# Patient Record
Sex: FEMALE | Race: BLACK OR AFRICAN AMERICAN | Employment: OTHER | URBAN - METROPOLITAN AREA
[De-identification: names, ages, dates, MRNs, and addresses within clinical notes are randomized per-mention and may not be internally consistent; named-entity substitution may affect disease eponyms.]

---

## 2017-06-20 ENCOUNTER — OFFICE VISIT (OUTPATIENT)
Dept: INTERNAL MEDICINE CLINIC | Age: 72
End: 2017-06-20

## 2017-06-20 VITALS
TEMPERATURE: 97.7 F | DIASTOLIC BLOOD PRESSURE: 69 MMHG | BODY MASS INDEX: 26.68 KG/M2 | HEIGHT: 66 IN | WEIGHT: 166 LBS | RESPIRATION RATE: 15 BRPM | SYSTOLIC BLOOD PRESSURE: 157 MMHG | HEART RATE: 64 BPM | OXYGEN SATURATION: 98 %

## 2017-06-20 DIAGNOSIS — E11.42 DIABETIC POLYNEUROPATHY ASSOCIATED WITH TYPE 2 DIABETES MELLITUS (HCC): ICD-10-CM

## 2017-06-20 DIAGNOSIS — E11.59 CONTROLLED TYPE 2 DIABETES MELLITUS WITH OTHER CIRCULATORY COMPLICATION: ICD-10-CM

## 2017-06-20 DIAGNOSIS — S78.112A UNILATERAL AKA, LEFT (HCC): ICD-10-CM

## 2017-06-20 DIAGNOSIS — I10 ESSENTIAL HYPERTENSION: Primary | ICD-10-CM

## 2017-06-20 DIAGNOSIS — G62.9 NEUROPATHY: ICD-10-CM

## 2017-06-20 PROBLEM — S78.119A UNILATERAL AKA (HCC): Status: ACTIVE | Noted: 2017-06-20

## 2017-06-20 RX ORDER — INSULIN GLARGINE 100 [IU]/ML
26 INJECTION, SOLUTION SUBCUTANEOUS DAILY
Qty: 5 PEN | Refills: 0 | Status: SHIPPED | OUTPATIENT
Start: 2017-06-20 | End: 2017-10-25 | Stop reason: SDUPTHER

## 2017-06-20 RX ORDER — HYDRALAZINE HYDROCHLORIDE 10 MG/1
10 TABLET, FILM COATED ORAL 3 TIMES DAILY
COMMUNITY
End: 2017-06-20 | Stop reason: SDUPTHER

## 2017-06-20 RX ORDER — INSULIN GLARGINE 100 [IU]/ML
26 INJECTION, SOLUTION SUBCUTANEOUS DAILY
Qty: 9 PEN | Refills: 3 | Status: SHIPPED | OUTPATIENT
Start: 2017-06-20 | End: 2017-06-20 | Stop reason: SDUPTHER

## 2017-06-20 RX ORDER — AMLODIPINE BESYLATE 5 MG/1
5 TABLET ORAL DAILY
COMMUNITY
End: 2017-06-20 | Stop reason: SDUPTHER

## 2017-06-20 RX ORDER — FUROSEMIDE 40 MG/1
40 TABLET ORAL DAILY
Qty: 90 TAB | Refills: 3 | Status: SHIPPED | OUTPATIENT
Start: 2017-06-20 | End: 2018-05-09 | Stop reason: SDUPTHER

## 2017-06-20 RX ORDER — HYDRALAZINE HYDROCHLORIDE 10 MG/1
10 TABLET, FILM COATED ORAL 3 TIMES DAILY
Qty: 270 TAB | Refills: 3 | Status: SHIPPED | OUTPATIENT
Start: 2017-06-20 | End: 2018-05-09 | Stop reason: SDUPTHER

## 2017-06-20 RX ORDER — INSULIN GLARGINE 100 [IU]/ML
26 INJECTION, SOLUTION SUBCUTANEOUS
COMMUNITY
End: 2017-06-20 | Stop reason: SDUPTHER

## 2017-06-20 RX ORDER — GABAPENTIN 100 MG/1
200 CAPSULE ORAL 2 TIMES DAILY
Qty: 360 CAP | Refills: 3 | Status: SHIPPED | OUTPATIENT
Start: 2017-06-20 | End: 2018-05-09 | Stop reason: SDUPTHER

## 2017-06-20 RX ORDER — METOPROLOL TARTRATE 50 MG/1
50 TABLET ORAL 2 TIMES DAILY
Qty: 180 TAB | Refills: 3 | Status: SHIPPED | OUTPATIENT
Start: 2017-06-20 | End: 2017-06-26

## 2017-06-20 RX ORDER — CLONIDINE HYDROCHLORIDE 0.2 MG/1
0.2 TABLET ORAL 2 TIMES DAILY
Qty: 180 TAB | Refills: 3 | Status: SHIPPED | OUTPATIENT
Start: 2017-06-20 | End: 2017-06-21 | Stop reason: DRUGHIGH

## 2017-06-20 RX ORDER — METOPROLOL TARTRATE 50 MG/1
TABLET ORAL 2 TIMES DAILY
COMMUNITY
End: 2017-06-20 | Stop reason: SDUPTHER

## 2017-06-20 RX ORDER — GABAPENTIN 100 MG/1
200 CAPSULE ORAL 2 TIMES DAILY
COMMUNITY
End: 2017-06-20 | Stop reason: SDUPTHER

## 2017-06-20 RX ORDER — AMLODIPINE BESYLATE 5 MG/1
5 TABLET ORAL DAILY
Qty: 90 TAB | Refills: 3 | Status: SHIPPED | OUTPATIENT
Start: 2017-06-20 | End: 2018-05-09 | Stop reason: SDUPTHER

## 2017-06-20 RX ORDER — FUROSEMIDE 40 MG/1
TABLET ORAL DAILY
COMMUNITY
End: 2017-06-20 | Stop reason: SDUPTHER

## 2017-06-20 RX ORDER — CLONIDINE HYDROCHLORIDE 0.1 MG/1
TABLET ORAL 2 TIMES DAILY
COMMUNITY
End: 2017-06-20 | Stop reason: SDUPTHER

## 2017-06-20 NOTE — PATIENT INSTRUCTIONS

## 2017-06-20 NOTE — PROGRESS NOTES
FLOWER Cortés is a 66 y.o. female, she presents today for:    66year old new patient. With hypertension and diabetes. Lives alone. Supported by son Kj Romeo who accompanied her to the visit. History of daibetic ulcer on left foot, including wound care. Developed into gangrene and amputated toes, has had 3 surgeries. 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 Dr. Morteza Smith February. Currently has temporary prosthesis. Continues to be in rehab. Had some treatment on kidney (Teresia Foxworth) while in the hosptal for surgery. Seen at [facility] for rehab and medications for HTN adjusted there (clonidine and amitrypitline)    No known history of heart attack or stroke. Diabetes: taking insulin in the morning 26 units. Lowest blood sugar 64. Highest in past 2 weeks 190 units. PMH/PSH: reviewed and updated  Sochx:  reports that she has never smoked. She does not have any smokeless tobacco history on file. She reports that she does not drink alcohol. Famhx: reviewed and updated     All: No Known Allergies  Med:   Current Outpatient Prescriptions   Medication Sig    amLODIPine (NORVASC) 5 mg tablet Take 5 mg by mouth daily.  gabapentin (NEURONTIN) 100 mg capsule Take 200 mg by mouth two (2) times a day.  hydrALAZINE (APRESOLINE) 10 mg tablet Take 10 mg by mouth three (3) times daily.  metoprolol tartrate (LOPRESSOR) 50 mg tablet Take  by mouth two (2) times a day.  furosemide (LASIX) 40 mg tablet Take  by mouth daily.  cloNIDine HCl (CATAPRES) 0.1 mg tablet Take  by mouth two (2) times a day.  insulin glargine (LANTUS SOLOSTAR) 100 unit/mL (3 mL) inpn 26 Units by SubCUTAneous route. No current facility-administered medications for this visit. Review of Systems   Constitutional: Negative for chills, fever and weight loss. HENT: Negative for congestion. Respiratory: Negative for cough, shortness of breath and wheezing. Cardiovascular: Negative for chest pain and leg swelling.    Gastrointestinal: Negative for abdominal pain, diarrhea, nausea and vomiting. Genitourinary: Negative for dysuria. Skin: Negative for rash. Neurological: Negative for dizziness and headaches. PE:  Blood pressure 157/69, pulse 64, temperature 97.7 °F (36.5 °C), temperature source Oral, resp. rate 15, height 5' 5.5\" (1.664 m), weight 166 lb (75.3 kg), SpO2 98 %. Body mass index is 27.2 kg/(m^2). Physical Exam   Constitutional: She is oriented to person, place, and time. She appears well-developed and well-nourished. No distress. HENT:   Head: Normocephalic. Mouth/Throat: Oropharynx is clear and moist.   Eyes: Conjunctivae and EOM are normal.   Neck: Neck supple. Cardiovascular: Normal rate, regular rhythm and normal heart sounds. Pulmonary/Chest: Effort normal and breath sounds normal.   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 Skin: Skin is warm and dry. Nursing note and vitals reviewed. Labs:   No results found for any visits on 06/20/17. A/P:  66 y.o. female    ICD-10-CM ICD-9-CM    1. Essential hypertension I10 401.9 amLODIPine (NORVASC) 5 mg tablet      hydrALAZINE (APRESOLINE) 10 mg tablet      metoprolol tartrate (LOPRESSOR) 50 mg tablet      furosemide (LASIX) 40 mg tablet      cloNIDine HCl (CATAPRES) 0.2 mg tablet      METABOLIC PANEL, COMPREHENSIVE   2. Controlled type 2 diabetes mellitus with other circulatory complication ([facility]) D08.22 250.70 insulin glargine (LANTUS SOLOSTAR) 100 unit/mL (3 mL) inpn      MICROALBUMIN, UR, RAND W/ MICROALBUMIN/CREA RATIO      HEMOGLOBIN A1C WITH EAG      CBC W/O DIFF      DISCONTINUED: insulin glargine (LANTUS SOLOSTAR) 100 unit/mL (3 mL) inpn   3. Neuropathy G62.9 355.9 gabapentin (NEURONTIN) 100 mg capsule   4. Diabetic polyneuropathy associated with type 2 diabetes mellitus ([facility]) P05.75 575.12 METABOLIC PANEL, COMPREHENSIVE     357.2    5.  Unilateral AKA, left [id number] V49.76      Uncontrolled HTN: continue current medications, pending lab results. - increase clonidine from 0.1 to 0.2 mg    Diabetes, on insulin, with neuropathy: reported as controlled per patient. contintue at 26 units of insulin daily. Not on metformin, suspect CKD (though patient denies this). - A1C and microalbumin requested. - advised patient okay to reduce gabapentin from 200 to 100 if she would like to see if pain is controlled off this medication. Unilateral AKA: healing well per patient. - She was given AVS and expressed understanding with the diagnosis and plan as discussed. Follow-up Disposition:  Return in about 1 month (around 7/20/2017) for medicare wellness and follow-up BP. No future appointments.

## 2017-06-20 NOTE — MR AVS SNAPSHOT
Visit Information Date & Time Provider Department Dept. Phone Encounter #  
 6/20/2017 10:00 AM Gabi Ramos MD [facility] [phone] Follow-up Instructions Return in about 1 month (around 7/20/2017) for medicare wellness and follow-up BP. Upcoming Health Maintenance Date Due DTaP/Tdap/Td series (1 - Tdap) 7/18/1964 FOBT Q 1 YEAR AGE 50-75 7/18/1993 ZOSTER VACCINE AGE 60> 7/18/2003 GLAUCOMA SCREENING Q2Y 7/18/2008 OSTEOPOROSIS SCREENING (DEXA) 7/18/2008 Pneumococcal 65+ Low/Medium Risk (1 of 2 - PCV13) 7/18/2008 MEDICARE YEARLY EXAM 7/18/2008 BREAST CANCER SCRN MAMMOGRAM 2/14/2014 INFLUENZA AGE 9 TO ADULT 8/1/2017 Allergies as of 6/20/2017  Review Complete On: 6/20/2017 By: Lannette Soulier No Known Allergies Current Immunizations  Never Reviewed No immunizations on file. Not reviewed this visit You Were Diagnosed With   
  
 Codes Comments Essential hypertension    -  Primary ICD-10-CM: I10 
ICD-9-CM: 401.9 Controlled type 2 diabetes mellitus with other circulatory complication (HCC)     CCB-96-AR: E11.59 
ICD-9-CM: 250.70 Neuropathy     ICD-10-CM: G62.9 ICD-9-CM: 355.9 Diabetic polyneuropathy associated with type 2 diabetes mellitus ([facility]     ICD-10-CM: E11.42 
ICD-9-CM: 250.60, 357.2 Unilateral AKA, left     ICD-10-CM: I72.887 ICD-9-CM: V49.76 Vitals BP Pulse Temp Resp Height(growth percentile) Weight(growth percentile) 157/69 64 97.7 °F (36.5 °C) (Oral) 15 5' 5.5\" (1.664 m) 166 lb (75.3 kg) LMP SpO2 BMI Smoking Status (LMP Unknown) 98% 27.2 kg/m2 Never Smoker Vitals History BMI and BSA Data Body Mass Index Body Surface Area  
 27.2 kg/m 2 1.87 m 2 Preferred Pharmacy Pharmacy Name Phone [address], [facility] [address] [phone] Your Updated Medication List  
  
   
This list is accurate as of: 6/20/17 11:17 AM.  Always use your most recent med list. amLODIPine 5 mg tablet Commonly known as:  Sameul Tru Take 1 Tab by mouth daily. cloNIDine HCl 0.2 mg tablet Commonly known as:  CATAPRES Take 1 Tab by mouth two (2) times a day. furosemide 40 mg tablet Commonly known as:  LASIX Take 1 Tab by mouth daily. gabapentin 100 mg capsule Commonly known as:  NEURONTIN Take 2 Caps by mouth two (2) times a day. hydrALAZINE 10 mg tablet Commonly known as:  APRESOLINE Take 1 Tab by mouth three (3) times daily. insulin glargine 100 unit/mL (3 mL) Inpn Commonly known as:  LANTUS SOLOSTAR  
26 Units by SubCUTAneous route daily. metoprolol tartrate 50 mg tablet Commonly known as:  LOPRESSOR Take 1 Tab by mouth two (2) times a day. Prescriptions Sent to Pharmacy Refills  
 amLODIPine (NORVASC) 5 mg tablet 3 Sig: Take 1 Tab by mouth daily. Class: Normal  
 Pharmacy: [address] [place] [address] Ph #: [phone] Route: Oral  
 hydrALAZINE (APRESOLINE) 10 mg tablet 3 Sig: Take 1 Tab by mouth three (3) times daily. Class: Normal  
 Pharmacy: [address] [place] [address] Ph #: [phone] Route: Oral  
 metoprolol tartrate (LOPRESSOR) 50 mg tablet 3 Sig: Take 1 Tab by mouth two (2) times a day. Class: Normal  
 Pharmacy: [address] [place] [address] Ph #: [phone] Route: Oral  
 furosemide (LASIX) 40 mg tablet 3 Sig: Take 1 Tab by mouth daily. Class: Normal  
 Pharmacy: [address] [place] [address] Ph #: [phone] Route: Oral  
 cloNIDine HCl (CATAPRES) 0.2 mg tablet 3 Sig: Take 1 Tab by mouth two (2) times a day.   
 Class: Normal  
 Pharmacy: [address] [place] [address], [address] [place] [address] Ph #: [phone] Route: Oral  
 gabapentin (NEURONTIN) 100 mg capsule 3 Sig: Take 2 Caps by mouth two (2) times a day. Class: Normal  
 Pharmacy: [address] [place] [address], [address] [place] [address] Ph #: [phone] Route: Oral  
 insulin glargine (LANTUS SOLOSTAR) 100 unit/mL (3 mL) inpn 0 Si Units by SubCUTAneous route daily. Class: Normal  
 Pharmacy: [facility] [address] [place] [address], [facility] [address] [place] [address] Ph #: [phone] Route: SubCUTAneous We Performed the Following CBC W/O DIFF [18410 CPT(R)] HEMOGLOBIN A1C WITH EAG [32335 CPT(R)] METABOLIC PANEL, COMPREHENSIVE [04336 CPT(R)] MICROALBUMIN, UR, RAND W/ MICROALBUMIN/CREA RATIO E325227 CPT(R)] Follow-up Instructions Return in about 1 month (around 2017) for medicare wellness and follow-up BP. Patient Instructions High Blood Pressure: Care Instructions Your Care Instructions If your blood pressure is usually above 140/90, you have high blood pressure, or hypertension. That means the top number is 140 or higher or the bottom number is 90 or higher, or both. Despite what a lot of people think, high blood pressure usually doesn't cause headaches or make you feel dizzy or lightheaded. It usually has no symptoms. But it does increase your risk for heart attack, stroke, and kidney or eye damage. The higher your blood pressure, the more your risk increases. Your doctor will give you a goal for your blood pressure. Your goal will be based on your health and your age. An example of a goal is to keep your blood pressure below 140/90. Lifestyle changes, such as eating healthy and being active, are always important to help lower blood pressure.  You might also take medicine to reach your blood pressure goal. 
 Follow-up care is a key part of your treatment and safety. Be sure to make and go to all appointments, and call your doctor if you are having problems. It's also a good idea to know your test results and keep a list of the medicines you take. How can you care for yourself at home? Medical treatment · If you stop taking your medicine, your blood pressure will go back up. You may take one or more types of medicine to lower your blood pressure. Be safe with medicines. Take your medicine exactly as prescribed. Call your doctor if you think you are having a problem with your medicine. · Talk to your doctor before you start taking aspirin every day. Aspirin can help certain people lower their risk of a heart attack or stroke. But taking aspirin isn't right for everyone, because it can cause serious bleeding. · See your doctor regularly. You may need to see the doctor more often at first or until your blood pressure comes down. · If you are taking blood pressure medicine, talk to your doctor before you take decongestants or anti-inflammatory medicine, such as ibuprofen. Some of these medicines can raise blood pressure. · Learn how to check your blood pressure at home. Lifestyle changes · Stay at a healthy weight. This is especially important if you put on weight around the waist. Losing even 10 pounds can help you lower your blood pressure. · If your doctor recommends it, get more exercise. Walking is a good choice. Bit by bit, increase the amount you walk every day. Try for at least 30 minutes on most days of the week. You also may want to swim, bike, or do other activities. · Avoid or limit alcohol. Talk to your doctor about whether you can drink any alcohol. · Try to limit how much sodium you eat to less than 2,300 milligrams (mg) a day. Your doctor may ask you to try to eat less than 1,500 mg a day.  
· Eat plenty of fruits (such as bananas and oranges), vegetables, legumes, whole grains, and low-fat dairy products. · Lower the amount of saturated fat in your diet. Saturated fat is found in animal products such as milk, cheese, and meat. Limiting these foods may help you lose weight and also lower your risk for heart disease. · Do not smoke. Smoking increases your risk for heart attack and stroke. If you need help quitting, talk to your doctor about stop-smoking programs and medicines. These can increase your chances of quitting for good. When should you call for help? Call 911 anytime you think you may need emergency care. This may mean having symptoms that suggest that your blood pressure is causing a serious heart or blood vessel problem. Your blood pressure may be over 180/110. For example, call 911 if: 
· You have symptoms of a heart attack. These may include: ¨ Chest pain or pressure, or a strange feeling in the chest. 
¨ Sweating. ¨ Shortness of breath. ¨ Nausea or vomiting. ¨ Pain, pressure, or a strange feeling in the back, neck, jaw, or upper belly or in one or both shoulders or arms. ¨ Lightheadedness or sudden weakness. ¨ A fast or irregular heartbeat. · You have symptoms of a stroke. These may include: 
¨ Sudden numbness, tingling, weakness, or loss of movement in your face, arm, or leg, especially on only one side of your body. ¨ Sudden vision changes. ¨ Sudden trouble speaking. ¨ Sudden confusion or trouble understanding simple statements. ¨ Sudden problems with walking or balance. ¨ A sudden, severe headache that is different from past headaches. · You have severe back or belly pain. Do not wait until your blood pressure comes down on its own. Get help right away. Call your doctor now or seek immediate care if: 
· Your blood pressure is much higher than normal (such as 180/110 or higher), but you don't have symptoms. · You think high blood pressure is causing symptoms, such as: ¨ Severe headache. ¨ Blurry vision. Watch closely for changes in your health, and be sure to contact your doctor if: 
· Your blood pressure measures 140/90 or higher at least 2 times. That means the top number is 140 or higher or the bottom number is 90 or higher, or both. · You think you may be having side effects from your blood pressure medicine. · Your blood pressure is usually normal, but it goes above normal at least 2 times. Where can you learn more? Go to http://justice-eladio.info/. Enter E254 in the search box to learn more about \"High Blood Pressure: Care Instructions. \" Current as of: August 8, 2016 Content Version: 11.3 © 5309-6381 [employer]. Care instructions adapted under license by [employer] (which disclaims liability or warranty for this information). If you have questions about a medical condition or this instruction, always ask your healthcare professional. [place] [address] any warranty or liability for your use of this information. Introducing [address] Lito Amaro introduces Blue Calypso patient portal. Now you can access parts of your medical record, email your doctor's office, and request medication refills online. 1. In your internet browser, go to [email or web address] 2. Click on the First Time User? Click Here link in the Sign In box. You will see the New Member Sign Up page. 3. Enter your Blue Calypso Access Code exactly as it appears below. You will not need to use this code after youve completed the sign-up process. If you do not sign up before the expiration date, you must request a new code. · Blue Calypso Access Code: [email or web address] Expires: 9/18/2017  9:51 AM 
 
4. Enter the last four digits of your Social Security Number (xxxx) and Date of Birth (mm/dd/yyyy) as indicated and click Submit. You will be taken to the next sign-up page. 5. Create a Blue Calypso ID.  This will be your Blue Calypso login ID and cannot be changed, so think of one that is secure and easy to remember. 6. Create a aScentias password. You can change your password at any time. 7. Enter your Password Reset Question and Answer. This can be used at a later time if you forget your password. 8. Enter your e-mail address. You will receive e-mail notification when new information is available in [address] 9. Click Sign Up. You can now view and download portions of your medical record. 10. Click the Download Summary menu link to download a portable copy of your medical information. If you have questions, please visit the Frequently Asked Questions section of the aScentias website. Remember, aScentias is NOT to be used for urgent needs. For medical emergencies, dial 911. Now available from your iPhone and Android! Please provide this summary of care documentation to your next provider. Your primary care clinician is listed as Ronda Barajas. If you have any questions after today's visit, please call [phone].

## 2017-06-21 ENCOUNTER — TELEPHONE (OUTPATIENT)
Dept: INTERNAL MEDICINE CLINIC | Age: 72
End: 2017-06-21

## 2017-06-21 DIAGNOSIS — I10 ESSENTIAL HYPERTENSION: ICD-10-CM

## 2017-06-21 DIAGNOSIS — N18.3 CKD (CHRONIC KIDNEY DISEASE), STAGE 3 (MODERATE): ICD-10-CM

## 2017-06-21 DIAGNOSIS — E11.21 DIABETIC NEPHROPATHY ASSOCIATED WITH TYPE 2 DIABETES MELLITUS (HCC): Primary | ICD-10-CM

## 2017-06-21 PROBLEM — N18.9 CKD (CHRONIC KIDNEY DISEASE): Status: ACTIVE | Noted: 2017-06-21

## 2017-06-21 LAB
ALBUMIN SERPL-MCNC: 3.5 G/DL (ref 3.5–4.8)
ALBUMIN/CREAT UR: 1258.9 MG/G CREAT (ref 0–30)
ALBUMIN/GLOB SERPL: 1 {RATIO} (ref 1.2–2.2)
ALP SERPL-CCNC: 89 IU/L (ref 39–117)
ALT SERPL-CCNC: 7 IU/L (ref 0–32)
AST SERPL-CCNC: 11 IU/L (ref 0–40)
BILIRUB SERPL-MCNC: 0.3 MG/DL (ref 0–1.2)
BUN SERPL-MCNC: 37 MG/DL (ref 8–27)
BUN/CREAT SERPL: 21 (ref 12–28)
CALCIUM SERPL-MCNC: 9 MG/DL (ref 8.7–10.3)
CHLORIDE SERPL-SCNC: 104 MMOL/L (ref 96–106)
CO2 SERPL-SCNC: 21 MMOL/L (ref 18–29)
CREAT SERPL-MCNC: 1.76 MG/DL (ref 0.57–1)
CREAT UR-MCNC: 17.5 MG/DL
ERYTHROCYTE [DISTWIDTH] IN BLOOD BY AUTOMATED COUNT: 14.4 % (ref 12.3–15.4)
EST. AVERAGE GLUCOSE BLD GHB EST-MCNC: 174 MG/DL
GLOBULIN SER CALC-MCNC: 3.4 G/DL (ref 1.5–4.5)
GLUCOSE SERPL-MCNC: 91 MG/DL (ref 65–99)
HBA1C MFR BLD: 7.7 % (ref 4.8–5.6)
HCT VFR BLD AUTO: 36.8 % (ref 34–46.6)
HGB BLD-MCNC: 11.7 G/DL (ref 11.1–15.9)
MCH RBC QN AUTO: 25.4 PG (ref 26.6–33)
MCHC RBC AUTO-ENTMCNC: 31.8 G/DL (ref 31.5–35.7)
MCV RBC AUTO: 80 FL (ref 79–97)
MICROALBUMIN UR-MCNC: 220.3 UG/ML
PLATELET # BLD AUTO: 259 X10E3/UL (ref 150–379)
POTASSIUM SERPL-SCNC: 4.1 MMOL/L (ref 3.5–5.2)
PROT SERPL-MCNC: 6.9 G/DL (ref 6–8.5)
RBC # BLD AUTO: 4.61 X10E6/UL (ref 3.77–5.28)
SODIUM SERPL-SCNC: 144 MMOL/L (ref 134–144)
WBC # BLD AUTO: 12 X10E3/UL (ref 3.4–10.8)

## 2017-06-21 RX ORDER — LISINOPRIL 10 MG/1
10 TABLET ORAL DAILY
Qty: 30 TAB | Refills: 1 | Status: SHIPPED | OUTPATIENT
Start: 2017-06-21 | End: 2017-07-18 | Stop reason: SDUPTHER

## 2017-06-21 RX ORDER — CLONIDINE HYDROCHLORIDE 0.1 MG/1
0.1 TABLET ORAL 2 TIMES DAILY
Qty: 180 TAB | Refills: 1 | Status: SHIPPED | OUTPATIENT
Start: 2017-06-21 | End: 2018-03-04 | Stop reason: SDUPTHER

## 2017-06-21 NOTE — PROGRESS NOTES
Elevated microalbumin, and creatinine. Consistent with CKd stage 3b (egf ~33) with proteinuria. High risk of ckd progression. Why is she not on ace inhibitor. ..  2009 renal ultrasound shows mild atrophy bilaterally. See phone note, plan to start lisinopril 10 unless patient remembers stopping a medicine for kidneys or potassium.

## 2017-06-21 NOTE — TELEPHONE ENCOUNTER
Called patient and discussed results. Last A1C was 11 so she is very pleased with a 7.7    Notes that she has had diabetes since age 27. And long standing hypertension. No recollection on having to stop medication for elevated potassium or other concern. Agrees to trial low dose ACE (lisinopirl 10mg). WIll follow-up potassium and creatinine at next visit in ~ 1 month. Called and cancelled 0.2 mg clonidine.     Stacie Hargrove MD

## 2017-06-26 ENCOUNTER — TELEPHONE (OUTPATIENT)
Dept: INTERNAL MEDICINE CLINIC | Age: 72
End: 2017-06-26

## 2017-06-26 DIAGNOSIS — I10 ESSENTIAL HYPERTENSION: Primary | ICD-10-CM

## 2017-06-26 DIAGNOSIS — I10 ESSENTIAL HYPERTENSION: ICD-10-CM

## 2017-06-26 RX ORDER — METOPROLOL SUCCINATE 50 MG/1
50 TABLET, EXTENDED RELEASE ORAL DAILY
Qty: 90 TAB | Refills: 3 | Status: SHIPPED | OUTPATIENT
Start: 2017-06-26 | End: 2017-07-03 | Stop reason: SDUPTHER

## 2017-06-26 NOTE — TELEPHONE ENCOUNTER
Please confirm with patient that she takes metoprolol succinate (50mg) 1 time daily. (at first visit we ordered metoprolol tartrate 2 times daily.  I am changing it to succinate as [facility] stated this what dr Thalia Mace had ordered)    Thanks  Elif Ventura MD

## 2017-06-27 ENCOUNTER — TELEPHONE (OUTPATIENT)
Dept: INTERNAL MEDICINE CLINIC | Age: 72
End: 2017-06-27

## 2017-06-27 DIAGNOSIS — S78.112A UNILATERAL AKA, LEFT (HCC): Primary | ICD-10-CM

## 2017-06-27 DIAGNOSIS — I73.9 PVD (PERIPHERAL VASCULAR DISEASE) (HCC): ICD-10-CM

## 2017-06-27 NOTE — TELEPHONE ENCOUNTER
1. Referral entered. Please process as requested. (see portion of encounter regarding Dr. Ofelia Mosquera office. 2. Please advise Physical therapy that I will be titrating medications with Myra Love on 7/25. However I would recommend that blood pressure be less than 190/100. If BP persists in elevating during therapy, please send record of pressures, and I would likely increase amlodipine from 5 to 10mg.      Thanks  Charlie Patel MD

## 2017-06-27 NOTE — TELEPHONE ENCOUNTER
Katalina Lema from [facility] at [facility] called requesting a referral the pt was seen on 6/23/17 for her Surgical Care for her Peripheral Vascular Disease. Any question's Alexandra's # [phone] there fax # [phone].

## 2017-06-27 NOTE — TELEPHONE ENCOUNTER
Anurag Check from [facility] is returning the nurse's call Anurag Check is currently with a pt and will be available after 3:30 pm.Erika's # [phone].

## 2017-06-27 NOTE — TELEPHONE ENCOUNTER
Jesusita Salmeron returned call went over the recommendations from Dr Pipo Mandel for her B/P during her therapy sessions, Jesusita Salmeron stated she will fax over B/p readings today. Patient will be in 7/25/2017 in office with Dr Pipo Mandel    Please advise any new recommendations or if you need patient in sooner.

## 2017-06-27 NOTE — TELEPHONE ENCOUNTER
Referral faxed to Dr. Alicia Singer office. [facility] requesting return call from [facility] regarding therapy/BP.

## 2017-06-27 NOTE — TELEPHONE ENCOUNTER
----- Message from Carolyn Keith sent at 6/27/2017 10:35 AM EDT -----  Regarding: Dr. Dian Guerra is returning a call received this A.M. Pt best contact [phone].

## 2017-06-27 NOTE — TELEPHONE ENCOUNTER
Nelly Jones a P/T with [facility] saw the pt today for pt and was concerned with the pt's BP. When pt showed up her BP was 155/64 heart rate of 54 after walking 150 ft with walker BP was checked again it was 193/74 heart rate 72. Per Nelly Jones pt did fall during therapy today no injury BP was stable and was asystemic no sign's of stress. Nelly Jones would like to discuss BP Perimeters when it it come's to doing P/T with the pt Erika[facility] # [phone].

## 2017-07-03 DIAGNOSIS — I10 ESSENTIAL HYPERTENSION: ICD-10-CM

## 2017-07-03 NOTE — TELEPHONE ENCOUNTER
Call placed to pt to clarify if metoprolol should be going to local pharmacy or [facility], as last (current) Rx sent to local pharmacy and [facility] if faxing request for order clarification.      LVM requesting callback to office,

## 2017-07-05 RX ORDER — METOPROLOL SUCCINATE 50 MG/1
50 TABLET, EXTENDED RELEASE ORAL DAILY
Qty: 90 TAB | Refills: 3 | Status: SHIPPED | OUTPATIENT
Start: 2017-07-05 | End: 2018-09-04 | Stop reason: SDUPTHER

## 2017-07-18 DIAGNOSIS — I10 ESSENTIAL HYPERTENSION: ICD-10-CM

## 2017-07-19 RX ORDER — LISINOPRIL 10 MG/1
TABLET ORAL
Qty: 90 TAB | Refills: 1 | Status: SHIPPED | OUTPATIENT
Start: 2017-07-19 | End: 2017-07-25 | Stop reason: SDUPTHER

## 2017-07-25 ENCOUNTER — OFFICE VISIT (OUTPATIENT)
Dept: INTERNAL MEDICINE CLINIC | Age: 72
End: 2017-07-25

## 2017-07-25 VITALS
RESPIRATION RATE: 18 BRPM | HEART RATE: 64 BPM | OXYGEN SATURATION: 97 % | SYSTOLIC BLOOD PRESSURE: 179 MMHG | TEMPERATURE: 97.6 F | DIASTOLIC BLOOD PRESSURE: 68 MMHG

## 2017-07-25 DIAGNOSIS — E11.59 CONTROLLED TYPE 2 DIABETES MELLITUS WITH OTHER CIRCULATORY COMPLICATION: ICD-10-CM

## 2017-07-25 DIAGNOSIS — Z23 ENCOUNTER FOR IMMUNIZATION: ICD-10-CM

## 2017-07-25 DIAGNOSIS — Z78.0 POSTMENOPAUSAL: ICD-10-CM

## 2017-07-25 DIAGNOSIS — I10 ESSENTIAL HYPERTENSION: ICD-10-CM

## 2017-07-25 DIAGNOSIS — Z13.6 SCREENING FOR ISCHEMIC HEART DISEASE: ICD-10-CM

## 2017-07-25 DIAGNOSIS — Z00.00 ROUTINE GENERAL MEDICAL EXAMINATION AT A HEALTH CARE FACILITY: Primary | ICD-10-CM

## 2017-07-25 DIAGNOSIS — Z12.11 SCREEN FOR COLON CANCER: ICD-10-CM

## 2017-07-25 DIAGNOSIS — Z12.31 ENCOUNTER FOR SCREENING MAMMOGRAM FOR MALIGNANT NEOPLASM OF BREAST: ICD-10-CM

## 2017-07-25 DIAGNOSIS — Z13.39 SCREENING FOR ALCOHOLISM: ICD-10-CM

## 2017-07-25 RX ORDER — LISINOPRIL 20 MG/1
20 TABLET ORAL DAILY
Qty: 90 TAB | Refills: 1 | Status: SHIPPED | OUTPATIENT
Start: 2017-07-25 | End: 2017-08-15

## 2017-07-25 RX ORDER — CALCIUM CITRATE/VITAMIN D3 200MG-6.25
TABLET ORAL
COMMUNITY
Start: 2017-07-04 | End: 2018-10-09 | Stop reason: SDUPTHER

## 2017-07-25 NOTE — PROGRESS NOTES
RM#3  Chief Complaint   Patient presents with   [address] [place] [address] Annual Wellness Visit     1. Have you been to the ER, urgent care clinic since your last visit? Hospitalized since your last visit? No    2. Have you seen or consulted any other health care providers outside of the [address] [place] [address] since your last visit? Include any pap smears or colon screening. No  Health Maintenance Due   Topic Date Due    LIPID PANEL Q1  1945    FOOT EXAM Q1  07/18/1953    EYE EXAM RETINAL OR DILATED Q1  07/18/1953    DTaP/Tdap/Td series (1 - Tdap) 07/18/1964    FOBT Q 1 YEAR AGE 50-75  07/18/1993    ZOSTER VACCINE AGE 60>  05/18/2003    GLAUCOMA SCREENING Q2Y  07/18/2008    OSTEOPOROSIS SCREENING (DEXA)  07/18/2008    Pneumococcal 65+ Low/Medium Risk (1 of 2 - PCV13) 07/18/2008    MEDICARE YEARLY EXAM  07/18/2008    BREAST CANCER SCRN MAMMOGRAM  02/14/2014   foot exam-doesn't go to foot exam   Pt states due for MAMMO   Pt states due for eye exam dilated and glacoma   Pt states never had DEXA   PT states received PCV 13 AT [facility] faxed release   PT states never had zoster vaccine  hasnt had lipid   Hm reviewed     PHQ over the last two weeks 7/25/2017   Little interest or pleasure in doing things Not at all   Feeling down, depressed or hopeless Not at all   Total Score PHQ 2 0     Fall Risk Assessment, last 12 mths 7/25/2017   Able to walk? Yes   Fall in past 12 months? Yes   Fall with injury?  No   Number of falls in past 12 months 1   Fall Risk Score 1     ADL Assessment 7/25/2017   Feeding yourself No Help Needed   Getting from bed to chair No Help Needed   Getting dressed No Help Needed   Bathing or showering No Help Needed   Walk across the room (includes cane/walker) No Help Needed   Using the telphone No Help Needed   Taking your medications No Help Needed   Preparing meals No Help Needed   Managing money (expenses/bills) No Help Needed   Moderately strenuous housework (laundry) No Help Needed Shopping for personal items (toiletries/medicines) Help Needed   Shopping for groceries Help Needed   Driving Help Needed   Climbing a flight of stairs Help Needed   Getting to places beyond walking distances Help Needed     Abuse Screening Questionnaire 7/25/2017   Do you ever feel afraid of your partner? N   Are you in a relationship with someone who physically or mentally threatens you? N   Is it safe for you to go home?  Sabiha Kuhn

## 2017-07-25 NOTE — PROGRESS NOTES
This is an Initial Medicare Annual Wellness Exam (AWV) (Performed 12 months after IPPE or effective date of Medicare Part B enrollment, Once in a lifetime)    I have reviewed the patient's medical history in detail and updated the computerized patient record. History     Lowest blood pressure at therapy was 690 systolic. Patient states she would like to have colonoscopy. Had 1 previously, no findings. Blood sugars at home are doing well. Notes that cakes and pies raise the blood sugar the most. (fig newtons). Past Medical History:   Diagnosis Date    Diabetes ([address]     Hypertension       Past Surgical History:   Procedure Laterality Date    HX ABOVE KNEE AMPUTATION  02/06/2017    HX BELOW KNEE AMPUTATION      HX GYN      hysterectomy     Current Outpatient Prescriptions   Medication Sig Dispense Refill    TRUE METRIX GLUCOSE TEST STRIP strip       lisinopril (PRINIVIL, ZESTRIL) 10 mg tablet TAKE 1 TABLET BY MOUTH DAILY 90 Tab 1    metoprolol succinate (TOPROL-XL) 50 mg XL tablet Take 1 Tab by mouth daily. 90 Tab 3    cloNIDine HCl (CATAPRES) 0.1 mg tablet Take 1 Tab by mouth two (2) times a day. 180 Tab 1    amLODIPine (NORVASC) 5 mg tablet Take 1 Tab by mouth daily. 90 Tab 3    hydrALAZINE (APRESOLINE) 10 mg tablet Take 1 Tab by mouth three (3) times daily. 270 Tab 3    furosemide (LASIX) 40 mg tablet Take 1 Tab by mouth daily. 90 Tab 3    gabapentin (NEURONTIN) 100 mg capsule Take 2 Caps by mouth two (2) times a day. 360 Cap 3    insulin glargine (LANTUS SOLOSTAR) 100 unit/mL (3 mL) inpn 26 Units by SubCUTAneous route daily.  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 ([facility] E11.59    Diabetic polyneuropathy associated with type 2 diabetes mellitus ([facility] E11.42    Unilateral AKA ([facility] Z89.619    CKD (chronic kidney disease) N18.9    Diabetic nephropathy associated with type 2 diabetes mellitus ([facility] E11.21     Depression Risk Factor Screening:     PHQ over the last two weeks 7/25/2017   Little interest or pleasure in doing things Not at all   Feeling down, depressed or hopeless Not at all   Total Score PHQ 2 0     Alcohol Risk Factor Screening: On any occasion during the past 3 months, have you had more than 3 drinks containing alcohol? No    Do you average more than 7 drinks per week? No      Functional Ability and Level of Safety:     Hearing Loss   normal-to-mild    Activities of Daily Living   Self-care. Requires assistance with: no ADLs    Supported by son. He runs the [employer]. Is not longer driving. Notes that sons are handling. Fall Risk   Fall Risk Assessment, last 12 mths 7/25/2017   Able to walk? Yes   Fall in past 12 months? Yes   Fall with injury? No   Number of falls in past 12 months 1   Fall Risk Score 1     Abuse Screen   Patient is not abused    Review of Systems   A comprehensive review of systems was negative except for that written in the HPI. Physical Examination     Evaluation of Cognitive Function:  Mood/affect:  neutral  Appearance: age appropriate  Family member/caregiver input: none present    Visit Vitals    /68 (BP 1 Location: Right arm, BP Patient Position: Sitting)    Pulse 64    Temp 97.6 °F (36.4 °C) (Oral)    Resp 18    SpO2 97%   Physical Exam   Constitutional: She is oriented to person, place, and time. She appears well-developed and well-nourished. No distress. HENT:   Head: Normocephalic.    Mouth/Throat: Oropharynx is clear and moist.   Eyes: Conjunctivae and EOM are normal.   Neck: Neck supple. Cardiovascular: Normal rate, regular rhythm and normal heart sounds. Pulmonary/Chest: Effort normal and breath sounds normal.   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 Skin: Skin is warm and dry. Nursing note and vitals reviewed. Diabetic foot exam:     Right: Filament test 6/6   Pulse DP: 1+ (weak)   Pulse PT: 1+ (weak)   Deformities: Mild - toenails. To follow-up with podiatrist      Patient Care Team:  Angela Callejas MD as PCP - General (Internal Medicine)  Bernard Cardenas MD (Surgery)    Advice/Referrals/Counseling   Education and counseling provided:  Are appropriate based on today's review and evaluation  End-of-Life planning (with patient's consent)  Pneumococcal Vaccine  Screening Mammography  Screening Pap and pelvic (covered once every 2 years)  Colorectal cancer screening tests  Bone mass measurement (DEXA)      Assessment/Plan       ICD-10-CM ICD-9-CM    1. Routine general medical examination at a health care facility Z00.00 V70.0    2. Screening for alcoholism Z13.89 V79.1    3. Encounter for screening mammogram for malignant neoplasm of breast Z12.31 V76.12 [facility] MAMMO BI SCREENING INCL CAD      CANCELED: [facility] MAMMO BI SCREENING INCL CAD   4. Postmenopausal Z78.0 V49.81 DEXA BONE DENSITY STUDY AXIAL   5. Encounter for immunization Z23 V03.89 ADMIN PNEUMOCOCCAL VACCINE      PNEUMOCOCCAL CONJ VACCINE 13 VALENT IM   6. Screen for colon cancer Z12.11 V76.51 REFERRAL TO GASTROENTEROLOGY   7. Screening for ischemic heart disease Z13.6 V81.0 LIPID PANEL   8. Essential hypertension I10 401.9 lisinopril (PRINIVIL, ZESTRIL) 20 mg tablet      METABOLIC PANEL, BASIC   9.  Controlled type 2 diabetes mellitus with other circulatory complication (HCC) A51.50 250.70 REFERRAL TO PODIATRY       DIABETES FOOT EXAM   .    Well woman (non-gyn) exam: history and exam revealed issues as noted below. Cancer screening:   - breast cancer: mammo ordered. - colon cancer: to get colonoscopy. - hysterectomy no need for cervical cancer screening. Vaccine status: Had flu shot at [facility]. Had pneumonia shot in the hospital.   Cardiovascular risk: BP well controlled, lipid screen . Bone health: daily vit D supplement. Diet and Exercise: not drinking sugary beverages. Uncontrolled HTN: continue current medications, pending lab results. - increase clonidine from 0.1 to 0.2 mg   - increase lisinopril from 10 to 20.mg   - follow-up bmp requested.      Diabetes, on insulin, with neuropathy: reported as controlled per patient. contintue at 26 units of insulin daily. Not on metformin, suspect CKD (though patient denies this). - follow-up with podiatry   - increase lisionpril      Unilateral AKA: healing well per patient. Follow-up Disposition:  Return in about 3 months (around 10/25/2017) for follow-up diabetes and HTN (schedule fasting labs at earliest convience).

## 2017-07-25 NOTE — PATIENT INSTRUCTIONS
Medicare Wellness Visit, Female    The best way to live healthy is to have a healthy lifestyle by eating a well-balanced diet, exercising regularly, limiting alcohol and stopping smoking. Regular physical exams and screening tests are another way to keep healthy. Preventive exams provided by your health care provider can find health problems before they become diseases or illnesses. Preventive services including immunizations, screening tests, monitoring and exams can help you take care of your own health. All people over age 72 should have a pneumovax  and and a prevnar shot to prevent pneumonia. These are once in a lifetime unless you and your provider decide differently. All people over 65 should have a yearly flu shot and a tetanus vaccine every 10 years. A bone mass density to screen for osteoporosis or thinning of the bones should be done every 2 years after 65. Screening for diabetes mellitus with a blood sugar test should be done every year. Glaucoma is a disease of the eye due to increased ocular pressure that can lead to blindness and it should be done every year by an eye professional.    Cardiovascular screening tests that check for elevated lipids (fatty part of blood) which can lead to heart disease and strokes should be done every 5 years. Colorectal screening that evaluates for blood or polyps in your colon should be done yearly as a stool test or every five years as a flexible sigmoidoscope or every 10 years as a colonoscopy up to age 76. Breast cancer screening with a mammogram is recommended biennially  for women age 54-69. Screening for cervical cancer with a pap smear and pelvic exam is recommended for women after age 72 years every 2 years up to age 79 or when the provider and patient decide to stop. If there is a history of cervical abnormalities or other increased risk for cancer then the test is recommended yearly.     Hepatitis C screening is also recommended for anyone born between [birth year] through [address]. A shingles vaccine is also recommended once in a lifetime after age 61. Your Medicare Wellness Exam is recommended annually. Here is a list of your current Health Maintenance items with a due date:  Health Maintenance Due   Topic Date Due    Cholesterol Test   1945    Diabetic Foot Care  07[birth year]18/1953    Eye Exam  07/18/1955    DTaP/Tdap/Td  (1 - Tdap) 07/18/1964    Stool testing for trace blood  07/18/1993    Shingles Vaccine  05/18/2003    Glaucoma Screening   07/18/2008    Bone Density Screening  07/18/2008    Pneumococcal Vaccine (1 of 2 - PCV13) 07/18/2008    Annual Well Visit  07/18/2008    Breast Cancer Screening  02/14/2014     - Increase lisinopril from 10 to 20 mg. Can take 2 of current 10 mg tablet until new medicine available    - Please consider getting shingles vaccine at pharmacy. - Please call and schedule colonoscopy and appointment with podiatrist   - To discuss living will and your preferences further, please call and schedule appointment with Ashok Dwyer. Learning About Living [place]  What is a living will? A living will is a legal form you use to write down the kind of care you want at the end of your life. It is used by the health professionals who will treat you if you aren't able to decide for yourself. If you put your wishes in writing, your loved ones and others will know what kind of care you want. They won't need to guess. This can ease your mind and be helpful to others. A living will is not the same as an estate or property will. An estate will explains what you want to happen with your money and property after you die. Is a living will a legal document? A living will is a legal document. Each state has its own laws about living bl[place]. If you move to another state, make sure that your living will is legal in the state where you now live. Or you might use a universal form that has been approved by many states. This kind of form can sometimes be completed and stored online. Your electronic copy will then be available wherever you have a connection to the Internet. In most cases, doctors will respect your wishes even if you have a form from a different state. · You don't need an  to complete a living will. But legal advice can be helpful if your state's laws are unclear, your health history is complicated, or your family can't agree on what should be in your living will. · You can change your living will at any time. Some people find that their wishes about end-of-life care change as their health changes. · In addition to making a living will, think about completing a medical power of  form. This form lets you name the person you want to make end-of-life treatment decisions for you (your \"health care agent\") if you're not able to. Many hospitals and nursing homes will give you the forms you need to complete a living will and a medical power of . · Your living will is used only if you can't make or communicate decisions for yourself anymore. If you become able to make decisions again, you can accept or refuse any treatment, no matter what you wrote in your living will. · Your state may offer an online registry. This is a place where you can store your living will online so the doctors and nurses who need to treat you can find it right away. What should you think about when creating a living will? Talk about your end-of-life wishes with your family members and your doctor. Let them know what you want. That way the people making decisions for you won't be surprised by your choices. Think about these questions as you make your living will:  · Do you know enough about life support methods that might be used? If not, talk to your doctor so you know what might be done if you can't breathe on your own, your heart stops, or you're unable to swallow.   · What things would you still want to be able to do after you receive life-support methods? Would you want to be able to walk? To speak? To eat on your own? To live without the help of machines? · If you have a choice, where do you want to be cared for? In your home? At a hospital or nursing home? · Do you want certain Zoroastrianism practices performed if you become very ill? · If you have a choice at the end of your life, where would you prefer to die? At home? In a hospital or nursing home? Somewhere else? · Would you prefer to be buried or cremated? · Do you want your organs to be donated after you die? What should you do with your living will? · Make sure that your family members and your health care agent have copies of your living will. · Give your doctor a copy of your living will to keep in your medical record. If you have more than one doctor, make sure that each one has a copy. · You may want to put a copy of your living will where it can be easily found. Where can you learn more? Go to http://justice-ealdio.info/. Enter F356 in the search box to learn more about \"Learning About Living [place]. \"  Current as of: August 8, 2016  Content Version: 11.3  © 2935-5929 [employer]. Care instructions adapted under license by [employer] (which disclaims liability or warranty for this information). If you have questions about a medical condition or this instruction, always ask your healthcare professional. [place] [address] any warranty or liability for your use of this information.

## 2017-07-25 NOTE — MR AVS SNAPSHOT
Visit Information Date & Time Provider Department Dept. Phone Encounter #  
 7/25/2017  9:30 AM Charlie Patel MD [address] \[facility]\"" Internal Medicine [phone] [id number] Follow-up Instructions Return in about 3 months (around 10/25/2017) for follow-up diabetes and HTN (schedule fasting labs at earliest convience). Upcoming Health Maintenance Date Due  
 LIPID PANEL Q1 1945 FOOT EXAM Q1 7/18/1953 EYE EXAM RETINAL OR DILATED Q1 7/18/1953 DTaP/Tdap/Td series (1 - Tdap) 7/18/1964 FOBT Q 1 YEAR AGE 50-75 7/18/1993 ZOSTER VACCINE AGE 60> 5/18/2003 GLAUCOMA SCREENING Q2Y 7/18/2008 OSTEOPOROSIS SCREENING (DEXA) 7/18/2008 Pneumococcal 65+ Low/Medium Risk (1 of 2 - PCV13) 7/18/2008 MEDICARE YEARLY EXAM 7/18/2008 BREAST CANCER SCRN MAMMOGRAM 2/14/2014 INFLUENZA AGE 9 TO ADULT 8/1/2017 HEMOGLOBIN A1C Q6M 12/20/2017 MICROALBUMIN Q1 6/20/2018 Allergies as of 7/25/2017  Review Complete On: 7/25/2017 By: Neri Olmstead LPN No Known Allergies Current Immunizations  Never Reviewed Name Date Pneumococcal Conjugate (PCV-13)  Incomplete Not reviewed this visit You Were Diagnosed With   
  
 Codes Comments Routine general medical examination at a health care facility    -  Primary ICD-10-CM: Z00.00 ICD-9-CM: V70.0 Screening for alcoholism     ICD-10-CM: Z13.89 ICD-9-CM: V79.1 Encounter for screening mammogram for malignant neoplasm of breast     ICD-10-CM: Z12.31 
ICD-9-CM: V76.12 Postmenopausal     ICD-10-CM: Z78.0 ICD-9-CM: V49.81 Encounter for immunization     ICD-10-CM: F28 ICD-9-CM: V03.89 Screen for colon cancer     ICD-10-CM: Z12.11 ICD-9-CM: V76.51 Screening for ischemic heart disease     ICD-10-CM: Z13.6 ICD-9-CM: V81.0 Essential hypertension     ICD-10-CM: I10 
ICD-9-CM: 401.9  Controlled type 2 diabetes mellitus with other circulatory complication [facility])     ICD-10-CM: E11.59 
ICD-9-CM: 250.70 Vitals BP Pulse Temp Resp LMP SpO2  
 179/68 (BP 1 Location: Right arm, BP Patient Position: Sitting) 64 97.6 °F (36.4 °C) (Oral) 18 (LMP Unknown) 97% OB Status Smoking Status Postmenopausal Former Smoker Preferred Pharmacy Pharmacy Name Phone [address], [facility] [address] [phone] Your Updated Medication List  
  
   
This list is accurate as of: 7/25/17 10:25 AM.  Always use your most recent med list. amLODIPine 5 mg tablet Commonly known as:  Beatrice You Take 1 Tab by mouth daily. cloNIDine HCl 0.1 mg tablet Commonly known as:  CATAPRES Take 1 Tab by mouth two (2) times a day. furosemide 40 mg tablet Commonly known as:  LASIX Take 1 Tab by mouth daily. gabapentin 100 mg capsule Commonly known as:  NEURONTIN Take 2 Caps by mouth two (2) times a day. hydrALAZINE 10 mg tablet Commonly known as:  APRESOLINE Take 1 Tab by mouth three (3) times daily. insulin glargine 100 unit/mL (3 mL) Inpn Commonly known as:  LANTUS SOLOSTAR  
26 Units by SubCUTAneous route daily. lisinopril 20 mg tablet Commonly known as:  [place]eg [place] Take 1 Tab by mouth daily. metoprolol succinate 50 mg XL tablet Commonly known as:  TOPROL-XL Take 1 Tab by mouth daily. TRUE METRIX GLUCOSE TEST STRIP strip Generic drug:  glucose blood VI test strips Prescriptions Sent to Pharmacy Refills  
 lisinopril (PRINIVIL, ZESTRIL) 20 mg tablet 1 Sig: Take 1 Tab by mouth daily. Class: Normal  
 Pharmacy: [facility] [address] [place] [address] [address] Ph #: [phone] Route: Oral  
  
We Performed the Following ADMIN PNEUMOCOCCAL VACCINE [ [facility]] HM DIABETES FOOT EXAM [HM7 Custom] PNEUMOCOCCAL CONJ VACCINE 13 VALENT IM [id number] CPT(R)] REFERRAL TO GASTROENTEROLOGY [OPL72 Custom] Comments:  
 Please evaluate patient for screening colonoscopy. REFERRAL TO PODIATRY [REF90 Custom] Comments:  
 Please evaluate patient for nail trimming and foot care Follow-up Instructions Return in about 3 months (around 10/25/2017) for follow-up diabetes and HTN (schedule fasting labs at earliest convience). To-Do List   
 07/28/2017 Imaging:  DEXA BONE DENSITY STUDY AXIAL Around 07/28/2017 Lab:  LIPID PANEL   
  
 07/28/2017 Imaging:  CATRACHO MAMMO BI SCREENING INCL CAD Around 07/28/2017 Lab:  METABOLIC PANEL, BASIC Referral Information Referral ID Referred By Referred To  
  
 [id number] Osman [address]O [address]   
   [address] [place] [address] [address] Visits Status Start Date End Date 1 New Request 7/25/17 7/25/18 If your referral has a status of pending review or denied, additional information will be sent to support the outcome of this decision. Referral ID Referred By Referred To  
 [id number] Gennaro Hammond DPM  
   [address] [facility] [address] [place] [address] [address] Phone: [phone] Fax: [phone] Visits Status Start Date End Date 1 New Request 7/25/17 7/25/18 If your referral has a status of pending review or denied, additional information will be sent to support the outcome of this decision. Patient Instructions Medicare Wellness Visit, Female The best way to live healthy is to have a healthy lifestyle by eating a well-balanced diet, exercising regularly, limiting alcohol and stopping smoking. Regular physical exams and screening tests are another way to keep healthy.  Preventive exams provided by your health care provider can find health problems before they become diseases or illnesses. Preventive services including immunizations, screening tests, monitoring and exams can help you take care of your own health. All people over age 72 should have a pneumovax  and and a prevnar shot to prevent pneumonia. These are once in a lifetime unless you and your provider decide differently. All people over 65 should have a yearly flu shot and a tetanus vaccine every 10 years. A bone mass density to screen for osteoporosis or thinning of the bones should be done every 2 years after 65. Screening for diabetes mellitus with a blood sugar test should be done every year. Glaucoma is a disease of the eye due to increased ocular pressure that can lead to blindness and it should be done every year by an eye professional. 
 
Cardiovascular screening tests that check for elevated lipids (fatty part of blood) which can lead to heart disease and strokes should be done every 5 years. Colorectal screening that evaluates for blood or polyps in your colon should be done yearly as a stool test or every five years as a flexible sigmoidoscope or every 10 years as a colonoscopy up to age 76. Breast cancer screening with a mammogram is recommended biennially  for women age 54-69. Screening for cervical cancer with a pap smear and pelvic exam is recommended for women after age 72 years every 2 years up to age 79 or when the provider and patient decide to stop. If there is a history of cervical abnormalities or other increased risk for cancer then the test is recommended yearly. Hepatitis C screening is also recommended for anyone born between 80 through [address]. A shingles vaccine is also recommended once in a lifetime after age 61. Your Medicare Wellness Exam is recommended annually. Here is a list of your current Health Maintenance items with a due date: 
Health Maintenance Due Topic Date Due  Cholesterol Test   1945 [facility] Diabetic Foot Care  07/18/1953 [facility] Eye Exam  07/18/1953  DTaP/Tdap/Td  (1 - Tdap) 07/18/1964  Stool testing for trace blood  07/18/1993  Shingles Vaccine  05/18/2003  Glaucoma Screening   07/18/2008  Bone Density Screening  07/18/2008  Pneumococcal Vaccine (1 of 2 - PCV13) 07/18/2008 [facility] Annual Well Visit  07/18/2008  Breast Cancer Screening  02/14/2014  
 
- Increase lisinopril from 10 to 20 mg. Can take 2 of current 10 mg tablet until new medicine available - Please consider getting shingles vaccine at pharmacy. - Please call and schedule colonoscopy and appointment with podiatrist 
 - To discuss living will and your preferences further, please call and schedule appointment with Maci Saleh. Malik Ferraro 1928 What is a living will? A living will is a legal form you use to write down the kind of care you want at the end of your life. It is used by the health professionals who will treat you if you aren't able to decide for yourself. If you put your wishes in writing, your loved ones and others will know what kind of care you want. They won't need to guess. This can ease your mind and be helpful to others. A living will is not the same as an estate or property will. An estate will explains what you want to happen with your money and property after you die. Is a living will a legal document? A living will is a legal document. Each state has its own laws about living blanc. If you move to another state, make sure that your living will is legal in the state where you now live. Or you might use a universal form that has been approved by many states. This kind of form can sometimes be completed and stored online. Your electronic copy will then be available wherever you have a connection to the Internet. In most cases, doctors will respect your wishes even if you have a form from a different state. · You don't need an  to complete a living will.  But legal advice can be helpful if your state's laws are unclear, your health history is complicated, or your family can't agree on what should be in your living will. · You can change your living will at any time. Some people find that their wishes about end-of-life care change as their health changes. · In addition to making a living will, think about completing a medical power of  form. This form lets you name the person you want to make end-of-life treatment decisions for you (your \"health care agent\") if you're not able to. Many hospitals and nursing homes will give you the forms you need to complete a living will and a medical power of . · Your living will is used only if you can't make or communicate decisions for yourself anymore. If you become able to make decisions again, you can accept or refuse any treatment, no matter what you wrote in your living will. · Your state may offer an online registry. This is a place where you can store your living will online so the doctors and nurses who need to treat you can find it right away. What should you think about when creating a living will? Talk about your end-of-life wishes with your family members and your doctor. Let them know what you want. That way the people making decisions for you won't be surprised by your choices. Think about these questions as you make your living will: · Do you know enough about life support methods that might be used? If not, talk to your doctor so you know what might be done if you can't breathe on your own, your heart stops, or you're unable to swallow. · What things would you still want to be able to do after you receive life-support methods? Would you want to be able to walk? To speak? To eat on your own? To live without the help of machines? · If you have a choice, where do you want to be cared for? In your home? At a hospital or nursing home? · Do you want certain Religion practices performed if you become very ill? · If you have a choice at the end of your life, where would you prefer to die? At home? In a hospital or nursing home? Somewhere else? · Would you prefer to be buried or cremated? · Do you want your organs to be donated after you die? What should you do with your living will? · Make sure that your family members and your health care agent have copies of your living will. · Give your doctor a copy of your living will to keep in your medical record. If you have more than one doctor, make sure that each one has a copy. · You may want to put a copy of your living will where it can be easily found. Where can you learn more? Go to http://justice-eladio.info/. Enter R257 in the search box to learn more about \"Learning About Living Perroy. \" Current as of: August 8, 2016 Content Version: 11.3 © 9801-3843 [employer]. Care instructions adapted under license by [employer] (which disclaims liability or warranty for this information). If you have questions about a medical condition or this instruction, always ask your healthcare professional. [address] any warranty or liability for your use of this information. Introducing [facility]! Jaja Dobson introduces [employer] patient portal. Now you can access parts of your medical record, email your doctor's office, and request medication refills online. 1. In your internet browser, go to [email or web address] 2. Click on the First Time User? Click Here link in the Sign In box. You will see the New Member Sign Up page. 3. Enter your [employer] Access Code exactly as it appears below. You will not need to use this code after youve completed the sign-up process. If you do not sign up before the expiration date, you must request a new code. · [employer] Access Code: [email or web address] Expires: 9/18/2017  9:51 AM 
 
 4. Enter the last four digits of your Social Security Number (xxxx) and Date of Birth (mm/dd/yyyy) as indicated and click Submit. You will be taken to the next sign-up page. 5. Create a [employer] ID. This will be your [employer] login ID and cannot be changed, so think of one that is secure and easy to remember. 6. Create a [employer] password. You can change your password at any time. 7. Enter your Password Reset Question and Answer. This can be used at a later time if you forget your password. 8. Enter your e-mail address. You will receive e-mail notification when new information is available in [address] 9. Click Sign Up. You can now view and download portions of your medical record. 10. Click the Download Summary menu link to download a portable copy of your medical information. If you have questions, please visit the Frequently Asked Questions section of the [employer] website. Remember, [employer] is NOT to be used for urgent needs. For medical emergencies, dial 911. Now available from your iPhone and Android! Please provide this summary of care documentation to your next provider. Your primary care clinician is listed as Kale [place]. If you have any questions after today's visit, please call [phone].

## 2017-07-25 NOTE — ACP (ADVANCE CARE PLANNING)
Discussed with patient and son. Has 2 adult sons and feels comfortable with either making decisions. However she feels her sons know her wishes, Kelsey Mccurdynish the son who accompanied her noted that he really doesn't. To follow-up with Ashok Dwyer to discuss further. Contact information provided.      Rajesh Bella MD

## 2017-08-07 ENCOUNTER — HOSPITAL ENCOUNTER (OUTPATIENT)
Dept: MAMMOGRAPHY | Age: 72
Discharge: HOME | End: 2017-08-07
Attending: INTERNAL MEDICINE
Payer: MEDICARE

## 2017-08-07 ENCOUNTER — TELEPHONE (OUTPATIENT)
Dept: INTERNAL MEDICINE CLINIC | Age: 72
End: 2017-08-07

## 2017-08-07 DIAGNOSIS — Z12.31 ENCOUNTER FOR SCREENING MAMMOGRAM FOR MALIGNANT NEOPLASM OF BREAST: ICD-10-CM

## 2017-08-07 DIAGNOSIS — Z78.0 POSTMENOPAUSAL: ICD-10-CM

## 2017-08-07 PROBLEM — M81.0 AGE-RELATED OSTEOPOROSIS WITHOUT CURRENT PATHOLOGICAL FRACTURE: Status: ACTIVE | Noted: 2017-08-07

## 2017-08-07 PROCEDURE — 77080 DXA BONE DENSITY AXIAL: CPT

## 2017-08-07 PROCEDURE — 77067 SCR MAMMO BI INCL CAD: CPT

## 2017-08-07 NOTE — TELEPHONE ENCOUNTER
Please advise patient her bone density test shows that she has severe osteoporosis. This puts her at high risk for fractures including hip fractures. I would like her to make an appointment to come and discuss, we will need to redraw her labs to check kidney function as well as vitamin D. This will clarify which medications we can use to help strengthen her bones.      Thanks  Ann Marie Alba MD

## 2017-08-14 ENCOUNTER — OFFICE VISIT (OUTPATIENT)
Dept: INTERNAL MEDICINE CLINIC | Age: 72
End: 2017-08-14

## 2017-08-14 VITALS
RESPIRATION RATE: 14 BRPM | TEMPERATURE: 98 F | OXYGEN SATURATION: 96 % | HEART RATE: 60 BPM | SYSTOLIC BLOOD PRESSURE: 158 MMHG | DIASTOLIC BLOOD PRESSURE: 61 MMHG

## 2017-08-14 DIAGNOSIS — M81.0 AGE-RELATED OSTEOPOROSIS WITHOUT CURRENT PATHOLOGICAL FRACTURE: Primary | ICD-10-CM

## 2017-08-14 DIAGNOSIS — N18.3 CKD (CHRONIC KIDNEY DISEASE), STAGE 3 (MODERATE): ICD-10-CM

## 2017-08-14 DIAGNOSIS — E11.21 DIABETIC NEPHROPATHY ASSOCIATED WITH TYPE 2 DIABETES MELLITUS (HCC): ICD-10-CM

## 2017-08-14 DIAGNOSIS — E11.59 CONTROLLED TYPE 2 DIABETES MELLITUS WITH OTHER CIRCULATORY COMPLICATION: ICD-10-CM

## 2017-08-14 DIAGNOSIS — R79.89 LOW VITAMIN D LEVEL: ICD-10-CM

## 2017-08-14 NOTE — MR AVS SNAPSHOT
Visit Information Date & Time Provider Department Dept. Phone Encounter #  
 8/14/2017 11:15 AM Ann Marie Alba MD [address] [facility] [place] [facility] [phone] [id number] Follow-up Instructions Return if symptoms worsen or fail to improve. Your Appointments 10/25/2017  9:30 AM  
ROUTINE CARE with Ann Marie Alba MD  
[facility] Appt Note: diabetes and htn 3mo fu  
 [address] [facility] [address]  
[facility] [address] [place] [address] Upcoming Health Maintenance Date Due  
 LIPID PANEL Q1 1945 EYE EXAM RETINAL OR DILATED Q1 7/18/1953 DTaP/Tdap/Td series (1 - Tdap) 7/18/1964 FOBT Q 1 YEAR AGE 50-75 7/18/1993 ZOSTER VACCINE AGE 60> 5/18/2003 GLAUCOMA SCREENING Q2Y 7/18/2008 INFLUENZA AGE 9 TO ADULT 8/1/2017 HEMOGLOBIN A1C Q6M 12/20/2017 MICROALBUMIN Q1 6/20/2018 Pneumococcal 65+ Low/Medium Risk (2 of 2 - PPSV23) 7/25/2018 FOOT EXAM Q1 7/25/2018 MEDICARE YEARLY EXAM 7/26/2018 BREAST CANCER SCRN MAMMOGRAM 8/7/2019 Allergies as of 8/14/2017  Review Complete On: 8/14/2017 By: Doug Patricio LPN No Known Allergies Current Immunizations  Reviewed on 7/25/2017 Name Date Pneumococcal Conjugate (PCV-13) 7/25/2017 11:00 AM  
  
 Not reviewed this visit You Were Diagnosed With   
  
 Codes Comments CKD (chronic kidney disease), stage 3 (moderate)    -  Primary ICD-10-CM: N18.3 ICD-9-CM: 010. 3 Controlled type 2 diabetes mellitus with other circulatory complication (HCC)     P-86-JG: E11.59 
ICD-9-CM: 250.70 Diabetic nephropathy associated with type 2 diabetes mellitus ([facility] [address]     ICD-10-CM: E11.21 
ICD-9-CM: 250.40, 583.81 Age-related osteoporosis without current pathological fracture     ICD-10-CM: M81.0 ICD-9-CM: 733.01 Low vitamin D level     ICD-10-CM: E55.9 ICD-9-CM: 268.9 Vitals BP Pulse Temp Resp LMP SpO2  
 158/61 (BP 1 Location: Left arm, BP Patient Position: Sitting) 60 98 °F (36.7 °C) (Oral) 14 (LMP Unknown) 96% OB Status Smoking Status Hysterectomy Former Smoker Preferred Pharmacy Pharmacy Name Phone [address] [facility] [address] [place] [address], [facility] [address] [phone] Your Updated Medication List  
  
   
This list is accurate as of: 8/14/17 12:23 PM.  Always use your most recent med list. amLODIPine 5 mg tablet Commonly known as:  Selena Shield Take 1 Tab by mouth daily. cloNIDine HCl 0.1 mg tablet Commonly known as:  CATAPRES Take 1 Tab by mouth two (2) times a day. furosemide 40 mg tablet Commonly known as:  LASIX Take 1 Tab by mouth daily. gabapentin 100 mg capsule Commonly known as:  NEURONTIN Take 2 Caps by mouth two (2) times a day. hydrALAZINE 10 mg tablet Commonly known as:  APRESOLINE Take 1 Tab by mouth three (3) times daily. insulin glargine 100 unit/mL (3 mL) Inpn Commonly known as:  LANTUS SOLOSTAR  
26 Units by SubCUTAneous route daily. lisinopril 20 mg tablet Commonly known as:  Ernestine Bills Take 1 Tab by mouth daily. metoprolol succinate 50 mg XL tablet Commonly known as:  TOPROL-XL Take 1 Tab by mouth daily. TRUE METRIX GLUCOSE TEST STRIP strip Generic drug:  glucose blood VI test strips We Performed the Following METABOLIC PANEL, BASIC [06905 CPT(R)] PTH INTACT [94493 CPT(R)] REFERRAL TO NEPHROLOGY [PIJ86 Custom] Comments:  
 Please evaluate patient for ckd, osteoporosis, htn. VITAMIN D, 25 HYDROXY [id number] CPT(R)] Follow-up Instructions Return if symptoms worsen or fail to improve. Referral Information  Referral ID Referred By Referred To  
  
 [id number] Morena Velásquez MD   
 [address] [place] [address],[address] Randi Rizo [address] Phone: [phone] Fax: [phone] Visits Status Start Date End Date 1 New Request 8/14/17 8/14/18 If your referral has a status of pending review or denied, additional information will be sent to support the outcome of this decision. Patient Instructions Osteoporosis: Care Instructions Your Care Instructions Osteoporosis causes bones to become thin and weak. It is much more common in women than in men. Osteoporosis may be very advanced before you know you have it. Sometimes the first sign is a broken bone in the hip, spine, or wrist or sudden pain in your middle or lower back. Follow-up care is a key part of your treatment and safety. Be sure to make and go to all appointments, and call your doctor if you are having problems. It's also a good idea to know your test results and keep a list of the medicines you take. How can you care for yourself at home? · Your doctor may prescribe a bisphosphonate, such as risedronate (Actonel) or alendronate (Fosamax), for osteoporosis. If you are taking one of these medicines by mouth: 
¨ Take your medicine with a full glass of water when you first get up in the morning. ¨ Do not lie down, eat, drink a beverage, or take any other medicine for at least 30 minutes after taking the drug. This helps prevent stomach problems. ¨ Do not take your medicine late in the day if you forgot to take it in the morning. Skip it, and take the usual dose the next morning. ¨ If you have side effects, tell your doctor. He or she may prescribe another medicine. · Get enough calcium and vitamin D. The [place] of Medicine recommends adults younger than age 46 need 1,000 mg of calcium and 600 IU of vitamin D each day. Women ages 46 to 79 need 1,200 mg of calcium and 600 IU of vitamin D each day.  Men ages 46 to 79 need 1,000 mg of calcium and 600 IU of vitamin D each day. Adults 71 and older need 1,200 mg of calcium and 800 IU of vitamin D each day. ¨ Eat foods rich in calcium, like yogurt, cheese, milk, and dark green vegetables. This is a good way to get the calcium you need. You can get vitamin D from eggs, fatty fish, cereal, and milk. ¨ Talk to your doctor about taking a calcium plus vitamin D supplement. Be careful, though. Adults ages 23 to 48 should not get more than 2,500 mg of calcium and 4,000 IU of vitamin D each day, whether it is from supplements and/or food. Adults ages 46 and older should not get more than 2,000 mg of calcium and 4,000 IU of vitamin D each day from supplements and/or food. · Limit alcohol to 2 drinks a day for men and 1 drink a day for women. Too much alcohol can cause health problems. · Do not smoke. Smoking puts you at a much higher risk for osteoporosis. If you need help quitting, talk to your doctor about stop-smoking programs and medicines. These can increase your chances of quitting for good. · Get regular bone-building exercise. Weight-bearing and resistance exercises keep bones healthy by working the muscles and bones against gravity. Start out at an exercise level that feels right for you. Add a little at a time until you can do the following: ¨ Do 30 minutes of weight-bearing exercise on most days of the week. Walking, jogging, stair climbing, and dancing are good choices. ¨ Do resistance exercises with weights or elastic bands 2 to 3 days a week. · Reduce your risk of falls: 
¨ Wear supportive shoes with low heels and nonslip soles. ¨ Use a cane or walker, if you need it. Use shower chairs and bath benches. Put in handrails on stairways, around your shower or tub area, and near the toilet. ¨ Keep stairs, porches, and walkways well lit. Use night-lights. ¨ Remove throw rugs and other objects that are in the way. ¨ Avoid icy, wet, or slippery surfaces. ¨ Keep a cordless phone and a flashlight with new batteries by your bed. When should you call for help? Watch closely for changes in your health, and be sure to contact your doctor if you have any problems. Where can you learn more? Go to http://justice-eladio.info/. Enter K100 in the search box to learn more about \"Osteoporosis: Care Instructions. \" Current as of: August 9, 2016 Content Version: 11.3 © 7937-1435 ice. Care instructions adapted under license by [employer] (which disclaims liability or warranty for this information). If you have questions about a medical condition or this instruction, always ask your healthcare professional. [address] any warranty or liability for your use of this information. Introducing hospitals & HEALTH SERVICES! Liang Aviles introduces The [employer] (AMI) patient portal. Now you can access parts of your medical record, email your doctor's office, and request medication refills online. 1. In your internet browser, go to [email or web address] 2. Click on the First Time User? Click Here link in the Sign In box. You will see the New Member Sign Up page. 3. Enter your [employer] (AMI) Access Code exactly as it appears below. You will not need to use this code after youve completed the sign-up process. If you do not sign up before the expiration date, you must request a new code. · The [employer] (AMI) Access Code: [email or web address] Expires: 9/18/2017  9:51 AM 
 
4. Enter the last four digits of your Social Security Number (xxxx) and Date of Birth (mm/dd/yyyy) as indicated and click Submit. You will be taken to the next sign-up page. 5. Create a [employer] (AMI) ID. This will be your [employer] (AMI) login ID and cannot be changed, so think of one that is secure and easy to remember. 6. Create a [employer] (AMI) password. You can change your password at any time. 7. Enter your Password Reset Question and Answer.  This can be used at a later time if you forget your password. 8. Enter your e-mail address. You will receive e-mail notification when new information is available in [address] 9. Click Sign Up. You can now view and download portions of your medical record. 10. Click the Download Summary menu link to download a portable copy of your medical information. If you have questions, please visit the Frequently Asked Questions section of the [employer] website. Remember, [employer] is NOT to be used for urgent needs. For medical emergencies, dial 911. Now available from your iPhone and Android! Please provide this summary of care documentation to your next provider. Your primary care clinician is listed as Tahmina Hwang. If you have any questions after today's visit, please call [phone].

## 2017-08-14 NOTE — PROGRESS NOTES
Rm#3  Chief Complaint   Patient presents with    Results     DEXA SCAN      1. Have you been to the ER, urgent care clinic since your last visit? Hospitalized since your last visit? No    2. Have you seen or consulted any other health care providers outside of the [address] [place] [address] since your last visit? Include any pap smears or colon screening.  No  Health Maintenance Due   Topic Date Due    LIPID PANEL Q1  1945    EYE EXAM RETINAL OR DILATED Q1  07/18/1953    DTaP/Tdap/Td series (1 - Tdap) 07/18/1964    FOBT Q 1 YEAR AGE 50-75  07/18/1993    ZOSTER VACCINE AGE 60>  05/18/2003    GLAUCOMA SCREENING Q2Y  07/18/2008    INFLUENZA AGE 9 TO ADULT  08/01/2017     Last eye exam/glaucoma  pt states its been awhile  Flu vaccine will return      reviewed

## 2017-08-14 NOTE — PROGRESS NOTES
HPI   Lc Donato is a 74 y.o. female, she presents today for:    Osteoporosis: by dexa scan. No history of broken bones but has limited mobility secondary to PVD and amputations. She was never aware of osteoporosis previously. CKD: Dr. Zayda Pino saw patient in the hospital (nephrologist) and this is who she would want to follow-up with if needed for advancing kidney disease. PMH/PSH: reviewed and updated  Sochx:  reports that she has quit smoking. She quit after 15.00 years of use. She has never used smokeless tobacco. She reports that she does not drink alcohol or use illicit drugs. Famhx: reviewed and updated     All: No Known Allergies  Med:   Current Outpatient Prescriptions   Medication Sig    TRUE METRIX GLUCOSE TEST STRIP strip     lisinopril (PRINIVIL, ZESTRIL) 20 mg tablet Take 1 Tab by mouth daily.  metoprolol succinate (TOPROL-XL) 50 mg XL tablet Take 1 Tab by mouth daily.  cloNIDine HCl (CATAPRES) 0.1 mg tablet Take 1 Tab by mouth two (2) times a day.  amLODIPine (NORVASC) 5 mg tablet Take 1 Tab by mouth daily.  hydrALAZINE (APRESOLINE) 10 mg tablet Take 1 Tab by mouth three (3) times daily.  furosemide (LASIX) 40 mg tablet Take 1 Tab by mouth daily.  gabapentin (NEURONTIN) 100 mg capsule Take 2 Caps by mouth two (2) times a day.  insulin glargine (LANTUS SOLOSTAR) 100 unit/mL (3 mL) inpn 26 Units by SubCUTAneous route daily. No current facility-administered medications for this visit. Review of Systems   Constitutional: Negative for chills, fever and malaise/fatigue. Respiratory: Negative for shortness of breath. Cardiovascular: Negative for chest pain. PE:  Blood pressure 158/61, pulse 60, temperature 98 °F (36.7 °C), temperature source Oral, resp. rate 14, SpO2 96 %. There is no height or weight on file to calculate BMI. Physical Exam   Constitutional: She is oriented to person, place, and time. She appears well-developed and well-nourished.  No distress. HENT:   Head: Normocephalic. Mouth/Throat: Oropharynx is clear and moist.   Eyes: Conjunctivae and EOM are normal.   Neck: Neck supple. Cardiovascular: Normal rate, regular rhythm and normal heart sounds. Pulmonary/Chest: Effort normal and breath sounds normal.   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 Skin: Skin is warm and dry. Nursing note and vitals reviewed. Labs:   No results found for any visits on 08/14/17. A/P:  74 y.o. female    ICD-10-CM ICD-9-CM    1. CKD (chronic kidney disease), stage 3 (moderate) N18.3 585.3 VITAMIN D, 25 HYDROXY      PTH INTACT      REFERRAL TO NEPHROLOGY   2. Controlled type 2 diabetes mellitus with other circulatory complication (HCC) Y42.14 969.58 METABOLIC PANEL, BASIC      REFERRAL TO NEPHROLOGY   3. Diabetic nephropathy associated with type 2 diabetes mellitus ([facility] W95.83 845.91 METABOLIC PANEL, BASIC     583.81 PTH INTACT   4. Age-related osteoporosis without current pathological fracture G48.1 680.50 METABOLIC PANEL, BASIC      VITAMIN D, 25 HYDROXY      PTH INTACT   5. Low vitamin D level E55.9 268.9 VITAMIN D, 25 HYDROXY      PTH INTACT     Osteoporosis in setting of CKD: Bone thinness liekly combination of age, imparied mobility and CKD. (On loop diuretic, duration unknown). - if kdiney disease stable from June, not a candidate for bisphosponate. Check PTH. Will likely have her follow-up with nephrology for further care of kidneys and osteoporosis. BP improved today with addition of lisinopril compared to last visit. Recheck kidney function.     - She was given AVS and expressed understanding with the diagnosis and plan as discussed.   Follow-up Disposition: Not on File  Future Appointments  Date Time Provider Inge Aburto   10/25/2017 9:30 AM Naomy Vanegas MD [address] [facility]

## 2017-08-14 NOTE — PATIENT INSTRUCTIONS
Osteoporosis: Care Instructions  Your Care Instructions    Osteoporosis causes bones to become thin and weak. It is much more common in women than in men. Osteoporosis may be very advanced before you know you have it. Sometimes the first sign is a broken bone in the hip, spine, or wrist or sudden pain in your middle or lower back. Follow-up care is a key part of your treatment and safety. Be sure to make and go to all appointments, and call your doctor if you are having problems. It's also a good idea to know your test results and keep a list of the medicines you take. How can you care for yourself at home? · Your doctor may prescribe a bisphosphonate, such as risedronate (Actonel) or alendronate (Fosamax), for osteoporosis. If you are taking one of these medicines by mouth:  ¨ Take your medicine with a full glass of water when you first get up in the morning. ¨ Do not lie down, eat, drink a beverage, or take any other medicine for at least 30 minutes after taking the drug. This helps prevent stomach problems. ¨ Do not take your medicine late in the day if you forgot to take it in the morning. Skip it, and take the usual dose the next morning. ¨ If you have side effects, tell your doctor. He or she may prescribe another medicine. · Get enough calcium and vitamin D. The [place] of Medicine recommends adults younger than age 46 need 1,000 mg of calcium and 600 IU of vitamin D each day. Women ages 46 to 79 need 1,200 mg of calcium and 600 IU of vitamin D each day. Men ages 46 to 79 need 1,000 mg of calcium and 600 IU of vitamin D each day. Adults 71 and older need 1,200 mg of calcium and 800 IU of vitamin D each day. ¨ Eat foods rich in calcium, like yogurt, cheese, milk, and dark green vegetables. This is a good way to get the calcium you need. You can get vitamin D from eggs, fatty fish, cereal, and milk. ¨ Talk to your doctor about taking a calcium plus vitamin D supplement. Be careful, though. Adults ages 23 to 48 should not get more than 2,500 mg of calcium and 4,000 IU of vitamin D each day, whether it is from supplements and/or food. Adults ages 46 and older should not get more than 2,000 mg of calcium and 4,000 IU of vitamin D each day from supplements and/or food. · Limit alcohol to 2 drinks a day for men and 1 drink a day for women. Too much alcohol can cause health problems. · Do not smoke. Smoking puts you at a much higher risk for osteoporosis. If you need help quitting, talk to your doctor about stop-smoking programs and medicines. These can increase your chances of quitting for good. · Get regular bone-building exercise. Weight-bearing and resistance exercises keep bones healthy by working the muscles and bones against gravity. Start out at an exercise level that feels right for you. Add a little at a time until you can do the following:  ¨ Do 30 minutes of weight-bearing exercise on most days of the week. Walking, jogging, stair climbing, and dancing are good choices. ¨ Do resistance exercises with weights or elastic bands 2 to 3 days a week. · Reduce your risk of falls:  ¨ Wear supportive shoes with low heels and nonslip soles. ¨ Use a cane or walker, if you need it. Use shower chairs and bath benches. Put in handrails on stairways, around your shower or tub area, and near the toilet. ¨ Keep stairs, porches, and walkways well lit. Use night-lights. ¨ Remove throw rugs and other objects that are in the way. ¨ Avoid icy, wet, or slippery surfaces. ¨ Keep a cordless phone and a flashlight with new batteries by your bed. When should you call for help? Watch closely for changes in your health, and be sure to contact your doctor if you have any problems. Where can you learn more? Go to http://justice-eladio.info/. Enter K100 in the search box to learn more about \"Osteoporosis: Care Instructions. \"  Current as of: August 9, 2016  Content Version: 11.3  © 6873-8243 [employer]. Care instructions adapted under license by [employer] (which disclaims liability or warranty for this information). If you have questions about a medical condition or this instruction, always ask your healthcare professional. [address] any warranty or liability for your use of this information.

## 2017-08-15 ENCOUNTER — TELEPHONE (OUTPATIENT)
Dept: INTERNAL MEDICINE CLINIC | Age: 72
End: 2017-08-15

## 2017-08-15 DIAGNOSIS — M81.0 AGE-RELATED OSTEOPOROSIS WITHOUT CURRENT PATHOLOGICAL FRACTURE: ICD-10-CM

## 2017-08-15 DIAGNOSIS — E11.42 DIABETIC POLYNEUROPATHY ASSOCIATED WITH TYPE 2 DIABETES MELLITUS (HCC): Primary | ICD-10-CM

## 2017-08-15 DIAGNOSIS — E55.9 VITAMIN D DEFICIENCY: ICD-10-CM

## 2017-08-15 DIAGNOSIS — N18.3 CKD (CHRONIC KIDNEY DISEASE), STAGE 3 (MODERATE): ICD-10-CM

## 2017-08-15 LAB
25(OH)D3+25(OH)D2 SERPL-MCNC: 13.6 NG/ML (ref 30–100)
BUN SERPL-MCNC: 45 MG/DL (ref 8–27)
BUN/CREAT SERPL: 24 (ref 12–28)
CALCIUM SERPL-MCNC: 9.4 MG/DL (ref 8.7–10.3)
CHLORIDE SERPL-SCNC: 105 MMOL/L (ref 96–106)
CO2 SERPL-SCNC: 22 MMOL/L (ref 18–29)
CREAT SERPL-MCNC: 1.91 MG/DL (ref 0.57–1)
GLUCOSE SERPL-MCNC: 167 MG/DL (ref 65–99)
POTASSIUM SERPL-SCNC: 5.1 MMOL/L (ref 3.5–5.2)
PTH-INTACT SERPL-MCNC: 62 PG/ML (ref 15–65)
SODIUM SERPL-SCNC: 142 MMOL/L (ref 134–144)

## 2017-08-15 RX ORDER — CHOLECALCIFEROL (VITAMIN D3) 125 MCG
1 TABLET ORAL DAILY
Qty: 90 TAB | Refills: 1 | Status: SHIPPED | OUTPATIENT
Start: 2017-08-15 | End: 2018-02-09 | Stop reason: SDUPTHER

## 2017-08-15 NOTE — LETTER
8/16/2017 5:09 PM 
 
Ms. Gavin Flores [facility] [address] [facility] [address] Dear Ms. Berger: The results of your lab work performed in our office were abnormal and we have had difficulty reaching you by telephone. Kidney function not improved, in fact it may be a little worse. She should stop the lisinopril and follow-up with nephrology (Dr. Mary Lou Melgoza). Also with borderline vit D. I would like her to start a vit d -2 supplement. And have written this as prescription. Please fax referral information including most recent office note to Dr. Mary Lou Melgoza. For \"worsening CKD in setting of HTN and PVD\" trialled lisinopril for 1 month, holding now with rise in creatinine from 1.8 to 1.9\" 
  
 
 
 
 
Please contact our office as soon as possible to discuss these results.  
 
 
 
Sincerely, 
 
 
Grey Lal MD

## 2017-08-15 NOTE — TELEPHONE ENCOUNTER
Please call patient and advise:     Kidney function not improved, in fact it may be a little worse. She should stop the lisinopril and follow-up with nephrology (Dr. Ken Pink). Also with borderline vit D. I would like her to start a vit d -2 supplement. And have written this as prescription. Please fax referral information including most recent office note to Dr. Ken Pink.  For   \"worsening CKD in setting of HTN and PVD\"  \"trialled lisinopril for 1 month, holding now with rise in creatinine from 1.8 to 1.9\"    Thanks  Crissy Peña MD

## 2017-08-15 NOTE — PROGRESS NOTES
Rising creatinine. To follow-up with nephrology for both CKD, HTN and osteoporosis. See phone note  Low Vit D, plan to start on gentle repletion.

## 2017-09-02 ENCOUNTER — TELEPHONE (OUTPATIENT)
Dept: INTERNAL MEDICINE CLINIC | Age: 72
End: 2017-09-02

## 2017-09-03 NOTE — TELEPHONE ENCOUNTER
On call note:    Pt calls to report diarrhea x 3 days    No abx  No known sick exposures. No fever. Not dizzy or lightheaded. No N/V    abd feels \"sore\" in lower abd but not painful. Agreed that she should eat yogurt at least daily. Hold lasix for now  Maintain good PO fluid intake  Reviewed imodium only as last resort since may exac certain infectious etiologies. If fever, focal abd pain, dizziness or general worsening of condition then seek urgent medical attention. O/w may f/u with us next week for eval.    plan reviewed with pt and questions answered and pt expressed understanding.

## 2017-10-06 ENCOUNTER — TELEPHONE (OUTPATIENT)
Dept: INTERNAL MEDICINE CLINIC | Age: 72
End: 2017-10-06

## 2017-10-06 DIAGNOSIS — E11.59 TYPE 2 DIABETES MELLITUS WITH VASCULAR DISEASE (HCC): ICD-10-CM

## 2017-10-06 DIAGNOSIS — N18.30 CHRONIC KIDNEY DISEASE, STAGE III (MODERATE) (HCC): Primary | ICD-10-CM

## 2017-10-25 ENCOUNTER — OFFICE VISIT (OUTPATIENT)
Dept: INTERNAL MEDICINE CLINIC | Age: 72
End: 2017-10-25

## 2017-10-25 VITALS
HEIGHT: 66 IN | DIASTOLIC BLOOD PRESSURE: 64 MMHG | SYSTOLIC BLOOD PRESSURE: 138 MMHG | HEART RATE: 67 BPM | OXYGEN SATURATION: 98 % | RESPIRATION RATE: 20 BRPM | TEMPERATURE: 98 F

## 2017-10-25 DIAGNOSIS — N18.30 STAGE 3 CHRONIC KIDNEY DISEASE (HCC): ICD-10-CM

## 2017-10-25 DIAGNOSIS — Z79.4 TYPE 2 DIABETES MELLITUS WITH COMPLICATION, WITH LONG-TERM CURRENT USE OF INSULIN (HCC): ICD-10-CM

## 2017-10-25 DIAGNOSIS — E11.21 DIABETIC NEPHROPATHY ASSOCIATED WITH TYPE 2 DIABETES MELLITUS (HCC): ICD-10-CM

## 2017-10-25 DIAGNOSIS — I10 ESSENTIAL HYPERTENSION: ICD-10-CM

## 2017-10-25 DIAGNOSIS — Z23 ENCOUNTER FOR IMMUNIZATION: ICD-10-CM

## 2017-10-25 DIAGNOSIS — E11.8 TYPE 2 DIABETES MELLITUS WITH COMPLICATION, WITH LONG-TERM CURRENT USE OF INSULIN (HCC): ICD-10-CM

## 2017-10-25 DIAGNOSIS — R79.89 LOW VITAMIN D LEVEL: ICD-10-CM

## 2017-10-25 DIAGNOSIS — E11.59 CONTROLLED TYPE 2 DIABETES MELLITUS WITH OTHER CIRCULATORY COMPLICATION, WITHOUT LONG-TERM CURRENT USE OF INSULIN (HCC): Primary | ICD-10-CM

## 2017-10-25 DIAGNOSIS — S78.119A UNILATERAL AKA (HCC): ICD-10-CM

## 2017-10-25 DIAGNOSIS — Z12.11 SCREEN FOR COLON CANCER: ICD-10-CM

## 2017-10-25 DIAGNOSIS — E11.42 DIABETIC POLYNEUROPATHY ASSOCIATED WITH TYPE 2 DIABETES MELLITUS (HCC): ICD-10-CM

## 2017-10-25 LAB — HBA1C MFR BLD HPLC: 7.4 % (ref 4.8–5.6)

## 2017-10-25 RX ORDER — LISINOPRIL 20 MG/1
20 TABLET ORAL DAILY
COMMUNITY
Start: 2017-10-23 | End: 2020-07-01

## 2017-10-25 RX ORDER — INSULIN GLARGINE 100 [IU]/ML
22 INJECTION, SOLUTION SUBCUTANEOUS DAILY
Qty: 5 PEN | Refills: 5 | Status: SHIPPED | OUTPATIENT
Start: 2017-10-25 | End: 2018-07-27 | Stop reason: SDUPTHER

## 2017-10-25 NOTE — PROGRESS NOTES
FLOWER Garcia is a 74 y.o. female, she presents today for:    Since last visit, seen by nephrology, Dr. Eduar Benitez. Baseline creatinine 1.9 per Dr. Eduar Benitez. Drinks cranberry juice and water. Drinks juice every morning. This 87. Morning blood sugars typically 90s, higher    Taking vitamin D. Has not had eye exam yet this year. Has not yet called to schedule colonoscopy. PMH/PSH: reviewed and updated  Sochx:  reports that she has quit smoking. She quit after 15.00 years of use. She has never used smokeless tobacco. She reports that she does not drink alcohol or use illicit drugs. Famhx: reviewed and updated     All: No Known Allergies  Med:   Current Outpatient Prescriptions   Medication Sig    lisinopril (PRINIVIL, ZESTRIL) 20 mg tablet Take 20 mg by mouth daily.  ergocalciferol, vitamin D2, 2,000 unit tab Take 1 Tab by mouth daily.  TRUE METRIX GLUCOSE TEST STRIP strip     metoprolol succinate (TOPROL-XL) 50 mg XL tablet Take 1 Tab by mouth daily.  cloNIDine HCl (CATAPRES) 0.1 mg tablet Take 1 Tab by mouth two (2) times a day.  amLODIPine (NORVASC) 5 mg tablet Take 1 Tab by mouth daily.  hydrALAZINE (APRESOLINE) 10 mg tablet Take 1 Tab by mouth three (3) times daily.  furosemide (LASIX) 40 mg tablet Take 1 Tab by mouth daily.  gabapentin (NEURONTIN) 100 mg capsule Take 2 Caps by mouth two (2) times a day.  insulin glargine (LANTUS SOLOSTAR) 100 unit/mL (3 mL) inpn 26 Units by SubCUTAneous route daily. No current facility-administered medications for this visit. Review of Systems   Constitutional: Negative for chills, fever and malaise/fatigue. Respiratory: Negative for shortness of breath. Cardiovascular: Negative for chest pain. \"I am feeling well\"    PE:  Blood pressure 138/64, pulse 67, temperature 98 °F (36.7 °C), temperature source Oral, resp. rate 20, height 5' 5.5\" (1.664 m), SpO2 98 %.   There is no height or weight on file to calculate BMI.  Physical Exam   Constitutional: She is oriented to person, place, and time. She appears well-developed and well-nourished. No distress. HENT:   Head: Normocephalic. Mouth/Throat: Oropharynx is clear and moist.   Eyes: Conjunctivae and EOM are normal.   Neck: Neck supple. Cardiovascular: Normal rate, regular rhythm and normal heart sounds. Pulmonary/Chest: Effort normal and breath sounds normal.   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 Skin: Skin is warm and dry. Nursing note and vitals reviewed. Labs:   Results for orders placed or performed in visit on 10/25/17   VITAMIN D, 25 HYDROXY   Result Value Ref Range    VITAMIN D, 25-HYDROXY 42.8 30.0 - 100.0 ng/mL    Narrative    Performed at:  [address]  [id number]  : Hany Park MD, Phone:  [phone]   METABOLIC PANEL, BASIC   Result Value Ref Range    Glucose 125 (H) 65 - 99 mg/dL    BUN 40 (H) 8 - 27 mg/dL    Creatinine 1.79 (H) 0.57 - 1.00 mg/dL    BUN/Creatinine ratio 22 12 - 28    Sodium 143 134 - 144 mmol/L    Potassium 4.2 3.5 - 5.2 mmol/L    Chloride 105 96 - 106 mmol/L    CO2 23 18 - 29 mmol/L    Calcium 9.5 8.7 - 10.3 mg/dL    Narrative    Performed at:  [address]  [id number]  : Hany Park MD, Phone:  [phone]   AMB POC HEMOGLOBIN A1C   Result Value Ref Range    Hemoglobin A1c (POC) 7.4 (A) 4.8 - 5.6 %       A/P:  74 y.o. female    ICD-10-CM ICD-9-CM    1. Controlled type 2 diabetes mellitus with other circulatory complication, without long-term current use of insulin ([facility]) E11.59 250.70 AMB POC HEMOGLOBIN A1C   2. Diabetic polyneuropathy associated with type 2 diabetes mellitus ([facility]) E11.42 250.60      357.2    3.  Type 2 diabetes mellitus with complication, with long-term current use of insulin ([facility]) E11.8 250.90 Z79.4 V58.67    4. Encounter for immunization Z23 V03.89 INFLUENZA VIRUS VACCINE, HIGH DOSE SEASONAL, PRESERVATIVE FREE     DMII: A1C goal < 7.5. At goal today with 7.4. On basal insulin. Not a candidate for metformin d/t CKD. - decrease basal insulin as she has been having to drink juice QAM to maintain blood sugar. Discussed stopping juice and gentle titration to fasting blood glucose only. See patient instructions. - hold on starting meal dosing at this time. HTN/CKD: BP at goal. Continues to follow with Dr. Charlene Cohen    Osteoporosis: Nephrologist Dr. Charlene Cohen following for 2nd hyperP. Not a candidate for bisphosphonate. Low Vitamin D: improved with gentle repletion. Continue vit D 2.      - She was given AVS and expressed understanding with the diagnosis and plan as discussed. Follow-up Disposition:  Return in about 3 months (around 1/25/2018) for well woman.

## 2017-10-25 NOTE — MR AVS SNAPSHOT
Visit Information Date & Time Provider Department Dept. Phone Encounter #  
 10/25/2017 11:15 AM Mathew Regan MD [id number] [facility] Internal Medicine [phone] Follow-up Instructions Return in about 3 months (around 1/25/2018) for well woman. Upcoming Health Maintenance Date Due  
 LIPID PANEL Q1 1945 EYE EXAM RETINAL OR DILATED Q1 7/18/1953 DTaP/Tdap/Td series (1 - Tdap) 7/18/1964 FOBT Q 1 YEAR AGE 50-75 7/18/1993 ZOSTER VACCINE AGE 60> 5/18/2003 GLAUCOMA SCREENING Q2Y 7/18/2008 INFLUENZA AGE 9 TO ADULT 8/1/2017 HEMOGLOBIN A1C Q6M 12/20/2017 MICROALBUMIN Q1 6/20/2018 Pneumococcal 65+ Low/Medium Risk (2 of 2 - PPSV23) 7/25/2018 FOOT EXAM Q1 7/25/2018 MEDICARE YEARLY EXAM 7/26/2018 Allergies as of 10/25/2017  Review Complete On: 10/25/2017 By: Irvin Shannon LPN No Known Allergies Current Immunizations  Reviewed on 7/25/2017 Name Date Influenza High Dose Vaccine PF  Incomplete Pneumococcal Conjugate (PCV-13) 7/25/2017 11:00 AM  
  
 Not reviewed this visit You Were Diagnosed With   
  
 Codes Comments Controlled type 2 diabetes mellitus with other circulatory complication, without long-term current use of insulin ([facility]    -  Primary ICD-10-CM: E11.59 
ICD-9-CM: 250.70 Diabetic polyneuropathy associated with type 2 diabetes mellitus ([facility].)     ICD-10-CM: E11.42 
ICD-9-CM: 250.60, 357.2 Type 2 diabetes mellitus with complication, with long-term current use of insulin ([facility])     ICD-10-CM: E11.8, Z79.4 ICD-9-CM: 250.90, V58.67 Encounter for immunization     ICD-10-CM: X76 ICD-9-CM: V03.89 Screen for colon cancer     ICD-10-CM: Z12.11 ICD-9-CM: V76.51 Unilateral AKA ([facility]     ICD-10-CM: L43.550 ICD-9-CM: V49.76 Stage 3 chronic kidney disease     ICD-10-CM: N18.3 ICD-9-CM: 403. 3 Low vitamin D level     ICD-10-CM: E55.9 ICD-9-CM: 268.9 Vitals BP Pulse Temp Resp Height(growth percentile) LMP  
 138/64 (BP 1 Location: Right arm, BP Patient Position: Sitting) 67 98 °F (36.7 °C) (Oral) 20 5' 5.5\" (1.664 m) (LMP Unknown) SpO2 OB Status Smoking Status 98% Hysterectomy Former Smoker Preferred Pharmacy Pharmacy Name Phone [address] [facility] [address], [facility] [address] [phone] Your Updated Medication List  
  
   
This list is accurate as of: 10/25/17 11:55 AM.  Always use your most recent med list. amLODIPine 5 mg tablet Commonly known as:  Tim Julito Take 1 Tab by mouth daily. cloNIDine HCl 0.1 mg tablet Commonly known as:  CATAPRES Take 1 Tab by mouth two (2) times a day. ergocalciferol (vitamin D2) 2,000 unit Tab Take 1 Tab by mouth daily. furosemide 40 mg tablet Commonly known as:  LASIX Take 1 Tab by mouth daily. gabapentin 100 mg capsule Commonly known as:  NEURONTIN Take 2 Caps by mouth two (2) times a day. hydrALAZINE 10 mg tablet Commonly known as:  APRESOLINE Take 1 Tab by mouth three (3) times daily. insulin glargine 100 unit/mL (3 mL) Inpn Commonly known as:  LANTUS SOLOSTAR  
22 Units by SubCUTAneous route daily. lisinopril 20 mg tablet Commonly known as:  Farida Victoriano Take 20 mg by mouth daily. metoprolol succinate 50 mg XL tablet Commonly known as:  TOPROL-XL Take 1 Tab by mouth daily. TRUE METRIX GLUCOSE TEST STRIP strip Generic drug:  glucose blood VI test strips Prescriptions Sent to Pharmacy Refills  
 insulin glargine (LANTUS SOLOSTAR) 100 unit/mL (3 mL) inpn 5 Si Units by SubCUTAneous route daily. Class: Normal  
 Pharmacy: [facility] [address] [place] [address], [facility] [address] Ph #: [phone] Route: SubCUTAneous We Performed the Following AMB POC HEMOGLOBIN A1C [26056 CPT(R)] AMB POC HEMOGLOBIN A1C [81600 CPT(R)] FECAL BLOOD SCRN IMMUNOASSAY [id number] [facility] [place]] INFLUENZA VIRUS VACCINE, HIGH DOSE SEASONAL, PRESERVATIVE FREE [21901 CPT(R)] METABOLIC PANEL, BASIC [55544 CPT(R)] REFERRAL TO OPHTHALMOLOGY [REF57 Custom] Comments:  
 Reason for request: Diabetic eye Exam  
 VITAMIN D, 25 HYDROXY [75408 CPT(R)] Follow-up Instructions Return in about 3 months (around 1/25/2018) for well woman. To-Do List   
 10/25/2017 Lab:  LIPID PANEL Referral Information Referral ID Referred By Referred To [id number] Gómez Telles MD   
   [address] Phone: [phone] Fax: [phone] Visits Status Start Date End Date 1 New Request 10/25/17 10/25/18 If your referral has a status of pending review or denied, additional information will be sent to support the outcome of this decision. Patient Instructions Plan for  long acting insulin, with patient managed dose adjustment. Blood Sugar log book: Please keep a log of your blood sugar levels and insulin administration as follows. Your insurance will pay for 3 times a day testing now that you are on insulin. Please ольга if you need additional prescriptions. - Morning fasting blood sugar (before eating breakfast) - Before lunch or before dinner blood sugar. 
 - Before bed blood sugar Insulin type: glargine Starting dose: 22 units, administer nightly. You will adjust your insulin dose according to the fasting blood sugar only (this is the before breakfast blood sugar). After 3 days: - If fasting blood sugar is > 150 each day, increase insulin by 2 units.  
 - If fasting blood sugar is between , continue current dose - If fasting blood sugar is < 80, decrease insulin by 2 units, and do not increase again. Call clinic --If you increased insulin, take that dose for 3 days then increase as noted above if appropriate. -- 
Your goal fasting blood sugar is . The goal is for all blood sugars to be < 150, however, do not adjust insulin except by fasting blood sguar. Diabetes Blood Sugar Emergencies: Your Action Plan How can you prevent a blood sugar emergency? An important part of living with diabetes is keeping your blood sugar in your target range. You'll need to know what to do if it's too high or too low. Managing your blood sugar levels helps you avoid emergencies. This care sheet will teach you about the signs of high and low blood sugar. It will help you make an action plan with your doctor for when these signs occur. Low blood sugar is more likely to happen if you take certain medicines for diabetes. It can also happen if you skip a meal, drink alcohol, or exercise more than usual. 
You may get high blood sugar if you eat differently than you normally do. One example is eating more carbohydrate than usual. Having a cold, the flu, or other sudden illness can also cause high blood sugar levels. Levels can also rise if you miss a dose of medicine. Any change in how you take your medicine may affect your blood sugar level. So it's important to work with your doctor before you make any changes. Check your blood sugar Work with your doctor to fill in the blank spaces below that apply to you. Track your levels, know your target range, and write down ways you can get your blood sugar back in your target range. A log book can help you track your levels. Take the book to all of your medical appointments. Call your doctor when your blood sugar results are ___________________________________. (For example: Less than 70 or above 250 mg/dL.) What are the symptoms of low and high blood sugar? Common symptoms of low blood sugar are sweating and feeling shaky, weak, hungry, or confused. Symptoms can start quickly. Common symptoms of high blood sugar are feeling very thirsty or very hungry. You may also pass urine more often than usual. You may have blurry vision and may lose weight without trying. But some people may have high or low blood sugar without having any symptoms. That's a good reason to check your blood sugar on a regular schedule. What should you do if you have symptoms? Work with your doctor to fill in the blank spaces below that apply to you. Low blood sugar If you have symptoms of low blood sugar, check your blood sugar. If it's below _80____ (for example, below 70), eat or drink a quick-sugar food that has about 15 grams of carbohydrate. Your goal is to get your level back to your safe range. Check your blood sugar again 15 minutes later. If it's still not in your target range, take another 15 grams of carbohydrate and check your blood sugar again in 15 minutes. Repeat this until you reach your target. Then go back to your regular testing schedule. When you have low blood sugar, it's best to stop or reduce any physical activity until your blood sugar is back in your target range and is stable. If you must stay active, eat or drink 30 grams of carbohydrate. Then check your blood sugar again in 15 minutes. If it's not in your target range, take another 30 grams of carbohydrates. Check your blood sugar again in 15 minutes. Keep doing this until you reach your target. You can then go back to your regular testing schedule. If your symptoms or blood sugar levels are getting worse or have not improved after 15 minutes, seek medical care right away. Here are some examples of quick-sugar foods with 15 grams of carbohydrate: · 3 or 4 glucose tablets · 1 tube of glucose gel · Hard candy (such as 3 Jolly Ranchers or 5 to [employer]) · ½ cup to ¾ cup (4 to 6 ounces) of fruit juice or regular (not diet) soda High blood sugar If you have symptoms of high blood sugar, check your blood sugar.  Your goal is to get your level back to your target range. If it's above __250____ (for example, above 250), follow these steps: · If you missed a dose of your diabetes medicine, take it now. Take only the amount of medicine that you have been prescribed. Do not take more or less medicine. · Give yourself insulin if your doctor has prescribed it for high blood sugar. · Test for ketones, if the doctor told you to do so. If the results of the ketone test show a moderate-to-large amount of ketones, call the doctor for advice. · Wait 30 minutes after you take the extra insulin or the missed medicine. Check your blood sugar again. If your symptoms or blood sugar levels are getting worse or have not improved after taking these steps, seek medical care right away. Follow-up care is a key part of your treatment and safety. Be sure to make and go to all appointments, and call your doctor if you are having problems. It's also a good idea to know your test results and keep a list of the medicines you take. Where can you learn more? Go to http://justice-eladio.info/. Enter N868 in the search box to learn more about \"Diabetes Blood Sugar Emergencies: Your Action Plan. \" Current as of: March 13, 2017 Content Version: 11.3 © 4400-7518 [employer]. Care instructions adapted under license by [employer] (which disclaims liability or warranty for this information). If you have questions about a medical condition or this instruction, always ask your healthcare professional. [address] any warranty or liability for your use of this information. Introducing [facility]! Darby Ford introduces [employer] patient portal. Now you can access parts of your medical record, email your doctor's office, and request medication refills online. 1. In your internet browser, go to [email or web address] 2. Click on the First Time User? Click Here link in the Sign In box. You will see the New Member Sign Up page. 3. Enter your [employer] Access Code exactly as it appears below. You will not need to use this code after youve completed the sign-up process. If you do not sign up before the expiration date, you must request a new code. · [employer] Access Code: [email or web address] Expires: 1/23/2018 11:00 AM 
 
4. Enter the last four digits of your Social Security Number (xxxx) and Date of Birth (mm/dd/yyyy) as indicated and click Submit. You will be taken to the next sign-up page. 5. Create a [employer] ID. This will be your [employer] login ID and cannot be changed, so think of one that is secure and easy to remember. 6. Create a [employer] password. You can change your password at any time. 7. Enter your Password Reset Question and Answer. This can be used at a later time if you forget your password. 8. Enter your e-mail address. You will receive e-mail notification when new information is available in [address] 9. Click Sign Up. You can now view and download portions of your medical record. 10. Click the Download Summary menu link to download a portable copy of your medical information. If you have questions, please visit the Frequently Asked Questions section of the [employer] website. Remember, [employer] is NOT to be used for urgent needs. For medical emergencies, dial 911. Now available from your iPhone and Android! Please provide this summary of care documentation to your next provider. Your primary care clinician is listed as Ernestina Silvestre. If you have any questions after today's visit, please call [phone].

## 2017-10-25 NOTE — PROGRESS NOTES
Rm#3  Non-fasting   Chief Complaint   Patient presents with    Diabetes    Hypertension     1. Have you been to the ER, urgent care clinic since your last visit? Hospitalized since your last visit? No    2. Have you seen or consulted any other health care providers outside of the [address] [place] [address] since your last visit? Include any pap smears or colon screening. No   Dr. Andie Chapman- kidney doctor- no change   Health Maintenance Due   Topic Date Due    LIPID PANEL Q1  1945    EYE EXAM RETINAL OR DILATED Q1  07/18/1953    DTaP/Tdap/Td series (1 - Tdap) 07/18/1964    FOBT Q 1 YEAR AGE 50-75  07/18/1993    ZOSTER VACCINE AGE 60>  05/18/2003    GLAUCOMA SCREENING Q2Y  07/18/2008    INFLUENZA AGE 9 TO ADULT  08/01/2017     Pt wants flu vaccine   Wants zoster vacc  Hm reviewed   PHQ over the last two weeks 10/25/2017   Little interest or pleasure in doing things Not at all   Feeling down, depressed or hopeless Not at all   Total Score PHQ 2 0     Fall Risk Assessment, last 12 mths 10/25/2017   Able to walk? Yes   Fall in past 12 months? Yes   Fall with injury?  No   Number of falls in past 12 months 1   Fall Risk Score 1

## 2017-10-25 NOTE — PATIENT INSTRUCTIONS
Plan for  long acting insulin, with patient managed dose adjustment. Blood Sugar log book: Please keep a log of your blood sugar levels and insulin administration as follows. Your insurance will pay for 3 times a day testing now that you are on insulin. Please ольга if you need additional prescriptions. - Morning fasting blood sugar (before eating breakfast)   - Before lunch or before dinner blood sugar.   - Before bed blood sugar    Insulin type: glargine  Starting dose: 22 units, administer nightly. You will adjust your insulin dose according to the fasting blood sugar only (this is the before breakfast blood sugar). After 3 days:    - If fasting blood sugar is > 150 each day, increase insulin by 2 units.    - If fasting blood sugar is between , continue current dose   - If fasting blood sugar is < 80, decrease insulin by 2 units, and do not increase again. Call clinic  --If you increased insulin, take that dose for 3 days then increase as noted above if appropriate. --  Your goal fasting blood sugar is . The goal is for all blood sugars to be < 150, however, do not adjust insulin except by fasting blood sguar. Diabetes Blood Sugar Emergencies: Your Action Plan  How can you prevent a blood sugar emergency? An important part of living with diabetes is keeping your blood sugar in your target range. You'll need to know what to do if it's too high or too low. Managing your blood sugar levels helps you avoid emergencies. This care sheet will teach you about the signs of high and low blood sugar. It will help you make an action plan with your doctor for when these signs occur. Low blood sugar is more likely to happen if you take certain medicines for diabetes. It can also happen if you skip a meal, drink alcohol, or exercise more than usual.  You may get high blood sugar if you eat differently than you normally do.  One example is eating more carbohydrate than usual. Having a cold, the flu, or other sudden illness can also cause high blood sugar levels. Levels can also rise if you miss a dose of medicine. Any change in how you take your medicine may affect your blood sugar level. So it's important to work with your doctor before you make any changes. Check your blood sugar  Work with your doctor to fill in the blank spaces below that apply to you. Track your levels, know your target range, and write down ways you can get your blood sugar back in your target range. A log book can help you track your levels. Take the book to all of your medical appointments. Call your doctor when your blood sugar results are ___________________________________. (For example: Less than 70 or above 250 mg/dL.)  What are the symptoms of low and high blood sugar? Common symptoms of low blood sugar are sweating and feeling shaky, weak, hungry, or confused. Symptoms can start quickly. Common symptoms of high blood sugar are feeling very thirsty or very hungry. You may also pass urine more often than usual. You may have blurry vision and may lose weight without trying. But some people may have high or low blood sugar without having any symptoms. That's a good reason to check your blood sugar on a regular schedule. What should you do if you have symptoms? Work with your doctor to fill in the blank spaces below that apply to you. Low blood sugar  If you have symptoms of low blood sugar, check your blood sugar. If it's below _80____ (for example, below 70), eat or drink a quick-sugar food that has about 15 grams of carbohydrate. Your goal is to get your level back to your safe range. Check your blood sugar again 15 minutes later. If it's still not in your target range, take another 15 grams of carbohydrate and check your blood sugar again in 15 minutes. Repeat this until you reach your target. Then go back to your regular testing schedule.   When you have low blood sugar, it's best to stop or reduce any physical activity until your blood sugar is back in your target range and is stable. If you must stay active, eat or drink 30 grams of carbohydrate. Then check your blood sugar again in 15 minutes. If it's not in your target range, take another 30 grams of carbohydrates. Check your blood sugar again in 15 minutes. Keep doing this until you reach your target. You can then go back to your regular testing schedule. If your symptoms or blood sugar levels are getting worse or have not improved after 15 minutes, seek medical care right away. Here are some examples of quick-sugar foods with 15 grams of carbohydrate:  · 3 or 4 glucose tablets  · 1 tube of glucose gel  · Hard candy (such as 3 Jolly Ranchers or 5 to 7 Life Savers)  · ½ cup to ¾ cup (4 to 6 ounces) of fruit juice or regular (not diet) soda  High blood sugar  If you have symptoms of high blood sugar, check your blood sugar. Your goal is to get your level back to your target range. If it's above __250____ (for example, above 250), follow these steps:  · If you missed a dose of your diabetes medicine, take it now. Take only the amount of medicine that you have been prescribed. Do not take more or less medicine. · Give yourself insulin if your doctor has prescribed it for high blood sugar. · Test for ketones, if the doctor told you to do so. If the results of the ketone test show a moderate-to-large amount of ketones, call the doctor for advice. · Wait 30 minutes after you take the extra insulin or the missed medicine. Check your blood sugar again. If your symptoms or blood sugar levels are getting worse or have not improved after taking these steps, seek medical care right away. Follow-up care is a key part of your treatment and safety. Be sure to make and go to all appointments, and call your doctor if you are having problems. It's also a good idea to know your test results and keep a list of the medicines you take. Where can you learn more?   Go to http://justice-eladio.info/. Enter M990 in the search box to learn more about \"Diabetes Blood Sugar Emergencies: Your Action Plan. \"  Current as of: March 13, 2017  Content Version: 11.3  © 5585-4462 [employer]. Care instructions adapted under license by [employer] (which disclaims liability or warranty for this information). If you have questions about a medical condition or this instruction, always ask your healthcare professional. [address] any warranty or liability for your use of this information.

## 2017-10-26 LAB
25(OH)D3+25(OH)D2 SERPL-MCNC: 42.8 NG/ML (ref 30–100)
BUN SERPL-MCNC: 40 MG/DL (ref 8–27)
BUN/CREAT SERPL: 22 (ref 12–28)
CALCIUM SERPL-MCNC: 9.5 MG/DL (ref 8.7–10.3)
CHLORIDE SERPL-SCNC: 105 MMOL/L (ref 96–106)
CO2 SERPL-SCNC: 23 MMOL/L (ref 18–29)
CREAT SERPL-MCNC: 1.79 MG/DL (ref 0.57–1)
GLUCOSE SERPL-MCNC: 125 MG/DL (ref 65–99)
POTASSIUM SERPL-SCNC: 4.2 MMOL/L (ref 3.5–5.2)
SODIUM SERPL-SCNC: 143 MMOL/L (ref 134–144)

## 2018-01-08 ENCOUNTER — DOCUMENTATION ONLY (OUTPATIENT)
Dept: INTERNAL MEDICINE CLINIC | Age: 73
End: 2018-01-08

## 2018-02-09 DIAGNOSIS — M81.0 AGE-RELATED OSTEOPOROSIS WITHOUT CURRENT PATHOLOGICAL FRACTURE: ICD-10-CM

## 2018-02-09 DIAGNOSIS — E55.9 VITAMIN D DEFICIENCY: ICD-10-CM

## 2018-02-13 DIAGNOSIS — I10 ESSENTIAL HYPERTENSION: ICD-10-CM

## 2018-02-13 DIAGNOSIS — M81.0 AGE-RELATED OSTEOPOROSIS WITHOUT CURRENT PATHOLOGICAL FRACTURE: ICD-10-CM

## 2018-02-13 DIAGNOSIS — E55.9 VITAMIN D DEFICIENCY: ICD-10-CM

## 2018-02-13 RX ORDER — CHOLECALCIFEROL (VITAMIN D3) 125 MCG
1 TABLET ORAL DAILY
Qty: 90 TAB | Refills: 4 | Status: SHIPPED | OUTPATIENT
Start: 2018-02-13 | End: 2018-02-21 | Stop reason: SDUPTHER

## 2018-02-14 NOTE — TELEPHONE ENCOUNTER
Please advise to have BP medications filled with nephrologist Dr. Cheryl Herrera.      Thanks  Shruti Hoyos MD

## 2018-02-21 RX ORDER — CHOLECALCIFEROL (VITAMIN D3) 125 MCG
1 TABLET ORAL DAILY
Qty: 90 TAB | Refills: 4 | Status: SHIPPED | OUTPATIENT
Start: 2018-02-21 | End: 2018-03-12 | Stop reason: SDUPTHER

## 2018-02-21 RX ORDER — CLONIDINE HYDROCHLORIDE 0.1 MG/1
TABLET ORAL
Qty: 180 TAB | Refills: 1 | OUTPATIENT
Start: 2018-02-21

## 2018-02-21 NOTE — TELEPHONE ENCOUNTER
Dr. True Whitley Refill  Received: Today       Melinda LEIJA [facility] [employer]                     Pt requesting Rx refill for \" Vitamin D3 2000 mg\" [facility] [address] (on file), [phone]. Best contact [phone].

## 2018-02-21 NOTE — TELEPHONE ENCOUNTER
Detailed message left in regards to patient calling Dr. Clark Arial office to have him refill her blood pressure medicine.

## 2018-03-04 DIAGNOSIS — I10 ESSENTIAL HYPERTENSION: ICD-10-CM

## 2018-03-05 RX ORDER — CLONIDINE HYDROCHLORIDE 0.1 MG/1
TABLET ORAL
Qty: 180 TAB | Refills: 1 | Status: SHIPPED | OUTPATIENT
Start: 2018-03-05 | End: 2018-09-04 | Stop reason: SDUPTHER

## 2018-03-12 ENCOUNTER — TELEPHONE (OUTPATIENT)
Dept: INTERNAL MEDICINE CLINIC | Age: 73
End: 2018-03-12

## 2018-03-12 ENCOUNTER — OFFICE VISIT (OUTPATIENT)
Dept: INTERNAL MEDICINE CLINIC | Age: 73
End: 2018-03-12

## 2018-03-12 ENCOUNTER — DOCUMENTATION ONLY (OUTPATIENT)
Dept: INTERNAL MEDICINE CLINIC | Age: 73
End: 2018-03-12

## 2018-03-12 VITALS
TEMPERATURE: 97.8 F | SYSTOLIC BLOOD PRESSURE: 139 MMHG | RESPIRATION RATE: 18 BRPM | WEIGHT: 164.2 LBS | HEIGHT: 66 IN | HEART RATE: 76 BPM | OXYGEN SATURATION: 100 % | DIASTOLIC BLOOD PRESSURE: 60 MMHG | BODY MASS INDEX: 26.39 KG/M2

## 2018-03-12 DIAGNOSIS — I10 ESSENTIAL HYPERTENSION: ICD-10-CM

## 2018-03-12 DIAGNOSIS — S78.119A UNILATERAL AKA (HCC): ICD-10-CM

## 2018-03-12 DIAGNOSIS — N18.30 STAGE 3 CHRONIC KIDNEY DISEASE (HCC): ICD-10-CM

## 2018-03-12 DIAGNOSIS — E11.42 DIABETIC POLYNEUROPATHY ASSOCIATED WITH TYPE 2 DIABETES MELLITUS (HCC): Primary | ICD-10-CM

## 2018-03-12 DIAGNOSIS — E11.21 DIABETIC NEPHROPATHY ASSOCIATED WITH TYPE 2 DIABETES MELLITUS (HCC): ICD-10-CM

## 2018-03-12 DIAGNOSIS — M81.0 AGE-RELATED OSTEOPOROSIS WITHOUT CURRENT PATHOLOGICAL FRACTURE: ICD-10-CM

## 2018-03-12 DIAGNOSIS — Z12.11 SCREEN FOR COLON CANCER: ICD-10-CM

## 2018-03-12 DIAGNOSIS — E55.9 VITAMIN D DEFICIENCY: ICD-10-CM

## 2018-03-12 DIAGNOSIS — R10.11 RUQ PAIN: ICD-10-CM

## 2018-03-12 LAB — HBA1C MFR BLD HPLC: 8 %

## 2018-03-12 RX ORDER — CHOLECALCIFEROL (VITAMIN D3) 125 MCG
1 TABLET ORAL DAILY
Qty: 90 TAB | Refills: 4 | Status: SHIPPED | OUTPATIENT
Start: 2018-03-12 | End: 2018-03-22 | Stop reason: CLARIF

## 2018-03-12 NOTE — PROGRESS NOTES
FLOWER Zamarripa is a 74 y.o. female, she presents today for:    Has not been taking vit D since October. Diabetes: reports that morning blood sugars are running around 90s to low 80s. Drinking juice every morning. Eating potatoes (baked) ever evening. PMH/PSH: reviewed and updated  Sochx:  reports that she has quit smoking. She quit after 15.00 years of use. She has never used smokeless tobacco. She reports that she does not drink alcohol or use illicit drugs. Famhx: reviewed and updated     All: No Known Allergies  Med:   Current Outpatient Prescriptions   Medication Sig    cloNIDine HCl (CATAPRES) 0.1 mg tablet TAKE 1 TABLET TWICE DAILY    lisinopril (PRINIVIL, ZESTRIL) 20 mg tablet Take 20 mg by mouth daily.  insulin glargine (LANTUS SOLOSTAR) 100 unit/mL (3 mL) inpn 22 Units by SubCUTAneous route daily.  TRUE METRIX GLUCOSE TEST STRIP strip     metoprolol succinate (TOPROL-XL) 50 mg XL tablet Take 1 Tab by mouth daily.  amLODIPine (NORVASC) 5 mg tablet Take 1 Tab by mouth daily.  hydrALAZINE (APRESOLINE) 10 mg tablet Take 1 Tab by mouth three (3) times daily.  furosemide (LASIX) 40 mg tablet Take 1 Tab by mouth daily.  gabapentin (NEURONTIN) 100 mg capsule Take 2 Caps by mouth two (2) times a day.  ergocalciferol, vitamin D2, 2,000 unit tab Take 1 Tab by mouth daily. No current facility-administered medications for this visit. Review of Systems   Constitutional: Negative for chills, fever and malaise/fatigue. Respiratory: Negative for shortness of breath. Cardiovascular: Negative for chest pain. PE:  Blood pressure 160/68, pulse 76, temperature 97.8 °F (36.6 °C), temperature source Oral, resp. rate 18, height 5' 5.5\" (1.664 m), weight 164 lb 3.2 oz (74.5 kg), SpO2 100 %. Body mass index is 26.91 kg/(m^2). Physical Exam   Constitutional: She is oriented to person, place, and time. She appears well-developed and well-nourished. No distress.    HENT: Head: Normocephalic. Mouth/Throat: Oropharynx is clear and moist.   Eyes: Conjunctivae and EOM are normal.   Neck: Neck supple. Cardiovascular: Normal rate, regular rhythm and normal heart sounds. Pulmonary/Chest: Effort normal and breath sounds normal.   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 Skin: Skin is warm and dry. Nursing note and vitals reviewed. Labs:   No results found for any visits on 03/12/18. A/P:  74 y.o. female    ICD-10-CM ICD-9-CM    1. Diabetic polyneuropathy associated with type 2 diabetes mellitus (HCC) E11.42 250.60 AMB POC HEMOGLOBIN A1C     357.2      DMII: A1C goal < 7.5. Above goal today with A1C 8.0. On basal insulin. Not a candidate for metformin d/t CKD. - decreased basal insulin at last visit for low blood sugars, however continued to drink juice and eat baked potatoes. - discussed importance of dietary control with insulin. Next visit if above goal will add in short acting insulin.      HTN/CKD: BP at goal. Needs to follow-up with Dr. Leonides Steven     Osteoporosis: Nephrologist Dr. Leonides Steven following for 2nd hyperP. Not a candidate for bisphosphonate.      Low Vitamin D: improved with gentle repletion. In range 10/25/17 with 42    RUQ pain: resolved, after 2 days of pain with breathing, suspicious for GB/liver inflammation. CMP requested. - She was given AVS and expressed understanding with the diagnosis and plan as discussed. Follow-up Disposition:  Return in about 4 months (around 7/1/2018) for medicare wellness.

## 2018-03-12 NOTE — TELEPHONE ENCOUNTER
Left message on patient's voicemail for patient to return to office to  Occult Blood Fit test sample kit. Left office number if any questions.

## 2018-03-12 NOTE — PATIENT INSTRUCTIONS
Stop drinking juice and eating baked potato. Your A1C today was high at 8.0. Indicating your average blood glucose was elevated. The next step would be to add in short acting insulin with each meal. Lets wait another 3 months to see if dietary change is enough to improve average blood sugar. If you are measuring blood sugars over 150, please let me know as me may want to add in this short acting insulin earlier. Call Dr. Ramona Cortez for follow-up. We are drawing kidney labs today. Please be sure to schedule an eye doctors appointment. Please complete stool test for colon cancer risk stratification. Colon Cancer Screening: Care Instructions  Your Care Instructions    Colorectal cancer occurs in the colon or rectum. That's the lower part of your digestive system. It is the second-leading cause of cancer deaths in the [place]. It often starts with small growths called polyps in the colon or rectum. Polyps are usually found with screening tests. Depending on the type of test, any polyps found may be removed during the tests. Colorectal cancer usually does not cause symptoms at first. But regular tests can help find it early, before it spreads and becomes harder to treat. Experts advise routine tests for colon cancer for people starting at age 48. And they advise people with a higher risk of colon cancer to get tested sooner. Talk with your doctor about when you should start testing. Discuss which tests you need. Follow-up care is a key part of your treatment and safety. Be sure to make and go to all appointments, and call your doctor if you are having problems. It's also a good idea to know your test results and keep a list of the medicines you take. What are the main screening tests for colon cancer? · Stool tests. These include the fecal immunochemical test (FIT) and the fecal occult blood test (FOBT). These tests check stool samples for signs of cancer.  If your test is positive, you will need to have a colonoscopy. · Sigmoidoscopy. This test lets your doctor look at the lining of your rectum and the lowest part of your colon. Your doctor uses a lighted tube called a sigmoidoscope. This test can't find cancers or polyps in the upper part of your colon. In some cases, polyps that are found can be removed. But if your doctor finds polyps, you will need to have a colonoscopy to check the upper part of your colon. · Colonoscopy. This test lets your doctor look at the lining of your rectum and your entire colon. The doctor uses a thin, flexible tool called a colonoscope. It can also be used to remove polyps or get a tissue sample (biopsy). What tests do you need? The following guidelines are for people age 48 and over who are not at high risk for colorectal cancer. You may have at least one of these tests as directed by your doctor. · Fecal immunochemical test (FIT) or fecal occult blood test (FOBT) every year  · Sigmoidoscopy every 5 years  · Colonoscopy every 10 years  If you are age 68 to 80, you can work with your doctor to decide if screening is a good option. If you are age 80 or older, your doctor will likely advise that screening is not helpful. Talk with your doctor about when you need to be tested. And discuss which tests are right for you. Your doctor may recommend earlier or more frequent testing if you:  · Have had colorectal cancer before. · Have had colon polyps. · Have symptoms of colorectal cancer. These include blood in your stool and changes in your bowel habits. · Have a parent, brother or sister, or child with colon polyps or colorectal cancer. · Have a bowel disease. This includes ulcerative colitis and Crohn's disease. · Have a rare polyp syndrome that runs in families, such as familial adenomatous polyposis (FAP). · Have had radiation treatments to the belly or pelvis. When should you call for help?   Watch closely for changes in your health, and be sure to contact your doctor if:  ? · You have any changes in your bowel habits. ? · You have any problems. Where can you learn more? Go to http://justice-eladio.info/. Enter M541 in the search box to learn more about \"Colon Cancer Screening: Care Instructions. \"  Current as of: May 12, 2017  Content Version: 11.4  © 4505-0778 [employer]. Care instructions adapted under license by [employer] (which disclaims liability or warranty for this information). If you have questions about a medical condition or this instruction, always ask your healthcare professional. [address] any warranty or liability for your use of this information.

## 2018-03-12 NOTE — PROGRESS NOTES
Anson Ram is a 74 y.o. female  Chief Complaint   Patient presents with    Well Woman     1. Have you been to the ER, urgent care clinic since your last visit? Hospitalized since your last visit? No    2. Have you seen or consulted any other health care providers outside of the [address] [place] [address] since your last visit? Include any pap smears or colon screening.  No       Health Maintenance Due   Topic Date Due    LIPID PANEL Q1  1945    EYE EXAM RETINAL OR DILATED Q1  07/18/1953    DTaP/Tdap/Td series (1 - Tdap) 07/18/1964    FOBT Q 1 YEAR AGE 50-75  07/18/1993    ZOSTER VACCINE AGE 60>  05/18/2003    GLAUCOMA SCREENING Q2Y  07/18/2008

## 2018-03-12 NOTE — TELEPHONE ENCOUNTER
Spoke with patient, verified name and  Advised her that the Occult Blood Sample Kit is  office in here in office waiting for her to . She stated that her son will come to office to  probably Friday.

## 2018-03-12 NOTE — MR AVS SNAPSHOT
[address] [place] [address] [address] [place] [address] 
[phone] Patient: John Mendoza MRN: [id number] EBD:5/00/3115 Visit Information Date & Time Provider Department Dept. Phone Encounter #  
 3/12/2018  9:30 AM Solomon Shelton MD [facility] Internal Medicine [phone] Follow-up Instructions Return in about 4 months (around 7/1/2018) for medicare wellness. Upcoming Health Maintenance Date Due  
 LIPID PANEL Q1 1945 EYE EXAM RETINAL OR DILATED Q1 7/18/1953 DTaP/Tdap/Td series (1 - Tdap) 7/18/1964 FOBT Q 1 YEAR AGE 50-75 7/18/1993 ZOSTER VACCINE AGE 60> 5/18/2003 GLAUCOMA SCREENING Q2Y 7/18/2008 HEMOGLOBIN A1C Q6M 4/25/2018 MICROALBUMIN Q1 6/20/2018 Pneumococcal 65+ Low/Medium Risk (2 of 2 - PPSV23) 7/25/2018 FOOT EXAM Q1 7/25/2018 MEDICARE YEARLY EXAM 7/26/2018 BREAST CANCER SCRN MAMMOGRAM 8/7/2019 Allergies as of 3/12/2018  Review Complete On: 3/12/2018 By: Solomon Shelton MD  
 No Known Allergies Current Immunizations  Reviewed on 10/25/2017 Name Date Influenza High Dose Vaccine PF 10/25/2017 12:11 PM  
 Pneumococcal Conjugate (PCV-13) 7/25/2017 11:00 AM  
  
 Not reviewed this visit You Were Diagnosed With   
  
 Codes Comments Diabetic polyneuropathy associated with type 2 diabetes mellitus ([facility]    -  Primary ICD-10-CM: E11.42 
ICD-9-CM: 250.60, 357.2 Diabetic nephropathy associated with type 2 diabetes mellitus ([facility]     ICD-10-CM: E11.21 
ICD-9-CM: 250.40, 583.81 Stage 3 chronic kidney disease     ICD-10-CM: N18.3 ICD-9-CM: 439. 3 Unilateral AKA ([facility]     ICD-10-CM: H99.791 ICD-9-CM: V49.76 Uncontrolled type 2 diabetes mellitus with other circulatory complication, with long-term current use of insulin (HCC)     ICD-10-CM: E11.59, Z79.4, E11.65 ICD-9-CM: 250.72, V58.67  Essential hypertension     ICD-10-CM: I10 
 ICD-9-CM: 401.9 RUQ pain     ICD-10-CM: R10.11 ICD-9-CM: 789.01 Vitamin D deficiency     ICD-10-CM: E55.9 ICD-9-CM: 268.9 Age-related osteoporosis without current pathological fracture     ICD-10-CM: M81.0 ICD-9-CM: 733.01 Screen for colon cancer     ICD-10-CM: Z12.11 ICD-9-CM: V76.51 Vitals BP Pulse Temp Resp Height(growth percentile) Weight(growth percentile) 139/60 76 97.8 °F (36.6 °C) (Oral) 18 5' 5.5\" (1.664 m) 164 lb 3.2 oz (74.5 kg) LMP SpO2 BMI OB Status Smoking Status (LMP Unknown) 100% 26.91 kg/m2 Hysterectomy Former Smoker Vitals History BMI and BSA Data Body Mass Index Body Surface Area  
 26.91 kg/m 2 1.86 m 2 Preferred Pharmacy Pharmacy Name Phone [address] [phone] Your Updated Medication List  
  
   
This list is accurate as of 3/12/18 10:05 AM.  Always use your most recent med list. amLODIPine 5 mg tablet Commonly known as:  Helen Peach Take 1 Tab by mouth daily. cloNIDine HCl 0.1 mg tablet Commonly known as:  CATAPRES  
TAKE 1 TABLET TWICE DAILY  
  
 ergocalciferol (vitamin D2) 2,000 unit Tab Take 1 Tab by mouth daily. furosemide 40 mg tablet Commonly known as:  LASIX Take 1 Tab by mouth daily. gabapentin 100 mg capsule Commonly known as:  NEURONTIN Take 2 Caps by mouth two (2) times a day. hydrALAZINE 10 mg tablet Commonly known as:  APRESOLINE Take 1 Tab by mouth three (3) times daily. insulin glargine 100 unit/mL (3 mL) Inpn Commonly known as:  LANTUS SOLOSTAR U-100 INSULIN  
22 Units by SubCUTAneous route daily. lisinopril 20 mg tablet Commonly known as:  Romana Mckinnon Take 20 mg by mouth daily. metoprolol succinate 50 mg XL tablet Commonly known as:  TOPROL-XL Take 1 Tab by mouth daily. TRUE METRIX GLUCOSE TEST STRIP strip Generic drug:  glucose blood VI test strips Prescriptions Sent to Pharmacy Refills  
 ergocalciferol, vitamin D2, 2,000 unit tab 4 Sig: Take 1 Tab by mouth daily. Class: Normal  
 Pharmacy: [address] [place] [address], [address] [place], [place] [address]Th [facility] #: [phone] Route: Oral  
  
We Performed the Following AMB POC HEMOGLOBIN A1C [74099 CPT(R)] MAGNESIUM [id number] CPT(R)] METABOLIC PANEL, COMPREHENSIVE [78660 CPT(R)] OCCULT BLOOD, IMMUNOASSAY (FIT) [id number] CPT(R)] VITAMIN D, 25 HYDROXY [id number] CPT(R)] Follow-up Instructions Return in about 4 months (around 7/1/2018) for medicare wellness. Patient Instructions Stop drinking juice and eating baked potato. Your A1C today was high at 8.0. Indicating your average blood glucose was elevated. The next step would be to add in short acting insulin with each meal. Lets wait another 3 months to see if dietary change is enough to improve average blood sugar. If you are measuring blood sugars over 150, please let me know as me may want to add in this short acting insulin earlier. Call Dr. Almaz Gutierrez for follow-up. We are drawing kidney labs today. Please be sure to schedule an eye doctors appointment. Please complete stool test for colon cancer risk stratification. Colon Cancer Screening: Care Instructions Your Care Instructions Colorectal cancer occurs in the colon or rectum. That's the lower part of your digestive system. It is the second-leading cause of cancer deaths in the [place]. It often starts with small growths called polyps in the colon or rectum. Polyps are usually found with screening tests. Depending on the type of test, any polyps found may be removed during the tests.  
Colorectal cancer usually does not cause symptoms at first. But regular tests can help find it early, before it spreads and becomes harder to treat. Experts advise routine tests for colon cancer for people starting at age 48. And they advise people with a higher risk of colon cancer to get tested sooner. Talk with your doctor about when you should start testing. Discuss which tests you need. Follow-up care is a key part of your treatment and safety. Be sure to make and go to all appointments, and call your doctor if you are having problems. It's also a good idea to know your test results and keep a list of the medicines you take. What are the main screening tests for colon cancer? · Stool tests. These include the fecal immunochemical test (FIT) and the fecal occult blood test (FOBT). These tests check stool samples for signs of cancer. If your test is positive, you will need to have a colonoscopy. · Sigmoidoscopy. This test lets your doctor look at the lining of your rectum and the lowest part of your colon. Your doctor uses a lighted tube called a sigmoidoscope. This test can't find cancers or polyps in the upper part of your colon. In some cases, polyps that are found can be removed. But if your doctor finds polyps, you will need to have a colonoscopy to check the upper part of your colon. · Colonoscopy. This test lets your doctor look at the lining of your rectum and your entire colon. The doctor uses a thin, flexible tool called a colonoscope. It can also be used to remove polyps or get a tissue sample (biopsy). What tests do you need? The following guidelines are for people age 48 and over who are not at high risk for colorectal cancer. You may have at least one of these tests as directed by your doctor. · Fecal immunochemical test (FIT) or fecal occult blood test (FOBT) every year · Sigmoidoscopy every 5 years · Colonoscopy every 10 years If you are age 68 to 80, you can work with your doctor to decide if screening is a good option. If you are age 80 or older, your doctor will likely advise that screening is not helpful. Talk with your doctor about when you need to be tested. And discuss which tests are right for you. Your doctor may recommend earlier or more frequent testing if you: 
· Have had colorectal cancer before. · Have had colon polyps. · Have symptoms of colorectal cancer. These include blood in your stool and changes in your bowel habits. · Have a parent, brother or sister, or child with colon polyps or colorectal cancer. · Have a bowel disease. This includes ulcerative colitis and Crohn's disease. · Have a rare polyp syndrome that runs in families, such as familial adenomatous polyposis (FAP). · Have had radiation treatments to the belly or pelvis. When should you call for help? Watch closely for changes in your health, and be sure to contact your doctor if: 
? · You have any changes in your bowel habits. ? · You have any problems. Where can you learn more? Go to http://justice-eladio.info/. Enter M541 in the search box to learn more about \"Colon Cancer Screening: Care Instructions. \" Current as of: May 12, 2017 Content Version: 11.4 © 6085-7749 [employer]. Care instructions adapted under license by [employer] (which disclaims liability or warranty for this information). If you have questions about a medical condition or this instruction, always ask your healthcare professional. [address] any warranty or liability for your use of this information. Introducing [facility]! Fritz Ni introduces [employer] patient portal. Now you can access parts of your medical record, email your doctor's office, and request medication refills online. 1. In your internet browser, go to [email or web address] 2. Click on the First Time User? Click Here link in the Sign In box. You will see the New Member Sign Up page. 3. Enter your [employer] Access Code exactly as it appears below.  You will not need to use this code after youve completed the sign-up process. If you do not sign up before the expiration date, you must request a new code. · Searchspace Access Code: [email or web address] Expires: 6/10/2018  9:33 AM 
 
4. Enter the last four digits of your Social Security Number (xxxx) and Date of Birth (mm/dd/yyyy) as indicated and click Submit. You will be taken to the next sign-up page. 5. Create a Searchspace ID. This will be your Searchspace login ID and cannot be changed, so think of one that is secure and easy to remember. 6. Create a Searchspace password. You can change your password at any time. 7. Enter your Password Reset Question and Answer. This can be used at a later time if you forget your password. 8. Enter your e-mail address. You will receive e-mail notification when new information is available in [address] 9. Click Sign Up. You can now view and download portions of your medical record. 10. Click the Download Summary menu link to download a portable copy of your medical information. If you have questions, please visit the Frequently Asked Questions section of the Searchspace website. Remember, Searchspace is NOT to be used for urgent needs. For medical emergencies, dial 911. Now available from your iPhone and Android! Please provide this summary of care documentation to your next provider. Your primary care clinician is listed as Floresita Gerardo. If you have any questions after today's visit, please call [phone].

## 2018-03-13 LAB
25(OH)D3+25(OH)D2 SERPL-MCNC: 37.2 NG/ML (ref 30–100)
ALBUMIN SERPL-MCNC: 3.5 G/DL (ref 3.5–4.8)
ALBUMIN/GLOB SERPL: 1.1 {RATIO} (ref 1.2–2.2)
ALP SERPL-CCNC: 97 IU/L (ref 39–117)
ALT SERPL-CCNC: 13 IU/L (ref 0–32)
AST SERPL-CCNC: 12 IU/L (ref 0–40)
BILIRUB SERPL-MCNC: 0.3 MG/DL (ref 0–1.2)
BUN SERPL-MCNC: 43 MG/DL (ref 8–27)
BUN/CREAT SERPL: 21 (ref 12–28)
CALCIUM SERPL-MCNC: 9.3 MG/DL (ref 8.7–10.3)
CHLORIDE SERPL-SCNC: 105 MMOL/L (ref 96–106)
CO2 SERPL-SCNC: 19 MMOL/L (ref 18–29)
CREAT SERPL-MCNC: 2.01 MG/DL (ref 0.57–1)
GLOBULIN SER CALC-MCNC: 3.3 G/DL (ref 1.5–4.5)
GLUCOSE SERPL-MCNC: 191 MG/DL (ref 65–99)
MAGNESIUM SERPL-MCNC: 2 MG/DL (ref 1.6–2.3)
POTASSIUM SERPL-SCNC: 4.5 MMOL/L (ref 3.5–5.2)
PROT SERPL-MCNC: 6.8 G/DL (ref 6–8.5)
SODIUM SERPL-SCNC: 142 MMOL/L (ref 134–144)

## 2018-03-13 NOTE — PROGRESS NOTES
Please advise: labs overall stable. However with decline in diabetes control, kidneys are showing signs of strain. Continue with current insulin dosing, however if not having success with eliminating juice/sugary beverages and white potatos then to let me know so we can start meal dosing insulin (this would be with a new short acting insulin). Should make an appointment. Otherwise will repeat A1C in 3 months and determine if effect of dietary chance sufficent.     Thanks  Amy Leon MD

## 2018-03-22 DIAGNOSIS — E55.9 VITAMIN D DEFICIENCY: ICD-10-CM

## 2018-03-22 DIAGNOSIS — M81.0 AGE-RELATED OSTEOPOROSIS WITHOUT CURRENT PATHOLOGICAL FRACTURE: ICD-10-CM

## 2018-03-22 RX ORDER — ACETAMINOPHEN 500 MG
2000 TABLET ORAL DAILY
Qty: 90 CAP | Refills: 4 | Status: SHIPPED | OUTPATIENT
Start: 2018-03-22 | End: 2018-06-01 | Stop reason: SDUPTHER

## 2018-04-12 NOTE — PROGRESS NOTES
Spoke with patient after verifying name and  regarding Dr. Leonides Willis recommendations. Writer informed patient of Dr. Leonides Willis recommendations. Patient given an opportunity to ask questions, repeated information, and verbalized understanding. Pt states that she is not drinking sugary beverages, she is drinking V8 low sodium, she is not eating white potatoes but she is eating a little pasta. She has an appt with Kidney specialist Dr. Moises Orona May 19. She is schedule to see Dr. Pilar Carr on , states she will keep that appt.

## 2018-05-07 ENCOUNTER — TELEPHONE (OUTPATIENT)
Dept: INTERNAL MEDICINE CLINIC | Age: 73
End: 2018-05-07

## 2018-05-07 DIAGNOSIS — Z79.4 CONTROLLED TYPE 2 DIABETES MELLITUS WITH DIABETIC PERIPHERAL ANGIOPATHY WITHOUT GANGRENE, WITH LONG-TERM CURRENT USE OF INSULIN (HCC): Primary | ICD-10-CM

## 2018-05-07 DIAGNOSIS — E11.51 CONTROLLED TYPE 2 DIABETES MELLITUS WITH DIABETIC PERIPHERAL ANGIOPATHY WITHOUT GANGRENE, WITH LONG-TERM CURRENT USE OF INSULIN (HCC): Primary | ICD-10-CM

## 2018-05-07 NOTE — TELEPHONE ENCOUNTER
Medication refill request:    Last Office Visit:3/12/18  Next Office Visit:  Future Appointments  Date Time Provider Inge Hammi   7/26/2018 10:00 AM Patrizia Clemens MD [address] [place] [address] verified. Yes    Pharmacy requesting a prescription for Prodigy Twist top Lancet's 28 gauge.

## 2018-05-08 RX ORDER — LANCETS 28 GAUGE
EACH MISCELLANEOUS
Qty: 200 LANCET | Refills: 11 | Status: SHIPPED | OUTPATIENT
Start: 2018-05-08 | End: 2018-10-17 | Stop reason: SDUPTHER

## 2018-05-09 DIAGNOSIS — I10 ESSENTIAL HYPERTENSION: ICD-10-CM

## 2018-05-09 DIAGNOSIS — G62.9 NEUROPATHY: ICD-10-CM

## 2018-05-09 RX ORDER — AMLODIPINE BESYLATE 5 MG/1
TABLET ORAL
Qty: 90 TAB | Refills: 3 | Status: SHIPPED | OUTPATIENT
Start: 2018-05-09 | End: 2019-07-04 | Stop reason: SDUPTHER

## 2018-05-09 RX ORDER — HYDRALAZINE HYDROCHLORIDE 10 MG/1
10 TABLET, FILM COATED ORAL 3 TIMES DAILY
Qty: 270 TAB | Refills: 3 | Status: SHIPPED | OUTPATIENT
Start: 2018-05-09 | End: 2019-03-18 | Stop reason: SDUPTHER

## 2018-05-09 RX ORDER — FUROSEMIDE 40 MG/1
40 TABLET ORAL DAILY
Qty: 90 TAB | Refills: 3 | Status: SHIPPED | OUTPATIENT
Start: 2018-05-09 | End: 2019-04-25 | Stop reason: SDUPTHER

## 2018-05-09 RX ORDER — GABAPENTIN 100 MG/1
200 CAPSULE ORAL 2 TIMES DAILY
Qty: 360 CAP | Refills: 3 | Status: SHIPPED | OUTPATIENT
Start: 2018-05-09 | End: 2019-08-15 | Stop reason: SDUPTHER

## 2018-07-26 ENCOUNTER — OFFICE VISIT (OUTPATIENT)
Dept: INTERNAL MEDICINE CLINIC | Age: 73
End: 2018-07-26

## 2018-07-26 VITALS
HEART RATE: 74 BPM | WEIGHT: 168 LBS | DIASTOLIC BLOOD PRESSURE: 70 MMHG | SYSTOLIC BLOOD PRESSURE: 135 MMHG | TEMPERATURE: 98 F | OXYGEN SATURATION: 99 % | BODY MASS INDEX: 27 KG/M2 | HEIGHT: 66 IN | RESPIRATION RATE: 12 BRPM

## 2018-07-26 DIAGNOSIS — E11.51 CONTROLLED TYPE 2 DIABETES MELLITUS WITH DIABETIC PERIPHERAL ANGIOPATHY WITHOUT GANGRENE, WITH LONG-TERM CURRENT USE OF INSULIN (HCC): ICD-10-CM

## 2018-07-26 DIAGNOSIS — Z12.11 SCREEN FOR COLON CANCER: ICD-10-CM

## 2018-07-26 DIAGNOSIS — Z13.220 SCREENING FOR LIPID DISORDERS: ICD-10-CM

## 2018-07-26 DIAGNOSIS — E11.42 DIABETIC POLYNEUROPATHY ASSOCIATED WITH TYPE 2 DIABETES MELLITUS (HCC): ICD-10-CM

## 2018-07-26 DIAGNOSIS — E11.21 DIABETIC NEPHROPATHY ASSOCIATED WITH TYPE 2 DIABETES MELLITUS (HCC): ICD-10-CM

## 2018-07-26 DIAGNOSIS — M81.0 AGE-RELATED OSTEOPOROSIS WITHOUT CURRENT PATHOLOGICAL FRACTURE: ICD-10-CM

## 2018-07-26 DIAGNOSIS — Z00.00 MEDICARE ANNUAL WELLNESS VISIT, SUBSEQUENT: Primary | ICD-10-CM

## 2018-07-26 DIAGNOSIS — Z23 ENCOUNTER FOR IMMUNIZATION: ICD-10-CM

## 2018-07-26 DIAGNOSIS — Z79.4 CONTROLLED TYPE 2 DIABETES MELLITUS WITH DIABETIC PERIPHERAL ANGIOPATHY WITHOUT GANGRENE, WITH LONG-TERM CURRENT USE OF INSULIN (HCC): ICD-10-CM

## 2018-07-26 DIAGNOSIS — S78.112A UNILATERAL AKA, LEFT (HCC): ICD-10-CM

## 2018-07-26 DIAGNOSIS — I10 ESSENTIAL HYPERTENSION: ICD-10-CM

## 2018-07-26 DIAGNOSIS — N18.30 STAGE 3 CHRONIC KIDNEY DISEASE (HCC): ICD-10-CM

## 2018-07-26 DIAGNOSIS — Z12.39 SCREENING FOR BREAST CANCER: ICD-10-CM

## 2018-07-26 DIAGNOSIS — E11.59 CONTROLLED TYPE 2 DIABETES MELLITUS WITH OTHER CIRCULATORY COMPLICATION, WITHOUT LONG-TERM CURRENT USE OF INSULIN (HCC): ICD-10-CM

## 2018-07-26 LAB
ALBUMIN UR QL STRIP: 80 MG/L
CREATININE, URINE POC: 200 MG/DL
HBA1C MFR BLD HPLC: 6.8 %
MICROALBUMIN/CREAT RATIO POC: NORMAL MG/G

## 2018-07-26 RX ORDER — MULTIVITAMIN
TABLET,CHEWABLE ORAL
COMMUNITY
Start: 2018-07-11 | End: 2021-05-26

## 2018-07-26 NOTE — PROGRESS NOTES
Exam room 2  Abram Cooks is a 74 y.o. female   Pt is here with her son, unable to give urine sample at start of rooming process, gave her water and she will try later. Pt says she has no questions or complaints  PHQ over the last two weeks 7/26/2018   Little interest or pleasure in doing things Not at all   Feeling down, depressed, irritable, or hopeless Not at all   Total Score PHQ 2 0     Abuse Screening Questionnaire 7/26/2018   Do you ever feel afraid of your partner? N   Are you in a relationship with someone who physically or mentally threatens you? N   Is it safe for you to go home? Y     ADL Assessment 7/26/2018   Feeding yourself No Help Needed   Getting from bed to chair No Help Needed   Getting dressed No Help Needed   Bathing or showering No Help Needed   Walk across the room (includes cane/walker) No Help Needed   Using the telphone No Help Needed   Taking your medications No Help Needed   Preparing meals No Help Needed   Managing money (expenses/bills) Help Needed   Moderately strenuous housework (laundry) No Help Needed   Shopping for personal items (toiletries/medicines) Help Needed   Shopping for groceries Help Needed   Driving Help Needed   Climbing a flight of stairs Help Needed   Getting to places beyond walking distances No Help Needed     Faculty or Preceptor Review of Student Work      Fall Risk Assessment, last 12 mths 7/26/2018   Able to walk? Yes   Fall in past 12 months? No   Fall with injury? -   Number of falls in past 12 months -   Fall Risk Score -                   Visit Vitals    /61 (BP 1 Location: Left arm, BP Patient Position: Sitting)    Pulse 74    Temp 98 °F (36.7 °C) (Oral)    Resp 12    Ht 5' 5.5\" (1.664 m)    Wt 168 lb (76.2 kg)    SpO2 99%    BMI 27.53 kg/m2       Chief Complaint   Patient presents with   [facility] Annual Wellness Visit     1. Have you been to the ER, urgent care clinic since your last visit? Hospitalized since your last visit? No    2.  Have you seen or consulted any other health care providers outside of the [address] [place] [address] since your last visit? Include any pap smears or colon screening.  No  Health Maintenance Due   Topic Date Due    LIPID PANEL Q1  1945    EYE EXAM RETINAL OR DILATED Q1  07/18/1953    DTaP/Tdap/Td series (1 - Tdap) 07/18/1964    FOBT Q 1 YEAR AGE 50-75  07/18/1993    ZOSTER VACCINE AGE 60>  05/18/2003    GLAUCOMA SCREENING Q2Y  07/18/2008    MICROALBUMIN Q1  06/20/2018    Pneumococcal 65+ Low/Medium Risk (2 of 2 - PPSV23) 07/25/2018    FOOT EXAM Q1  07/25/2018    MEDICARE YEARLY EXAM  07/26/2018

## 2018-07-26 NOTE — ACP (ADVANCE CARE PLANNING)
Spoke with patient and son Alejo Martini in clinic today. Also has son Ross Rice, lives in [place]. Does not have living will. Is interested in learning more. REferral information for Elizabeth Ge LPN provided.    Catracho Dewey MD

## 2018-07-26 NOTE — PROGRESS NOTES
This is the Subsequent Medicare Annual Wellness Exam, performed 12 months or more after the Initial AWV or the last Subsequent AWV    I have reviewed the patient's medical history in detail and updated the computerized patient record. History     Past Medical History:   Diagnosis Date    Diabetes ([address]     Hypertension       Past Surgical History:   Procedure Laterality Date    HX ABOVE KNEE AMPUTATION  02/06/2017    HX BELOW KNEE AMPUTATION      HX GYN      hysterectomy     Current Outpatient Prescriptions   Medication Sig Dispense Refill    ALCOHOL PREP PADS padm       gum/pva/dental adhesive (SUPER DENTURE ADHESIVE DT)       VITAMIN D3 2,000 unit tab TK 1 T PO QD  4    amLODIPine (NORVASC) 5 mg tablet TAKE 1 TABLET EVERY DAY 90 Tab 3    hydrALAZINE (APRESOLINE) 10 mg tablet Take 1 Tab by mouth three (3) times daily. 270 Tab 3    furosemide (LASIX) 40 mg tablet Take 1 Tab by mouth daily. 90 Tab 3    gabapentin (NEURONTIN) 100 mg capsule Take 2 Caps by mouth two (2) times a day. 360 Cap 3    lancets (PRODIGY TWIST TOP LANCET) 28 gauge misc To measure blood sugar 3-4 times daily. Dx code E 11.51, Z79.4 200 Lancet 11    cloNIDine HCl (CATAPRES) 0.1 mg tablet TAKE 1 TABLET TWICE DAILY 180 Tab 1    lisinopril (PRINIVIL, ZESTRIL) 20 mg tablet Take 20 mg by mouth daily.  insulin glargine (LANTUS SOLOSTAR) 100 unit/mL (3 mL) inpn 22 Units by SubCUTAneous route daily. 5 Pen 5    TRUE METRIX GLUCOSE TEST STRIP strip       metoprolol succinate (TOPROL-XL) 50 mg XL tablet Take 1 Tab by mouth daily.  80 Tab 3     No Known Allergies  Family History   Problem Relation Age of Onset    Diabetes Mother     Diabetes Father     Heart Disease Father     Diabetes Brother     Diabetes Brother     Diabetes Brother      Social History   Substance Use Topics    Smoking status: Former Smoker     Years: 15.00    Smokeless tobacco: Never Used    Alcohol use No     Patient Active Problem List   Diagnosis Code    Essential hypertension I10    Controlled type 2 diabetes mellitus with circulatory disorder ([facility] E11.59    Diabetic polyneuropathy associated with type 2 diabetes mellitus ([facility] E11.42    Unilateral AKA ([facility] Z89.619    CKD (chronic kidney disease) N18.9    Diabetic nephropathy associated with type 2 diabetes mellitus ([facility] E11.21    Age-related osteoporosis without current pathological fracture M81.0       Depression Risk Factor Screening:     PHQ over the last two weeks 7/26/2018   Little interest or pleasure in doing things Not at all   Feeling down, depressed, irritable, or hopeless Not at all   Total Score PHQ 2 0     Alcohol Risk Factor Screening: You do not drink alcohol or very rarely. Functional Ability and Level of Safety:   Hearing Loss  Hearing is good. Activities of Daily Living  The home contains: handrails  Patient needs help with:  transportation, shopping, preparing meals and walking  Uses chair and walker. Feels she is getting a little stronger after completing exercise class. Plans to continue to do exercises. Fall Risk  Fall Risk Assessment, last 12 mths 7/26/2018   Able to walk? Yes   Fall in past 12 months? No   Fall with injury? -   Number of falls in past 12 months -   Fall Risk Score -     Abuse Screen  Patient is not abused  Living and senior developmental. Naz Sanam being there. Has help with food preparation. Stopped meals on wheels.    Cognitive Screening   Evaluation of Cognitive Function:  Has your family/caregiver stated any concerns about your memory: no      Patient Care Team   Patient Care Team:  Pollo Glass MD as PCP - General (Internal Medicine)  Dao Raza MD (Surgery)  Jonelle Gunn MD (Nephrology)    Assessment/Plan   Education and counseling provided:  Are appropriate based on today's review and evaluation  End-of-Life planning (with patient's consent)  Pneumococcal Vaccine  Diabetes screening test    Diagnoses and all orders for this visit:    1. Medicare annual wellness visit, subsequent    2. Encounter for immunization  -     Pneumococcal Admin ()  -     Pneumococcal Polysaccharide Vaccine    Well woman (non-gyn) exam: history and exam revealed issues as noted below. Cancer screening:   - breast cancer: mammo normal 7/25/2017. Discussed if desires to continue today   - colon cancer: discussed getting c-scope last year, not completed. FIT test provided in march 2018   - hysterectomy no need for cervical cancer screening. Non-smoker stopped more than 40 years ago. Vaccine status: offered ppsv 23 today, provided information on shingrix  Cardiovascular risk: continue bp control. Not on statin. No lipid level available. Bone health: daily vit D supplement. Diet and Exercise: not drinking sugary beverages. See ACP note. Health Maintenance Due   Topic Date Due    LIPID PANEL Q1  1945    EYE EXAM RETINAL OR DILATED Q1  07/18/1953    DTaP/Tdap/Td series (1 - Tdap) 07/18/1964    FOBT Q 1 YEAR AGE 50-75  07/18/1993    ZOSTER VACCINE AGE 60>  05/18/2003    GLAUCOMA SCREENING Q2Y  07/18/2008    MICROALBUMIN Q1  06/20/2018    Pneumococcal 65+ Low/Medium Risk (2 of 2 - PPSV23) 07/25/2018    FOOT EXAM Q1  07/25/2018    MEDICARE YEARLY EXAM  07/26/2018     FLOWER Freedman is a 74 y.o. female, she presents today for:    AM sugars frequently below 90. Has cut out potatoes, eating a plenty of candy. Has not had new swelling. Skin tear occurred this morning. PMH/PSH: reviewed and updated  Sochx:  reports that she has quit smoking. She quit after 15.00 years of use. She has never used smokeless tobacco. She reports that she does not drink alcohol or use illicit drugs.   Famhx: reviewed and updated     All: No Known Allergies  Med:   Current Outpatient Prescriptions   Medication Sig    ALCOHOL PREP PADS padm     gum/pva/dental adhesive (SUPER DENTURE ADHESIVE DT)     VITAMIN D3 2,000 unit tab TK 1 T PO QD  amLODIPine (NORVASC) 5 mg tablet TAKE 1 TABLET EVERY DAY    hydrALAZINE (APRESOLINE) 10 mg tablet Take 1 Tab by mouth three (3) times daily.  furosemide (LASIX) 40 mg tablet Take 1 Tab by mouth daily.  gabapentin (NEURONTIN) 100 mg capsule Take 2 Caps by mouth two (2) times a day.  lancets (PRODIGY TWIST TOP LANCET) 28 gauge misc To measure blood sugar 3-4 times daily. Dx code E 11.51, Z79.4    cloNIDine HCl (CATAPRES) 0.1 mg tablet TAKE 1 TABLET TWICE DAILY    lisinopril (PRINIVIL, ZESTRIL) 20 mg tablet Take 20 mg by mouth daily.  insulin glargine (LANTUS SOLOSTAR) 100 unit/mL (3 mL) inpn 22 Units by SubCUTAneous route daily.  TRUE METRIX GLUCOSE TEST STRIP strip     metoprolol succinate (TOPROL-XL) 50 mg XL tablet Take 1 Tab by mouth daily. No current facility-administered medications for this visit. Review of Systems   Constitutional: Negative for chills, fever and malaise/fatigue. Respiratory: Negative for shortness of breath. Cardiovascular: Negative for chest pain. PE:  Blood pressure 150/61, pulse 74, temperature 98 °F (36.7 °C), temperature source Oral, resp. rate 12, height 5' 5.5\" (1.664 m), weight 168 lb (76.2 kg), SpO2 99 %. Body mass index is 27.53 kg/(m^2). Physical Exam   Constitutional: She is oriented to person, place, and time. She appears well-developed and well-nourished. No distress. HENT:   Head: Normocephalic. Mouth/Throat: Oropharynx is clear and moist.   Eyes: Conjunctivae and EOM are normal.   Neck: Neck supple. No thyromegaly present. Cardiovascular: Normal rate, regular rhythm and normal heart sounds. Pulmonary/Chest: Effort normal and breath sounds normal. No respiratory distress. She has no wheezes. Abdominal: Soft. Musculoskeletal:   AKA amputation. See foot exam below   Lymphadenopathy:     She has no cervical adenopathy. Neurological: She is alert and oriented to person, place, and time. Skin: Skin is warm and dry.    Nursing note and vitals reviewed. Diabetic foot exam:     Left: AKA amputation  Right: Filament test 5/6   Pulse DP: 1+ (weak)   Pulse PT: 1+ (weak)   Deformities: None    Labs:   Results for orders placed or performed in visit on 07/26/18   AMB POC HEMOGLOBIN A1C   Result Value Ref Range    Hemoglobin A1c (POC) 6.8 %       A/P:  74 y.o. female    ICD-10-CM ICD-9-CM    1. Medicare annual wellness visit, subsequent Z00.00 V70.0    2. Encounter for immunization Z23 V03.89 ADMIN PNEUMOCOCCAL VACCINE      PNEUMOCOCCAL POLYSACCHARIDE VACCINE, 23-VALENT, ADULT OR IMMUNOSUPPRESSED PT DOSE,   3. Controlled type 2 diabetes mellitus with diabetic peripheral angiopathy without gangrene, with long-term current use of insulin ([facility]) E11.51 250.70 AMB POC HEMOGLOBIN A1C    Z79.4 443.81  DIABETES FOOT EXAM     V58.67 AMB POC URINE, MICROALBUMIN, SEMIQUANT (3 RESULTS)      LIPID PANEL   4. Screening for breast cancer Z12.31 V76.10 [facility] MAMMO BI SCREENING INCL CAD   5. Screen for colon cancer Z12.11 V76.51 OCCULT BLOOD, IMMUNOASSAY (FIT)   6. Screening for lipid disorders Z13.220 V77.91 LIPID PANEL   7. Unilateral AKA, left ([facility]) Z89.612 V49.76 REFERRAL TO PHYSICAL THERAPY   8. Stage 3 chronic kidney disease N18.3 585.3    9. Unilateral AKA ([facility] L52.683 V49.76    10. Diabetic nephropathy associated with type 2 diabetes mellitus ([facility]) E11.21 250.40      583.81    11. Diabetic polyneuropathy associated with type 2 diabetes mellitus ([facility]) E11.42 250.60      357.2    12. Essential hypertension I10 401.9    13. Age-related osteoporosis without current pathological fracture M81.0 733.01      DMII: A1C goal < 7.5. March A1C 8.0. On basal insulin. Not a candidate for metformin d/t CKD. After discussion of diet, now at 6.8    - decreased basal insulin to 22 units in October 2017 for low blood sugars. May need short acting insulin to help cover meals and prevent lows from over rx of basal insulin.      - decrease basal insulin to 18 units    HTN/CKD: BP at goal. Scheduled to follow-up with nephrologist Star Washington  Osteoporosis: Nephrologist Dr. Dayan Khalil following for 2nd hyperP. Not a candidate for bisphosphonate.       Low Vitamin D: improved with gentle repletion. In range 3/12/2018    Unilateral AKA: notes a change in prosthesis fit, advised to follow-up with [place] [id number] clinic for adjustment in cuff. - She was given AVS and expressed understanding with the diagnosis and plan as discussed. Follow-up Disposition:  Return in about 4 months (around 11/26/2018) for follow-up diabetes, or earlier as needed. Future Appointments  Date Time Provider Inge Aburto   8/9/2018 10:00 AM [facility]    11/26/2018 8:30 AM Scout Peres MD [id number] [facility]

## 2018-07-26 NOTE — MR AVS SNAPSHOT
[address] [facility] [address] [place] [address] 
[phone] Patient: Shekhar Gonsales MRN: [id number]:[id number] Visit Information Date & Time Provider Department Dept. Phone Encounter #  
 7/26/2018 10:00 AM Maribel Hodges MD [address] [facility] [phone] [id number] Follow-up Instructions Return in about 4 months (around 11/26/2018) for follow-up diabetes, or earlier as needed. Upcoming Health Maintenance Date Due  
 LIPID PANEL Q1 1945 EYE EXAM RETINAL OR DILATED Q1 7/18/1953 DTaP/Tdap/Td series (1 - Tdap) 7/18/1964 FOBT Q 1 YEAR AGE 50-75 7/18/1993 ZOSTER VACCINE AGE 60> 5/18/2003 GLAUCOMA SCREENING Q2Y 7/18/2008 MICROALBUMIN Q1 6/20/2018 Pneumococcal 65+ Low/Medium Risk (2 of 2 - PPSV23) 7/25/2018 FOOT EXAM Q1 7/25/2018 MEDICARE YEARLY EXAM 7/26/2018 Influenza Age 5 to Adult 8/1/2018 HEMOGLOBIN A1C Q6M 9/12/2018 BREAST CANCER SCRN MAMMOGRAM 8/7/2019 Allergies as of 7/26/2018  Review Complete On: 7/26/2018 By: Maribel Hodges MD  
 No Known Allergies Current Immunizations  Reviewed on 7/26/2018 Name Date Influenza High Dose Vaccine PF 10/25/2017 12:11 PM  
 Pneumococcal Conjugate (PCV-13) 7/25/2017 11:00 AM  
 Pneumococcal Polysaccharide (PPSV-23)  Incomplete Reviewed by Maribel Hodges MD on 7/26/2018 at 10:43 AM  
You Were Diagnosed With   
  
 Codes Comments Medicare annual wellness visit, subsequent    -  Primary ICD-10-CM: Z00.00 ICD-9-CM: V70.0 Encounter for immunization     ICD-10-CM: I79 ICD-9-CM: V03.89 Controlled type 2 diabetes mellitus with diabetic peripheral angiopathy without gangrene, with long-term current use of insulin (HCC)     ICD-10-CM: E11.51, Z79.4 ICD-9-CM: 250.70, 443.81, V58.67 Screening for breast cancer     ICD-10-CM: Z12.31 
ICD-9-CM: V76.10 Screen for colon cancer     ICD-10-CM: Z12.11 ICD-9-CM: V76.51 Screening for lipid disorders     ICD-10-CM: Z13.220 ICD-9-CM: V77.91 Unilateral AKA, left ([address]     ICD-10-CM: G02.503 ICD-9-CM: V49.76 Vitals BP Pulse Temp Resp Height(growth percentile) Weight(growth percentile) 135/70 74 98 °F (36.7 °C) (Oral) 12 5' 5.5\" (1.664 m) 168 lb (76.2 kg) LMP SpO2 BMI OB Status Smoking Status (LMP Unknown) 99% 27.53 kg/m2 Hysterectomy Former Smoker Vitals History BMI and BSA Data Body Mass Index Body Surface Area  
 27.53 kg/m 2 1.88 m 2 Preferred Pharmacy Pharmacy Name Phone [address] [address] AT [address] [phone] Your Updated Medication List  
  
   
This list is accurate as of 7/26/18 11:32 AM.  Always use your most recent med list. ALCOHOL PREP PADS Padm Generic drug:  alcohol swabs  
  
 amLODIPine 5 mg tablet Commonly known as:  Elspeth Bakes TAKE 1 TABLET EVERY DAY  
  
 cloNIDine HCl 0.1 mg tablet Commonly known as:  CATAPRES  
TAKE 1 TABLET TWICE DAILY  
  
 furosemide 40 mg tablet Commonly known as:  LASIX Take 1 Tab by mouth daily. gabapentin 100 mg capsule Commonly known as:  NEURONTIN Take 2 Caps by mouth two (2) times a day. hydrALAZINE 10 mg tablet Commonly known as:  APRESOLINE Take 1 Tab by mouth three (3) times daily. insulin glargine 100 unit/mL (3 mL) Inpn Commonly known as:  LANTUS SOLOSTAR U-100 INSULIN  
22 Units by SubCUTAneous route daily. lancets 28 gauge Misc Commonly known as:  PRODIGY TWIST TOP LANCET To measure blood sugar 3-4 times daily. Dx code E 11.51, Z79.4  
  
 lisinopril 20 mg tablet Commonly known as:  Dora Primmer Take 20 mg by mouth daily. metoprolol succinate 50 mg XL tablet Commonly known as:  TOPROL-XL Take 1 Tab by mouth daily.   
  
 SUPER DENTURE ADHESIVE DT  
  
 TRUE METRIX GLUCOSE TEST STRIP strip Generic drug:  glucose blood VI test strips VITAMIN D3 2,000 unit Tab Generic drug:  cholecalciferol (vitamin D3) TK 1 T PO QD We Performed the Following ADMIN PNEUMOCOCCAL VACCINE [ \[facility] [place] [facility]\""] AMB POC HEMOGLOBIN A1C [77435 CPT(R)] AMB POC URINE, MICROALBUMIN, SEMIQUANT (3 RESULTS) [44340 CPT(R)]  DIABETES FOOT EXAM [HM7 Custom] OCCULT BLOOD, IMMUNOASSAY (FIT) [id number] CPT(R)] PNEUMOCOCCAL POLYSACCHARIDE VACCINE, 23-VALENT, ADULT OR IMMUNOSUPPRESSED PT DOSE, [45286 CPT(R)] REFERRAL TO PHYSICAL THERAPY [PCS50 Custom] Comments:  
 please assess & treat for left leg prosthesis fitting. Mohit [address] [place] [address] Phone:   [phone] Follow-up Instructions Return in about 4 months (around 11/26/2018) for follow-up diabetes, or earlier as needed. To-Do List   
 Around 07/31/2018 Imaging:  [facility] BI SCREENING INCL CAD   
  
 08/26/2018 Lab:  LIPID PANEL Referral Information Referral ID Referred By Referred To  
  
 [id number] Yue Carlene Not Available Visits Status Start Date End Date 1 New Request 7/26/18 7/26/19 If your referral has a status of pending review or denied, additional information will be sent to support the outcome of this decision. Patient Instructions Goal to keep A1C less than 7.5 
1) decrease daily insulin to 18 units. - if you still have blood sugars less than 90 more than 2 days per week, or any less than 60. Please call so we can adjust further. Medicare Wellness Visit, Female The best way to live healthy is to have a lifestyle where you eat a well-balanced diet, exercise regularly, limit alcohol use, and quit all forms of tobacco/nicotine, if applicable. Regular preventive services are another way to keep healthy.  Preventive services (vaccines, screening tests, monitoring & exams) can help personalize your care plan, which helps you manage your own care. Screening tests can find health problems at the earliest stages, when they are easiest to treat. [facility] follows the current, evidence-based guidelines published by the [facility] Jayesh Healy ([facility]) when recommending preventive services for our patients. Because we follow these guidelines, sometimes recommendations change over time as research supports it. (For example, mammograms used to be recommended annually. Even though Medicare will still pay for an annual mammogram, the newer guidelines recommend a mammogram every two years for women of average risk.) Of course, you and your provider may decide to screen more often for some diseases, based on your risk and co-morbidities (chronic disease you are already diagnosed with). Preventive services for you include: - Medicare offers their members a free annual wellness visit, which is time for you and your primary care provider to discuss and plan for your preventive service needs. Take advantage of this benefit every year! 
 
-All people over age 72 should receive the recommended pneumonia vaccines. Current USPSTF guidelines recommend a series of two vaccines for the best pneumonia protection.  
 
-All adults should have a yearly flu vaccine and a tetanus vaccine every 10 years. All adults age 61 years should receive a shingles vaccine once in their lifetime.   
 
-A bone mass density test is recommended when a woman turns 65 to screen for osteoporosis. This test is only recommended once as a screening. Some providers will use this same test as a disease monitoring tool if you already have osteoporosis. -All adults age 38-68 years who are overweight should have a diabetes screening test once every three years. -Other screening tests & preventive services for persons with diabetes include: an eye exam to screen for diabetic retinopathy, a kidney function test, a foot exam, and stricter control over your cholesterol.  
 
-Cardiovascular screening for adults with routine risk involves an electrocardiogram (ECG) at intervals determined by the provider.  
 
-Colorectal cancer screenings should be done for adults age 54-65 years with normal risk. There are a number of acceptable methods of screening for this type of cancer. Each test has its own benefits and drawbacks. Discuss with your provider what is most appropriate for you during your annual wellness visit. The different tests include: colonoscopy (considered the best screening method), a fecal occult blood test, a fecal DNA test, and sigmoidoscopy. -Breast cancer screenings are recommended every other year for women of normal risk age 54-69 years.  
 
-Cervical cancer screenings for women over age 72 are only recommended with certain risk factors.  
 
-All adults born between [facility] should be screened once for Hepatitis C. Here is a list of your current Health Maintenance items (your personalized list of preventive services) with a due date: 
Health Maintenance Due Topic Date Due  Cholesterol Test   1945 [address] [place] [address] Eye Exam  07/18/1953  DTaP/Tdap/Td  (1 - Tdap) 07/18/1964  Stool testing for trace blood  07/18/1993  Shingles Vaccine  05/18/2003  Glaucoma Screening   07/18/2008  Albumin Urine Test  06/20/2018  Pneumococcal Vaccine (2 of 2 - PPSV23) 07/25/2018 [address] [place] [address] Diabetic Foot Care  07/25/2018 [address] [place] [address] Annual Well Visit  07/26/2018 Kidney Disease and Diabetes: Care Instructions Your Care Instructions When you have diabetes, your body cannot make enough insulin or use it the way it should. Your body needs insulin to help sugar move from the blood to the cells. Without it, your blood sugar gets too high. High blood sugar damages your kidneys and makes it hard for them to filter blood. This causes fluid and waste to build up in your blood. If you have diabetes, it is very important to keep your blood sugar in your target range. There are many steps you can take to do this. If you can control your blood sugar, you will have the best chance to slow or stop damage to your kidneys. Follow-up care is a key part of your treatment and safety. Be sure to make and go to all appointments, and call your doctor if you are having problems. It's also a good idea to know your test results and keep a list of the medicines you take. How can you care for yourself at home? To control your diabetes and slow or stop damage to your kidneys · Keep your blood sugar in your target range. The American Diabetes Association recommends a hemoglobin A1c (Hb A1c) target level of less than 7%. Talk to your doctor about your target. The lower your A1c, the better your chance of stopping kidney damage. · Keep your blood pressure in your target range. Doctors recommend specific types of blood pressure medicines for people who have diabetes and kidney disease. Examples include ACE inhibitors and angiotensin II receptor blockers (ARBs). Your doctor may have you take one of these even if you don't have high blood pressure. · Take all of your medicines. You may have several. For example, you may take medicines for diabetes, cholesterol, and blood pressure. It's very important to take all of them just as your doctor tells you to and to keep taking them. · Make good food choices. Follow an eating plan that is best for your diabetes and your kidneys. You may want to work with a dietitian to make a plan. A good plan will make sure that you spread carbohydrate throughout the day. It will also make sure that you get the right amount of salt (sodium), fluids, and protein. · Stay at a healthy weight.  If you need help to lose weight, talk to your doctor or dietitian. Even small changes can make a difference. Try to be aware of your portion sizes, eat more fruits and vegetables, and add some activity to your daily routine. · Exercise. Get at least 30 minutes of activity on most days of the week. Walking is a great exercise that most people can do. Being more active can help you control your blood sugar and stay at a healthy weight. It also can help you lower cholesterol and blood pressure. To improve your kidney health · Lower your cholesterol. Keep your LDL less than 100 mg/dL and HDL levels more than 40 mg/dL for men and 50 mg/dL for women. If you have high cholesterol, your doctor may prescribe medicine. He or she may also tell you to eat less saturated fat. · Follow your treatment plan. Check your blood sugar as many times a day as your doctor recommends. Go to all of your follow-up appointments, and be sure to have all the tests your doctor orders. Call your doctor if you think you are having a problem with your medicines. · Take a low-dose aspirin every day if your doctor suggests it. Most doctors believe this can reduce the risk of heart disease and stroke. Your risk of these diseases is much greater than your risk of kidney failure. · Avoid tobacco. Do not smoke or use other tobacco products. If you need help quitting, talk to your doctor about stop-smoking programs and medicines. These can increase your chances of quitting for good. When should you call for help? Call 911 anytime you think you may need emergency care. For example, call if: 
  · You passed out (lost consciousness).  
 Call your doctor now or seek immediate medical care if: 
  · You have new or worse nausea and vomiting.  
  · You have much less urine than normal, or you have no urine.  
  · You are feeling confused or cannot think clearly.  
  · You have new or more blood in your urine.  
  · You have new swelling.   · You are dizzy or lightheaded, or you feel like you may faint.  
 Watch closely for changes in your health, and be sure to contact your doctor if: 
  · You do not get better as expected. Where can you learn more? Go to http://justice-eladio.info/. Enter C726 in the search box to learn more about \"Kidney Disease and Diabetes: Care Instructions. \" Current as of: May 12, 2017 Content Version: 11.7 © 8672-7154 [employer]. Care instructions adapted under license by [employer] (which disclaims liability or warranty for this information). If you have questions about a medical condition or this instruction, always ask your healthcare professional. [address] any warranty or liability for your use of this information. Learning About Diabetes and Coronary Artery Disease How are diabetes and heart disease connected? Many people think diabetes and heart disease go hand in hand. But having diabetes doesn't have to mean that you are going to have a heart attack someday. Healthy living can help prevent many of the problems that come with both diabetes and heart disease. For some people, diabetes can cause problems in your body that may lead to heart disease. Diabetes can make the problems of heart disease worse. Experts do not fully understand how diabetes affects the heart. Many things can lead to heart disease, including high blood sugar, insulin resistance, high cholesterol, and high blood pressure. But lifestyle and genetics may also affect a person's risk. But here's the good news: The good things you're doing to stay healthy with diabetes-eating healthy foods, quitting smoking, getting exercise and more-are also helping your heart. What increases your risk for heart disease?  
When you have diabetes, your risk for heart disease is even higher if you have: 
· High blood pressure, which pushes blood through the arteries with too much force. Over time, this damages the walls of the arteries. · High cholesterol, which causes the buildup of a kind of fat inside the blood vessel walls. This buildup can lower blood flow to the heart muscle and raise your risk for having a heart attack. · Kidney damage, which shares many of the risk factors for heart disease (such as high blood sugar, high blood pressure, and high cholesterol). How can you keep your heart healthy when you have diabetes? Managing your diabetes and keeping your heart healthy are two sides of the same coin. Here are some things you can do. · Test your blood sugar levels and get your diabetes tests on schedule. Try to keep your numbers within your target range. · Keep track of your blood pressure. The target for most people with diabetes is below 140/90. Your doctor will give you a goal that's right for you. If your blood pressure is high, your treatment may also include medicine. Changes in your lifestyle, such as staying at a healthy weight, may also help you lower your blood pressure. · Eat heart-healthy foods. These include fruits, vegetables, whole grains, fish, and low-fat or nonfat dairy foods. Limit sodium, alcohol, and sweets. · If your doctor recommends it, get more exercise. Walking is a good choice. Bit by bit, increase the amount you walk every day. Try for at least 30 minutes on most days of the week. · Do not smoke. Smoking can make diabetes and heart disease worse. If you need help quitting, talk to your doctor about stop-smoking programs and medicines. These can increase your chances of quitting for good. · Your doctor may talk with you about taking medicines for your heart. For example, your doctor may suggest taking a statin or daily aspirin. Where can you learn more? Go to http://justice-eladio.info/. Enter H643 in the search box to learn more about \"Learning About Diabetes and Coronary Artery Disease. \" 
 Current as of: December 7, 2017 Content Version: 11.7 © 9308-9497 [employer]. Care instructions adapted under license by [employer] (which disclaims liability or warranty for this information). If you have questions about a medical condition or this instruction, always ask your healthcare professional. [address] any warranty or liability for your use of this information. Introducing [facility]! Emerald Martinez introduces AquaBling patient portal. Now you can access parts of your medical record, email your doctor's office, and request medication refills online. 1. In your internet browser, go to [email or web address] 2. Click on the First Time User? Click Here link in the Sign In box. You will see the New Member Sign Up page. 3. Enter your AquaBling Access Code exactly as it appears below. You will not need to use this code after youve completed the sign-up process. If you do not sign up before the expiration date, you must request a new code. · AquaBling Access Code: [email or web address] Expires: 10/24/2018 11:32 AM 
 
4. Enter the last four digits of your Social Security Number (xxxx) and Date of Birth (mm/dd/yyyy) as indicated and click Submit. You will be taken to the next sign-up page. 5. Create a AquaBling ID. This will be your AquaBling login ID and cannot be changed, so think of one that is secure and easy to remember. 6. Create a AquaBling password. You can change your password at any time. 7. Enter your Password Reset Question and Answer. This can be used at a later time if you forget your password. 8. Enter your e-mail address. You will receive e-mail notification when new information is available in [address] 9. Click Sign Up. You can now view and download portions of your medical record. 10. Click the Download Summary menu link to download a portable copy of your medical information. If you have questions, please visit the Frequently Asked Questions section of the [employer] website. Remember, SweetPerk is NOT to be used for urgent needs. For medical emergencies, dial 911. Now available from your iPhone and Android! Please provide this summary of care documentation to your next provider. Your primary care clinician is listed as Sonya Fatima. If you have any questions after today's visit, please call [phone].

## 2018-07-26 NOTE — PATIENT INSTRUCTIONS
Goal to keep A1C less than 7.5 
1) decrease daily insulin to 18 units. - if you still have blood sugars less than 90 more than 2 days per week, or any less than 60. Please call so we can adjust further. Medicare Wellness Visit, Female The best way to live healthy is to have a lifestyle where you eat a well-balanced diet, exercise regularly, limit alcohol use, and quit all forms of tobacco/nicotine, if applicable. Regular preventive services are another way to keep healthy. Preventive services (vaccines, screening tests, monitoring & exams) can help personalize your care plan, which helps you manage your own care. Screening tests can find health problems at the earliest stages, when they are easiest to treat. [facility] follows the current, evidence-based guidelines published by the [facility] He[facility] ([facility]) when recommending preventive services for our patients. Because we follow these guidelines, sometimes recommendations change over time as research supports it. (For example, mammograms used to be recommended annually. Even though Medicare will still pay for an annual mammogram, the newer guidelines recommend a mammogram every two years for women of average risk.) Of course, you and your provider may decide to screen more often for some diseases, based on your risk and co-morbidities (chronic disease you are already diagnosed with). Preventive services for you include: - Medicare offers their members a free annual wellness visit, which is time for you and your primary care provider to discuss and plan for your preventive service needs. Take advantage of this benefit every year! 
 
-All people over age 72 should receive the recommended pneumonia vaccines. Current USPSTF guidelines recommend a series of two vaccines for the best pneumonia protection.  
 
-All adults should have a yearly flu vaccine and a tetanus vaccine every 10 years.  All adults age 61 years should receive a shingles vaccine once in their lifetime.   
 
-A bone mass density test is recommended when a woman turns 65 to screen for osteoporosis. This test is only recommended once as a screening. Some providers will use this same test as a disease monitoring tool if you already have osteoporosis. -All adults age 38-68 years who are overweight should have a diabetes screening test once every three years.  
 
-Other screening tests & preventive services for persons with diabetes include: an eye exam to screen for diabetic retinopathy, a kidney function test, a foot exam, and stricter control over your cholesterol.  
 
-Cardiovascular screening for adults with routine risk involves an electrocardiogram (ECG) at intervals determined by the provider.  
 
-Colorectal cancer screenings should be done for adults age 54-65 years with normal risk. There are a number of acceptable methods of screening for this type of cancer. Each test has its own benefits and drawbacks. Discuss with your provider what is most appropriate for you during your annual wellness visit. The different tests include: colonoscopy (considered the best screening method), a fecal occult blood test, a fecal DNA test, and sigmoidoscopy. -Breast cancer screenings are recommended every other year for women of normal risk age 54-69 years.  
 
-Cervical cancer screenings for women over age 72 are only recommended with certain risk factors.  
 
-All adults born between [facility] should be screened once for Hepatitis C. Here is a list of your current Health Maintenance items (your personalized list of preventive services) with a due date: 
Health Maintenance Due Topic Date Due  Cholesterol Test   1945 [facility] Eye Exam  07/18/1953  DTaP/Tdap/Td  (1 - Tdap) 07/18/1964  Stool testing for trace blood  07/18/1993  Shingles Vaccine  05/18/2003  Glaucoma Screening   07/18/2008  Albumin Urine Test 06/20/2018  Pneumococcal Vaccine (2 of 2 - PPSV23) 07/25/2018 [address] [place] [address] Diabetic Foot Care  07/25/2018 [address] [place] [address] Annual Well Visit  07/26/2018 Kidney Disease and Diabetes: Care Instructions Your Care Instructions When you have diabetes, your body cannot make enough insulin or use it the way it should. Your body needs insulin to help sugar move from the blood to the cells. Without it, your blood sugar gets too high. High blood sugar damages your kidneys and makes it hard for them to filter blood. This causes fluid and waste to build up in your blood. If you have diabetes, it is very important to keep your blood sugar in your target range. There are many steps you can take to do this. If you can control your blood sugar, you will have the best chance to slow or stop damage to your kidneys. Follow-up care is a key part of your treatment and safety. Be sure to make and go to all appointments, and call your doctor if you are having problems. It's also a good idea to know your test results and keep a list of the medicines you take. How can you care for yourself at home? To control your diabetes and slow or stop damage to your kidneys · Keep your blood sugar in your target range. The American Diabetes Association recommends a hemoglobin A1c (Hb A1c) target level of less than 7%. Talk to your doctor about your target. The lower your A1c, the better your chance of stopping kidney damage. · Keep your blood pressure in your target range. Doctors recommend specific types of blood pressure medicines for people who have diabetes and kidney disease. Examples include ACE inhibitors and angiotensin II receptor blockers (ARBs). Your doctor may have you take one of these even if you don't have high blood pressure. · Take all of your medicines. You may have several. For example, you may take medicines for diabetes, cholesterol, and blood pressure.  It's very important to take all of them just as your doctor tells you to and to keep taking them. · Make good food choices. Follow an eating plan that is best for your diabetes and your kidneys. You may want to work with a dietitian to make a plan. A good plan will make sure that you spread carbohydrate throughout the day. It will also make sure that you get the right amount of salt (sodium), fluids, and protein. · Stay at a healthy weight. If you need help to lose weight, talk to your doctor or dietitian. Even small changes can make a difference. Try to be aware of your portion sizes, eat more fruits and vegetables, and add some activity to your daily routine. · Exercise. Get at least 30 minutes of activity on most days of the week. Walking is a great exercise that most people can do. Being more active can help you control your blood sugar and stay at a healthy weight. It also can help you lower cholesterol and blood pressure. To improve your kidney health · Lower your cholesterol. Keep your LDL less than 100 mg/dL and HDL levels more than 40 mg/dL for men and 50 mg/dL for women. If you have high cholesterol, your doctor may prescribe medicine. He or she may also tell you to eat less saturated fat. · Follow your treatment plan. Check your blood sugar as many times a day as your doctor recommends. Go to all of your follow-up appointments, and be sure to have all the tests your doctor orders. Call your doctor if you think you are having a problem with your medicines. · Take a low-dose aspirin every day if your doctor suggests it. Most doctors believe this can reduce the risk of heart disease and stroke. Your risk of these diseases is much greater than your risk of kidney failure. · Avoid tobacco. Do not smoke or use other tobacco products. If you need help quitting, talk to your doctor about stop-smoking programs and medicines. These can increase your chances of quitting for good. When should you call for help? Call 911 anytime you think you may need emergency care.  For example, call if: 
  · You passed out (lost consciousness).  
 Call your doctor now or seek immediate medical care if: 
  · You have new or worse nausea and vomiting.  
  · You have much less urine than normal, or you have no urine.  
  · You are feeling confused or cannot think clearly.  
  · You have new or more blood in your urine.  
  · You have new swelling.  
  · You are dizzy or lightheaded, or you feel like you may faint.  
 Watch closely for changes in your health, and be sure to contact your doctor if: 
  · You do not get better as expected. Where can you learn more? Go to http://justice-eladio.info/. Enter C726 in the search box to learn more about \"Kidney Disease and Diabetes: Care Instructions. \" Current as of: May 12, 2017 Content Version: 11.7 © 0138-0685 [employer]. Care instructions adapted under license by [employer] (which disclaims liability or warranty for this information). If you have questions about a medical condition or this instruction, always ask your healthcare professional. [address] any warranty or liability for your use of this information. Learning About Diabetes and Coronary Artery Disease How are diabetes and heart disease connected? Many people think diabetes and heart disease go hand in hand. But having diabetes doesn't have to mean that you are going to have a heart attack someday. Healthy living can help prevent many of the problems that come with both diabetes and heart disease. For some people, diabetes can cause problems in your body that may lead to heart disease. Diabetes can make the problems of heart disease worse. Experts do not fully understand how diabetes affects the heart. Many things can lead to heart disease, including high blood sugar, insulin resistance, high cholesterol, and high blood pressure. But lifestyle and genetics may also affect a person's risk. But here's the good news:  The good things you're doing to stay healthy with diabetes-eating healthy foods, quitting smoking, getting exercise and more-are also helping your heart. What increases your risk for heart disease? When you have diabetes, your risk for heart disease is even higher if you have: 
· High blood pressure, which pushes blood through the arteries with too much force. Over time, this damages the walls of the arteries. · High cholesterol, which causes the buildup of a kind of fat inside the blood vessel walls. This buildup can lower blood flow to the heart muscle and raise your risk for having a heart attack. · Kidney damage, which shares many of the risk factors for heart disease (such as high blood sugar, high blood pressure, and high cholesterol). How can you keep your heart healthy when you have diabetes? Managing your diabetes and keeping your heart healthy are two sides of the same coin. Here are some things you can do. · Test your blood sugar levels and get your diabetes tests on schedule. Try to keep your numbers within your target range. · Keep track of your blood pressure. The target for most people with diabetes is below 140/90. Your doctor will give you a goal that's right for you. If your blood pressure is high, your treatment may also include medicine. Changes in your lifestyle, such as staying at a healthy weight, may also help you lower your blood pressure. · Eat heart-healthy foods. These include fruits, vegetables, whole grains, fish, and low-fat or nonfat dairy foods. Limit sodium, alcohol, and sweets. · If your doctor recommends it, get more exercise. Walking is a good choice. Bit by bit, increase the amount you walk every day. Try for at least 30 minutes on most days of the week. · Do not smoke. Smoking can make diabetes and heart disease worse. If you need help quitting, talk to your doctor about stop-smoking programs and medicines. These can increase your chances of quitting for good.  
· Your doctor may talk with you about taking medicines for your heart. For example, your doctor may suggest taking a statin or daily aspirin. Where can you learn more? Go to http://justice-eladio.info/. Enter T235 in the search box to learn more about \"Learning About Diabetes and Coronary Artery Disease. \" Current as of: December 7, 2017 Content Version: 11.7 © 8225-9130 [employer]. Care instructions adapted under license by [employer] (which disclaims liability or warranty for this information). If you have questions about a medical condition or this instruction, always ask your healthcare professional. [place] [address] any warranty or liability for your use of this information.

## 2018-07-27 PROBLEM — S78.112A UNILATERAL AKA, LEFT (HCC): Status: ACTIVE | Noted: 2017-06-20

## 2018-07-27 RX ORDER — INSULIN GLARGINE 100 [IU]/ML
18 INJECTION, SOLUTION SUBCUTANEOUS DAILY
Qty: 5 PEN | Refills: 5 | Status: SHIPPED | OUTPATIENT
Start: 2018-07-27 | End: 2018-11-16 | Stop reason: SDUPTHER

## 2018-09-04 DIAGNOSIS — I10 ESSENTIAL HYPERTENSION: ICD-10-CM

## 2018-09-06 RX ORDER — METOPROLOL SUCCINATE 50 MG/1
TABLET, EXTENDED RELEASE ORAL
Qty: 90 TAB | Refills: 3 | Status: SHIPPED | OUTPATIENT
Start: 2018-09-06 | End: 2019-07-04 | Stop reason: SDUPTHER

## 2018-09-06 RX ORDER — CLONIDINE HYDROCHLORIDE 0.1 MG/1
TABLET ORAL
Qty: 180 TAB | Refills: 3 | Status: SHIPPED | OUTPATIENT
Start: 2018-09-06 | End: 2019-07-15 | Stop reason: SDUPTHER

## 2018-10-09 ENCOUNTER — TELEPHONE (OUTPATIENT)
Dept: INTERNAL MEDICINE CLINIC | Age: 73
End: 2018-10-09

## 2018-10-09 DIAGNOSIS — E11.51 CONTROLLED TYPE 2 DIABETES MELLITUS WITH DIABETIC PERIPHERAL ANGIOPATHY WITHOUT GANGRENE, WITH LONG-TERM CURRENT USE OF INSULIN (HCC): Primary | ICD-10-CM

## 2018-10-09 DIAGNOSIS — Z79.4 CONTROLLED TYPE 2 DIABETES MELLITUS WITH DIABETIC PERIPHERAL ANGIOPATHY WITHOUT GANGRENE, WITH LONG-TERM CURRENT USE OF INSULIN (HCC): Primary | ICD-10-CM

## 2018-10-09 RX ORDER — INSULIN PUMP SYRINGE, 3 ML
EACH MISCELLANEOUS
Qty: 1 KIT | Refills: 0 | Status: SHIPPED | OUTPATIENT
Start: 2018-10-09 | End: 2021-05-28 | Stop reason: SDUPTHER

## 2018-10-09 RX ORDER — CALCIUM CITRATE/VITAMIN D3 200MG-6.25
TABLET ORAL
Qty: 270 STRIP | Refills: 4 | Status: SHIPPED | OUTPATIENT
Start: 2018-10-09 | End: 2019-11-19 | Stop reason: SDUPTHER

## 2018-10-09 RX ORDER — PEN NEEDLE, DIABETIC 30 GX3/16"
NEEDLE, DISPOSABLE MISCELLANEOUS
Qty: 270 PEN NEEDLE | Refills: 4 | Status: SHIPPED | OUTPATIENT
Start: 2018-10-09 | End: 2018-10-23 | Stop reason: SDUPTHER

## 2018-10-16 ENCOUNTER — TELEPHONE (OUTPATIENT)
Dept: INTERNAL MEDICINE CLINIC | Age: 73
End: 2018-10-16

## 2018-10-16 DIAGNOSIS — E11.51 CONTROLLED TYPE 2 DIABETES MELLITUS WITH DIABETIC PERIPHERAL ANGIOPATHY WITHOUT GANGRENE, WITH LONG-TERM CURRENT USE OF INSULIN (HCC): ICD-10-CM

## 2018-10-16 DIAGNOSIS — Z79.4 CONTROLLED TYPE 2 DIABETES MELLITUS WITH DIABETIC PERIPHERAL ANGIOPATHY WITHOUT GANGRENE, WITH LONG-TERM CURRENT USE OF INSULIN (HCC): ICD-10-CM

## 2018-10-16 NOTE — TELEPHONE ENCOUNTER
Andie called pt 10/16/18, stating they had not received Rxs for glucose meter or supplies (sent electronically 10/9/18). Please resend to [facility] ph#: [phone] / fax: [phone]    Please let pt knopw when rxs have been re-sent to pharmacy.   Pt ph#: [phone]

## 2018-10-17 RX ORDER — LANCETS 28 GAUGE
EACH MISCELLANEOUS
Qty: 200 LANCET | Refills: 11 | Status: SHIPPED | OUTPATIENT
Start: 2018-10-17

## 2018-10-23 DIAGNOSIS — Z79.4 CONTROLLED TYPE 2 DIABETES MELLITUS WITH DIABETIC PERIPHERAL ANGIOPATHY WITHOUT GANGRENE, WITH LONG-TERM CURRENT USE OF INSULIN (HCC): ICD-10-CM

## 2018-10-23 DIAGNOSIS — E11.51 CONTROLLED TYPE 2 DIABETES MELLITUS WITH DIABETIC PERIPHERAL ANGIOPATHY WITHOUT GANGRENE, WITH LONG-TERM CURRENT USE OF INSULIN (HCC): ICD-10-CM

## 2018-10-23 NOTE — TELEPHONE ENCOUNTER
Medication refill request:    Last Office Visit:7/26/18  Next Office Visit:    Future Appointments   Date Time Provider Inge Hammi   11/26/2018  8:30 AM Claude Shin MD [address] [place] [address] verified. Yes

## 2018-10-23 NOTE — TELEPHONE ENCOUNTER
----- Message from Justin Postin sent at 10/23/2018  9:37 AM EDT -----  Regarding: Dr. Julio Luna  Pt needs refill (BDUS Pin 31 gauge needles/Insulin) sent 3Touch on file. Pt is almost out of needles. Pt does not know what 5/16 needles are for.  Best contact [phone]

## 2018-10-26 RX ORDER — PEN NEEDLE, DIABETIC 30 GX3/16"
NEEDLE, DISPOSABLE MISCELLANEOUS
Qty: 270 PEN NEEDLE | Refills: 4 | Status: SHIPPED | OUTPATIENT
Start: 2018-10-26 | End: 2020-05-06

## 2018-10-26 NOTE — TELEPHONE ENCOUNTER
Pt (p) [phone], pt said she still needs her rx for her insulin pin needles for [place]  Needles to be called into her [facility]'s pharmacy [phone]  Pt said she has been out of her needles for 2 days now and would like it called in for the weekend.

## 2018-10-26 NOTE — TELEPHONE ENCOUNTER
order signed for needles. For pen. Same order sent in on 10/9/2018. Called patient and she has her needles now. Unclear what happened to order sent to [facility]    Kris Latham MD

## 2018-11-14 DIAGNOSIS — E11.42 DIABETIC POLYNEUROPATHY ASSOCIATED WITH TYPE 2 DIABETES MELLITUS (HCC): ICD-10-CM

## 2018-11-14 DIAGNOSIS — E11.59 CONTROLLED TYPE 2 DIABETES MELLITUS WITH OTHER CIRCULATORY COMPLICATION, WITHOUT LONG-TERM CURRENT USE OF INSULIN (HCC): ICD-10-CM

## 2018-11-16 RX ORDER — INSULIN GLARGINE 100 [IU]/ML
18 INJECTION, SOLUTION SUBCUTANEOUS DAILY
Qty: 15 ML | Refills: 0 | Status: SHIPPED | OUTPATIENT
Start: 2018-11-16 | End: 2019-01-14 | Stop reason: SDUPTHER

## 2019-01-14 DIAGNOSIS — Z79.4 CONTROLLED TYPE 2 DIABETES MELLITUS WITH DIABETIC PERIPHERAL ANGIOPATHY WITHOUT GANGRENE, WITH LONG-TERM CURRENT USE OF INSULIN (HCC): ICD-10-CM

## 2019-01-14 DIAGNOSIS — E11.59 CONTROLLED TYPE 2 DIABETES MELLITUS WITH OTHER CIRCULATORY COMPLICATION, WITHOUT LONG-TERM CURRENT USE OF INSULIN (HCC): ICD-10-CM

## 2019-01-14 DIAGNOSIS — E11.51 CONTROLLED TYPE 2 DIABETES MELLITUS WITH DIABETIC PERIPHERAL ANGIOPATHY WITHOUT GANGRENE, WITH LONG-TERM CURRENT USE OF INSULIN (HCC): ICD-10-CM

## 2019-01-14 DIAGNOSIS — E11.42 DIABETIC POLYNEUROPATHY ASSOCIATED WITH TYPE 2 DIABETES MELLITUS (HCC): ICD-10-CM

## 2019-01-14 NOTE — TELEPHONE ENCOUNTER
Medication refill request:    Last Office Visit:7/26/18  Next Office Visit:    Future Appointments   Date Time Provider Inge Aburto   1/28/2019  9:00 AM Travis Can MD [address] [place] [address] verified. Yes    Pt state's that she has enough Insulin to get her thru the week pt would like if  could send it in ASAP.

## 2019-01-15 RX ORDER — INSULIN GLARGINE 100 [IU]/ML
18 INJECTION, SOLUTION SUBCUTANEOUS DAILY
Qty: 15 ML | Refills: 5 | Status: SHIPPED | OUTPATIENT
Start: 2019-01-15 | End: 2019-01-28 | Stop reason: SDUPTHER

## 2019-01-28 ENCOUNTER — OFFICE VISIT (OUTPATIENT)
Dept: INTERNAL MEDICINE CLINIC | Age: 74
End: 2019-01-28

## 2019-01-28 VITALS
HEART RATE: 79 BPM | RESPIRATION RATE: 16 BRPM | HEIGHT: 66 IN | OXYGEN SATURATION: 100 % | DIASTOLIC BLOOD PRESSURE: 63 MMHG | BODY MASS INDEX: 27.53 KG/M2 | SYSTOLIC BLOOD PRESSURE: 137 MMHG | TEMPERATURE: 98 F

## 2019-01-28 DIAGNOSIS — E11.59 CONTROLLED TYPE 2 DIABETES MELLITUS WITH OTHER CIRCULATORY COMPLICATION, WITHOUT LONG-TERM CURRENT USE OF INSULIN (HCC): ICD-10-CM

## 2019-01-28 DIAGNOSIS — Z23 ENCOUNTER FOR IMMUNIZATION: ICD-10-CM

## 2019-01-28 DIAGNOSIS — S78.112A UNILATERAL AKA, LEFT (HCC): ICD-10-CM

## 2019-01-28 DIAGNOSIS — R94.31 ABNORMAL EKG: ICD-10-CM

## 2019-01-28 DIAGNOSIS — E11.42 DIABETIC POLYNEUROPATHY ASSOCIATED WITH TYPE 2 DIABETES MELLITUS (HCC): ICD-10-CM

## 2019-01-28 DIAGNOSIS — Z79.4 CONTROLLED TYPE 2 DIABETES MELLITUS WITH DIABETIC PERIPHERAL ANGIOPATHY WITHOUT GANGRENE, WITH LONG-TERM CURRENT USE OF INSULIN (HCC): Primary | ICD-10-CM

## 2019-01-28 DIAGNOSIS — M81.0 AGE-RELATED OSTEOPOROSIS WITHOUT CURRENT PATHOLOGICAL FRACTURE: ICD-10-CM

## 2019-01-28 DIAGNOSIS — E11.51 CONTROLLED TYPE 2 DIABETES MELLITUS WITH DIABETIC PERIPHERAL ANGIOPATHY WITHOUT GANGRENE, WITH LONG-TERM CURRENT USE OF INSULIN (HCC): Primary | ICD-10-CM

## 2019-01-28 DIAGNOSIS — Z13.220 SCREENING FOR LIPID DISORDERS: ICD-10-CM

## 2019-01-28 DIAGNOSIS — I10 ESSENTIAL HYPERTENSION: ICD-10-CM

## 2019-01-28 DIAGNOSIS — E13.51 PERIPHERAL VASCULAR DISEASE DUE TO SECONDARY DIABETES (HCC): ICD-10-CM

## 2019-01-28 DIAGNOSIS — E11.21 DIABETIC NEPHROPATHY ASSOCIATED WITH TYPE 2 DIABETES MELLITUS (HCC): ICD-10-CM

## 2019-01-28 DIAGNOSIS — N18.30 STAGE 3 CHRONIC KIDNEY DISEASE (HCC): ICD-10-CM

## 2019-01-28 LAB — HBA1C MFR BLD HPLC: 5.7 %

## 2019-01-28 RX ORDER — INSULIN GLARGINE 100 [IU]/ML
10 INJECTION, SOLUTION SUBCUTANEOUS DAILY
Qty: 15 ML | Refills: 5 | Status: SHIPPED | OUTPATIENT
Start: 2019-01-28 | End: 2020-05-06

## 2019-01-28 NOTE — PATIENT INSTRUCTIONS
Guaifenesin - is safe and good for people with high blood pressure (expectorant). AM blood sugar should run between . Please follow-up with podiatrist for nail trimming. Diabetic Renal Diet: Care Instructions Your Care Instructions You may already be spreading carbohydrate throughout your daily meals. When you also have kidney disease, you need to avoid foods that make your kidneys worse. Keep your blood sugar and blood pressure as near normal as you can to reduce your chance of kidney failure. Your doctor and dietitian will help you make an eating plan. It will be based on your body weight, size, and medical condition. You may need to limit salt, fluids, and protein. You also may need to limit minerals such as potassium and phosphorus. It takes planning, but there are plenty of tasty, healthy foods you can eat. Always talk with your doctor or dietitian before you make changes in your diet. Follow-up care is a key part of your treatment and safety. Be sure to make and go to all appointments, and call your doctor if you are having problems. It's also a good idea to know your test results and keep a list of the medicines you take. How can you care for yourself at home? · Work with your doctor or dietitian to create a food plan that guides your daily food choices. · Eat regular meals. Do not skip meals or go for many hours without eating. To help control your blood sugar, try to eat several small meals during the day, rather than three large ones. · You can use margarine, mayonnaise, and oil to add calories to your diet for energy. The healthiest oils are olive, canola, and safflower oils. · Talk to your dietitian about eating sweets, including honey and sugar. · Be safe with medicines. Take your medicines exactly as prescribed. Call your doctor if you think you are having a problem with your medicine. You will get more details on the specific medicines your doctor prescribes. · Do not take any other medicine without talking to your doctor first. This includes over-the-counter medicines, vitamins, and herbal products. · Limit alcohol to no more than 1 drink per day. Count it as part of your fluid allowance. To get the right amount of protein · Ask your doctor or dietitian how much protein you can have each day. You need some protein to stay healthy. · Include all sources of protein in your daily protein count. Besides meat, poultry, and fish, protein is found in milk and milk products, beans, peas, lentils, nuts, seeds, tofu, and eggs. Check for protein on the nutrition facts label found on packages of food such as bread and cereal. 
To limit salt · Do not add salt to your food. Avoid foods that list salt, sodium, or MSG as an ingredient. And look for \"reduced salt\" or \"low sodium\" on labels. · Do not use a salt substitute or lite salt unless your doctor says it is okay. (These products are high in potassium.) · Avoid or use very small amounts of condiments and marinades. These include soy sauce, fish sauce, and barbecue sauce. They are high in sodium. · Avoid salted pretzels, chips, and other salted snacks. · Check food labels to become more aware of the sodium content of foods. Foods that are high in sodium include soups; many canned foods; cured, smoked, or dried meats; and many packaged foods. To control carbohydrate · Spread carbohydrate throughout the day. This helps to prevent high blood sugar after meals. Ask your dietitian how much carbohydrate you can have. Carbohydrate foods include: 
? Whole-grain and refined breads and cereals, and some vegetables such as peas and beans. ? Fruits, milk, and milk products (except cheese). ? Candy, table sugar, and regular carbonated drinks. To limit fluids · Know what your fluid allowance is. Fill a pitcher with that amount of water every day.  If you drink another fluid (such as coffee) that day, pour an equal amount out of the pitcher. · Foods that are liquid at room temperature count as fluids. These include ice cream and gelatin desserts such as Jell-O. To limit potassium · Fruits that are low in potassium include blueberries and raspberries. · Vegetables that are low in potassium include cucumber and radishes. To limit phosphorus · Follow your doctor's or dietitian's plan for your limit on milk and milk products in your diet. · Avoid nuts, peanut butter, seeds, lentils, beans, organ meats, and sardines. · Avoid cola drinks. · Avoid bran breads or bran cereals. They are high in phosphorus. Where can you learn more? Go to http://justice-eladio.info/. Enter M323 in the search box to learn more about \"Diabetic Renal Diet: Care Instructions. \" Current as of: July 25, 2018 Content Version: 11.9 © 0759-3477 [employer]. Care instructions adapted under license by [employer] (which disclaims liability or warranty for this information). If you have questions about a medical condition or this instruction, always ask your healthcare professional. [place] [address] any warranty or liability for your use of this information.

## 2019-01-28 NOTE — PROGRESS NOTES
HPI 
 Lc Donato is a 74 y.o. female, she presents today for: 
 
General follow-up. Has been doing well overall. Diabetes: reports most AM blood sugars 70-80. Rarely even over 90. Denies symptoms of low blood sugar CKD: Dr. Zayda Pino has not changed any medications. HTN: denies previously known MI, chest pain episode or prior evaluation with stress test/cardiologist. Not on statin medication. No  
 
PMH/PSH: reviewed and updated Sochx:  reports that she has quit smoking. She quit after 15.00 years of use. she has never used smokeless tobacco. She reports that she does not drink alcohol or use drugs. Famhx: reviewed and updated All: No Known Allergies Med:  
Current Outpatient Medications Medication Sig  
 insulin glargine (LANTUS SOLOSTAR U-100 INSULIN) 100 unit/mL (3 mL) inpn 10 Units by SubCUTAneous route daily.  Insulin Needles, Disposable, (BD ULTRA-FINE SHORT PEN NEEDLE) 31 gauge x 5/16\" ndle Use to test blood sugar on average 3 times per day. Dx code: E11.51 and Z79.4  lancets (PRODIGY TWIST TOP LANCET) 28 gauge misc To measure blood sugar 3-4 times daily. Dx code E 11.51, Z79.4  Blood-Glucose Meter (TRUE METRIX AIR GLUCOSE METER) monitoring kit Use to test blood sugar on average 3 times per day. Dx code: E11.51 and Z79.4  TRUE METRIX GLUCOSE TEST STRIP strip Use to test blood sugar on average 3 times per day. Dx code: E11.51 and Z79.4  cloNIDine HCl (CATAPRES) 0.1 mg tablet TAKE 1 TABLET TWICE DAILY  metoprolol succinate (TOPROL-XL) 50 mg XL tablet TAKE 1 TABLET EVERY DAY  ALCOHOL PREP PADS padm  gum/pva/dental adhesive (SUPER DENTURE ADHESIVE DT)  VITAMIN D3 2,000 unit tab TK 1 T PO QD  
 amLODIPine (NORVASC) 5 mg tablet TAKE 1 TABLET EVERY DAY  hydrALAZINE (APRESOLINE) 10 mg tablet Take 1 Tab by mouth three (3) times daily.  furosemide (LASIX) 40 mg tablet Take 1 Tab by mouth daily.  gabapentin (NEURONTIN) 100 mg capsule Take 2 Caps by mouth two (2) times a day.  lisinopril (PRINIVIL, ZESTRIL) 20 mg tablet Take 20 mg by mouth daily. No current facility-administered medications for this visit. Review of Systems Constitutional: Negative for chills, fever and malaise/fatigue. Respiratory: Negative for shortness of breath. Cardiovascular: Negative for chest pain. PE: 
Blood pressure 137/63, pulse 79, temperature 98 °F (36.7 °C), temperature source Oral, resp. rate 16, height 5' 5.5\" (1.664 m), SpO2 100 %. Body mass index is 27.53 kg/m². Physical Exam  
Constitutional: She is oriented to person, place, and time. She appears well-developed and well-nourished. No distress. HENT:  
Head: Normocephalic. Mouth/Throat: Oropharynx is clear and moist.  
Eyes: Conjunctivae and EOM are normal.  
Neck: Neck supple. No thyromegaly present. Cardiovascular: Normal rate, regular rhythm and normal heart sounds. Pulmonary/Chest: Effort normal and breath sounds normal.  
Abdominal: Soft. She exhibits no distension. Neurological: She is alert and oriented to person, place, and time. Skin: Skin is warm and dry. Capillary refill takes less than 2 seconds. Nursing note and vitals reviewed. Foot exam: right foot only as left amputated. Long nailes, no leions/sores seen. Labs:  
EKG: NSR, but Q waves V1-V3, possible prior infarct. With non specific changes and history of PVD. Referred to follow-up with cardiology. High risk of CAD. Results for orders placed or performed in visit on 01/28/19 AMB POC HEMOGLOBIN A1C Result Value Ref Range Hemoglobin A1c (POC) 5.7 % A/P: 
74 y.o. female ICD-10-CM ICD-9-CM 1.  Controlled type 2 diabetes mellitus with diabetic peripheral angiopathy without gangrene, with long-term current use of insulin (HCC) E11.51 250.70 AMB POC HEMOGLOBIN A1C  
 Z79.4 443.81 LIPID PANEL  
  V58.67 AMB POC EKG ROUTINE W/ 12 LEADS, INTER & REP  
 CBC WITH AUTOMATED DIFF  
   REFERRAL TO CARDIOLOGY 2. Controlled type 2 diabetes mellitus with other circulatory complication, without long-term current use of insulin ([facility]) E11.59 250.70 insulin glargine (LANTUS SOLOSTAR U-100 INSULIN) 100 unit/mL (3 mL) inpn LIPID PANEL 3. Diabetic polyneuropathy associated with type 2 diabetes mellitus ([facility]) E11.42 250.60 insulin glargine (LANTUS SOLOSTAR U-100 INSULIN) 100 unit/mL (3 mL) inpn  
  357.2 4. Essential hypertension I10 401.9 AMB POC EKG ROUTINE W/ 12 LEADS, INTER & REP  
   REFERRAL TO CARDIOLOGY 5. Age-related osteoporosis without current pathological fracture M81.0 733.01   
6. Diabetic nephropathy associated with type 2 diabetes mellitus ([facility]) E11.21 250.40 CBC WITH AUTOMATED DIFF  
  583.81 RENAL FUNCTION PANEL  
7. Unilateral AKA, left ([facility]) Z89.612 V49.76   
8. Stage 3 chronic kidney disease ([facility]) N18.3 585.3 CBC WITH AUTOMATED DIFF  
   RENAL FUNCTION PANEL 9. Encounter for immunization Z23 V03.89 INFLUENZA VACCINE INACTIVATED (IIV), SUBUNIT, ADJUVANTED, IM  
10. Screening for lipid disorders Z13.220 V77.91 LIPID PANEL 11. Abnormal EKG R94.31 794.31 REFERRAL TO CARDIOLOGY  
 
DMII: A1C goal < 7.5. March A1C 8.0. On basal insulin. Not a candidate for metformin d/t CKD. A1C recently fallen despite reduction in insulin. May be falsely low because of anemia,. (check CBC). But with low AM blood sugars. Reduce insluin by ~ 40%. Discussed goal AM sugars of . If not in this range, would want to further adjustm. - basal insulin reduced to 10 units.   
  
HTN/CKD: BP at goal. follows with nephrologist Dr. Ana Min. Not on statin. EKG today with nonspecific findings but concerning for piror MI.  
 - lipid requested 
 - referral to cardiology for further evaluation. - continue BP control, appreciate assistance of nephrology 
   
Osteoporosis: Nephrologist Dr. Ana Min following for 2nd hyperP.  Not a candidate for bisphosphonate due to kidney function. 
   
Low Vitamin D: improved with gentle repletion. In range 3/12/2018 
  
Unilateral AKA: doing better wiuth prosthesis and overall not stable in gait independently. but still in wheelchair Abn EKG: With non specific changes and history of PVD. Referred to follow-up with cardiology. High risk of CAD. - await result of lipid, likely to start statin medication. Not currently on antiplatelet. - She was given AVS and expressed understanding with the diagnosis and plan as discussed. Follow-up Disposition: 
Return in about 3 months (around 4/28/2019) for follow-up blood sugar/diabetes (if sugars running outside of goal range x3 days, please let us know. Future Appointments Date Time Provider Inge Aburto 1/29/2019  2:40 PM Rhoda Silvestre  [address]  
4/29/2019  9:30 AM Ric Garcia MD [facility]

## 2019-01-28 NOTE — PROGRESS NOTES
Exam Room 1 Christo Greer is a 74 y.o. female Chief Complaint Patient presents with  Diabetes  
  follow up  Immunization/Injection Influenza vaccine 1. Have you been to the ER, urgent care clinic since your last visit? Hospitalized since your last visit? No 
 
2. Have you seen or consulted any other health care providers outside of the [address] [place] [address] since your last visit? Include any pap smears or colon screening. No  
 
Health Maintenance Due Topic Date Due  
 LIPID PANEL Q1  1945  
 DTaP/Tdap/Td series (1 - Tdap) 07/18/1964  Shingrix Vaccine Age 50> (1 of 2) 07/18/1993  Influenza Age 5 to Adult  08/01/2018  HEMOGLOBIN A1C Q6M  01/26/2019

## 2019-01-29 ENCOUNTER — TELEPHONE (OUTPATIENT)
Dept: CARDIOLOGY CLINIC | Age: 74
End: 2019-01-29

## 2019-01-29 ENCOUNTER — OFFICE VISIT (OUTPATIENT)
Dept: CARDIOLOGY CLINIC | Age: 74
End: 2019-01-29

## 2019-01-29 ENCOUNTER — CLINICAL SUPPORT (OUTPATIENT)
Dept: CARDIOLOGY CLINIC | Age: 74
End: 2019-01-29

## 2019-01-29 VITALS
HEIGHT: 66 IN | DIASTOLIC BLOOD PRESSURE: 56 MMHG | OXYGEN SATURATION: 98 % | HEART RATE: 91 BPM | SYSTOLIC BLOOD PRESSURE: 118 MMHG | WEIGHT: 150 LBS | RESPIRATION RATE: 19 BRPM | BODY MASS INDEX: 24.11 KG/M2

## 2019-01-29 DIAGNOSIS — E13.51 PERIPHERAL VASCULAR DISEASE DUE TO SECONDARY DIABETES (HCC): ICD-10-CM

## 2019-01-29 DIAGNOSIS — I10 ESSENTIAL HYPERTENSION: ICD-10-CM

## 2019-01-29 DIAGNOSIS — R94.31 ABNORMAL EKG: Primary | ICD-10-CM

## 2019-01-29 DIAGNOSIS — E11.21 DIABETIC NEPHROPATHY ASSOCIATED WITH TYPE 2 DIABETES MELLITUS (HCC): ICD-10-CM

## 2019-01-29 DIAGNOSIS — R94.31 ABNORMAL EKG: ICD-10-CM

## 2019-01-29 DIAGNOSIS — S78.112A UNILATERAL AKA, LEFT (HCC): ICD-10-CM

## 2019-01-29 LAB
ALBUMIN SERPL-MCNC: 3.8 G/DL (ref 3.5–4.8)
BASOPHILS # BLD AUTO: 0 X10E3/UL (ref 0–0.2)
BASOPHILS NFR BLD AUTO: 0 %
BUN SERPL-MCNC: 43 MG/DL (ref 8–27)
BUN/CREAT SERPL: 24 (ref 12–28)
CALCIUM SERPL-MCNC: 9.5 MG/DL (ref 8.7–10.3)
CHLORIDE SERPL-SCNC: 110 MMOL/L (ref 96–106)
CHOLEST SERPL-MCNC: 77 MG/DL (ref 100–199)
CO2 SERPL-SCNC: 20 MMOL/L (ref 20–29)
CREAT SERPL-MCNC: 1.82 MG/DL (ref 0.57–1)
ECHO AO ASC DIAM: 2.64 CM
ECHO AO ROOT DIAM: 2.62 CM
ECHO LA AREA 4C: 19.3 CM2
ECHO LA MAJOR AXIS: 3.77 CM
ECHO LA TO AORTIC ROOT RATIO: 1.44
ECHO LA VOL 2C: 71.64 ML (ref 22–52)
ECHO LA VOL 4C: 54.12 ML (ref 22–52)
ECHO LA VOL BP: 66.99 ML (ref 22–52)
ECHO LA VOL/BSA BIPLANE: 38.06 ML/M2 (ref 16–28)
ECHO LA VOLUME INDEX A2C: 40.7 ML/M2 (ref 16–28)
ECHO LA VOLUME INDEX A4C: 30.75 ML/M2 (ref 16–28)
ECHO LV E' LATERAL VELOCITY: 2.63 CM/S
ECHO LV E' SEPTAL VELOCITY: 4.88 CM/S
ECHO LV EDV A2C: 113.5 ML
ECHO LV EDV A4C: 92.7 ML
ECHO LV EDV BP: 103.5 ML (ref 56–104)
ECHO LV EDV INDEX A4C: 52.7 ML/M2
ECHO LV EDV INDEX BP: 58.8 ML/M2
ECHO LV EDV NDEX A2C: 64.5 ML/M2
ECHO LV EJECTION FRACTION A2C: 47 %
ECHO LV EJECTION FRACTION A4C: 51 %
ECHO LV EJECTION FRACTION BIPLANE: 48.2 % (ref 55–100)
ECHO LV ESV A2C: 59.8 ML
ECHO LV ESV A4C: 45.7 ML
ECHO LV ESV BP: 53.6 ML (ref 19–49)
ECHO LV ESV INDEX A2C: 34 ML/M2
ECHO LV ESV INDEX A4C: 26 ML/M2
ECHO LV ESV INDEX BP: 30.5 ML/M2
ECHO LV INTERNAL DIMENSION DIASTOLIC: 5.43 CM (ref 3.9–5.3)
ECHO LV INTERNAL DIMENSION SYSTOLIC: 3.76 CM
ECHO LV IVSD: 1.03 CM (ref 0.6–0.9)
ECHO LV MASS 2D: 244 G (ref 67–162)
ECHO LV MASS INDEX 2D: 138.6 G/M2 (ref 43–95)
ECHO LV POSTERIOR WALL DIASTOLIC: 0.96 CM (ref 0.6–0.9)
ECHO LVOT DIAM: 1.8 CM
ECHO PULMONARY ARTERY SYSTOLIC PRESSURE (PASP): 40 MMHG
ECHO RV INTERNAL DIMENSION: 3.18 CM
ECHO TV REGURGITANT MAX VELOCITY: 303.34 CM/S
ECHO TV REGURGITANT PEAK GRADIENT: 36.8 MMHG
EOSINOPHIL # BLD AUTO: 0.3 X10E3/UL (ref 0–0.4)
EOSINOPHIL NFR BLD AUTO: 3 %
ERYTHROCYTE [DISTWIDTH] IN BLOOD BY AUTOMATED COUNT: 14.3 % (ref 12.3–15.4)
GLUCOSE SERPL-MCNC: 93 MG/DL (ref 65–99)
HCT VFR BLD AUTO: 36 % (ref 34–46.6)
HDLC SERPL-MCNC: 46 MG/DL
HGB BLD-MCNC: 11.3 G/DL (ref 11.1–15.9)
IMM GRANULOCYTES # BLD AUTO: 0 X10E3/UL (ref 0–0.1)
IMM GRANULOCYTES NFR BLD AUTO: 0 %
LDLC SERPL CALC-MCNC: 7 MG/DL (ref 0–99)
LYMPHOCYTES # BLD AUTO: 4.7 X10E3/UL (ref 0.7–3.1)
LYMPHOCYTES NFR BLD AUTO: 35 %
MCH RBC QN AUTO: 25.2 PG (ref 26.6–33)
MCHC RBC AUTO-ENTMCNC: 31.4 G/DL (ref 31.5–35.7)
MCV RBC AUTO: 80 FL (ref 79–97)
MONOCYTES # BLD AUTO: 0.7 X10E3/UL (ref 0.1–0.9)
MONOCYTES NFR BLD AUTO: 5 %
NEUTROPHILS # BLD AUTO: 7.7 X10E3/UL (ref 1.4–7)
NEUTROPHILS NFR BLD AUTO: 57 %
PHOSPHATE SERPL-MCNC: 4.6 MG/DL (ref 2.5–4.5)
PLATELET # BLD AUTO: 261 X10E3/UL (ref 150–379)
POTASSIUM SERPL-SCNC: 4.7 MMOL/L (ref 3.5–5.2)
RBC # BLD AUTO: 4.48 X10E6/UL (ref 3.77–5.28)
SODIUM SERPL-SCNC: 147 MMOL/L (ref 134–144)
TRIGL SERPL-MCNC: 121 MG/DL (ref 0–149)
VLDLC SERPL CALC-MCNC: 24 MG/DL (ref 5–40)
WBC # BLD AUTO: 13.5 X10E3/UL (ref 3.4–10.8)

## 2019-01-29 NOTE — PROGRESS NOTES
[facility] [place]: Billy Stafford  [phone]    History of Present Illness:   Ms. Naomie Givens is a 75 yo F with chronic kidney disease followed by Dr. Annmarie Catalan, type 2 diabetes, essential hypertension, history of PAD with complicated gangrene s/p amputation of her left leg in 2016 with an AKA, history of tobacco use, quit many years ago, referred by Dr. Colby Ponce for cardiac evaluation. She is here due to an abnormal EKG. She denies any prior cardiac history. Overall, she has limited mobility due to her AKA, but denies any chest pain, shortness of breath, palpitations, lightheadedness, dizziness or syncope. She is compensated on exam with clear lungs and no lower extremity edema on the right. Her blood pressure is 118/56 with a heart rate of 91. I reviewed personally her EKG from yesterday and it was normal sinus rhythm, but there was a Q-wave in V1 and V2 and poor R-wave progression, as well as inferior Q-waves, possible prior myocardial infarction with no ST depressions or elevation. Soc hx. Quit tobacco 27 yo. Fam hx. No early CAD. Assessment and Plan:   1. Abnormal EKG. Concerning for possible cardiac event in the past.  Although she is not having any alanna symptoms she has multiple risk factors and limited functional capacity; will obtain an echocardiogram for further evaluation. If this is abnormal, consider preventative therapies like aspirin and cholesterol medication and we discussed this. 2. Essential hypertension. Blood pressure is controlled. 3. Type 2 diabetes. 4. PVD, status post left sided AKA. 5. History of tobacco use. 6. Chronic kidney disease. Followed by Dr. Annmarie Catalan. She  has a past medical history of Diabetes ([address] Hypertension, and Peripheral vascular disease due to secondary diabetes ([facility] (1/28/2019). All other systems negative except as above.      PE  Vitals:    01/29/19 1440   BP: 118/56   Pulse: 91   Resp: 19   SpO2: 98%   Weight: 150 lb (68 kg) Height: 5' 5.5\" (1.664 m)    Body mass index is 24.58 kg/m². General appearance - alert, well appearing, and in no distress  Mental status - affect appropriate to mood  Eyes - sclera anicteric, moist mucous membranes  Neck - supple, no JVD  Chest - clear to auscultation, no wheezes, rales or rhonchi  Heart - normal rate, regular rhythm, normal S1, S2, no murmurs, rubs, clicks or gallops  Abdomen - soft, nontender, nondistended, no masses or organomegaly  Neurological - no focal deficit  Musculoskeletal - no muscular tenderness noted, normal strength  Extremities - Left AKA, right trace edema      Recent Labs:  Lab Results   Component Value Date/Time    Cholesterol, total 77 (L) 01/28/2019 09:55 AM    HDL Cholesterol 46 01/28/2019 09:55 AM    LDL, calculated 7 01/28/2019 09:55 AM    Triglyceride 121 01/28/2019 09:55 AM     Lab Results   Component Value Date/Time    Creatinine 1.82 (H) 01/28/2019 09:55 AM     Lab Results   Component Value Date/Time    BUN 43 (H) 01/28/2019 09:55 AM     Lab Results   Component Value Date/Time    Potassium 4.7 01/28/2019 09:55 AM     Lab Results   Component Value Date/Time    Hemoglobin A1c 7.7 (H) 06/20/2017 11:34 AM     Lab Results   Component Value Date/Time    HGB 11.3 01/28/2019 09:55 AM     Lab Results   Component Value Date/Time    PLATELET 716 75/76/8671 09:55 AM       Reviewed:  Past Medical History:   Diagnosis Date    Diabetes ([facility]     Hypertension     Peripheral vascular disease due to secondary diabetes ([facility] 1/28/2019     Social History     Tobacco Use   Smoking Status Former Smoker    Years: 15.00   Smokeless Tobacco Never Used     Social History     Substance and Sexual Activity   Alcohol Use No     No Known Allergies    Current Outpatient Medications   Medication Sig    insulin glargine (LANTUS SOLOSTAR U-100 INSULIN) 100 unit/mL (3 mL) inpn 10 Units by SubCUTAneous route daily.     Insulin Needles, Disposable, (BD ULTRA-FINE SHORT PEN NEEDLE) 31 gauge x 5/16\" ndle Use to test blood sugar on average 3 times per day. Dx code: E11.51 and Z79.4    lancets (PRODIGY TWIST TOP LANCET) 28 gauge misc To measure blood sugar 3-4 times daily. Dx code E 11.51, Z79.4    Blood-Glucose Meter (TRUE METRIX AIR GLUCOSE METER) monitoring kit Use to test blood sugar on average 3 times per day. Dx code: E11.51 and Z79.4    TRUE METRIX GLUCOSE TEST STRIP strip Use to test blood sugar on average 3 times per day. Dx code: E11.51 and Z79.4    cloNIDine HCl (CATAPRES) 0.1 mg tablet TAKE 1 TABLET TWICE DAILY    metoprolol succinate (TOPROL-XL) 50 mg XL tablet TAKE 1 TABLET EVERY DAY    ALCOHOL PREP PADS padm     gum/pva/dental adhesive (SUPER DENTURE ADHESIVE DT)     VITAMIN D3 2,000 unit tab TK 1 T PO QD    amLODIPine (NORVASC) 5 mg tablet TAKE 1 TABLET EVERY DAY    hydrALAZINE (APRESOLINE) 10 mg tablet Take 1 Tab by mouth three (3) times daily.  furosemide (LASIX) 40 mg tablet Take 1 Tab by mouth daily.  gabapentin (NEURONTIN) 100 mg capsule Take 2 Caps by mouth two (2) times a day.  lisinopril (PRINIVIL, ZESTRIL) 20 mg tablet Take 20 mg by mouth daily. No current facility-administered medications for this visit.         Ciro Nageotte, MD  [facility] heart and [facility] [place]  [address] [facility] [address] [place] [address]

## 2019-01-29 NOTE — PROGRESS NOTES
Chief Complaint   Patient presents with    New Patient       1. Have you been to the ER, urgent care clinic since your last visit? Hospitalized since your last visit? No    2. Have you seen or consulted any other health care providers outside of the [address] [place] [address] since your last visit? Include any pap smears or colon screening. No    3) Do you have an Advance Directive on file? no    Patient is accompanied by self I have received verbal consent from Leola Mcburney to discuss any/all medical information while they are present in the room.

## 2019-01-30 ENCOUNTER — TELEPHONE (OUTPATIENT)
Dept: INTERNAL MEDICINE CLINIC | Age: 74
End: 2019-01-30

## 2019-01-30 PROBLEM — D72.829 LEUKOCYTOSIS: Status: ACTIVE | Noted: 2019-01-30

## 2019-01-30 NOTE — PROGRESS NOTES
Lab results show very low cholesterol, perhaps explaining why not on statin despite history of PVC. Called lab to see if this could be verified, but has an extra fee. Would instead consider running repeat with next blood draw. CBC shows persistent elevation in WBC. No prior differential or other labs available. With both lymphs and neutrophils elevated, less likley to represent primary blood disorder, suspect may be inflammation related. Will see if there are any CBC obtained at Dr. Hakeem Roberto office to compare to. No immediate changes.    
Erik Lopez MD

## 2019-01-30 NOTE — TELEPHONE ENCOUNTER
Please call and request labs and most recent office report from Dr. Aidan Hidalgo nephrologist (most interested in lipid and CBC result, any from last 5 years).      Thanks  Angela Callejas MD

## 2019-02-01 NOTE — TELEPHONE ENCOUNTER
Received back fax from Dr. Tuan Murcia office. No CBC nor lipid available. Provided chart of recent visits and creatinine levels. Plan to repeat CBC with peripheral smear in ~ 2 months.      Tenisha Meredith MD

## 2019-03-11 DIAGNOSIS — E55.9 VITAMIN D DEFICIENCY: ICD-10-CM

## 2019-03-11 DIAGNOSIS — M81.0 AGE-RELATED OSTEOPOROSIS WITHOUT CURRENT PATHOLOGICAL FRACTURE: ICD-10-CM

## 2019-03-11 RX ORDER — OMEGA-3S/DHA/EPA/FISH OIL/D3 300MG-1000
CAPSULE ORAL
Qty: 90 TAB | Refills: 3 | Status: SHIPPED | OUTPATIENT
Start: 2019-03-11 | End: 2020-04-22

## 2019-03-11 NOTE — TELEPHONE ENCOUNTER
----- Message from Steven Sathya sent at 3/11/2019 10:50 AM EDT -----  Regarding: Dr Madison Donato (p) [phone], pt said her insulin went  Her [facility] ,  And was charged  ,  For $125 which over shawna her acct by $75 ,which she did not request refill   she is going to send them a return slip so they can refund her account. She did not request this order for insulin she already has enough insulin, so she is not sure how this happened.     She only uses [facility] pharmacy for her pressure and pain pills and small short needs and script for her machine  from  [facility] [employer] only, everything else is thru [facility], please make a note in her chart for any  future  rx request.

## 2019-03-18 DIAGNOSIS — I10 ESSENTIAL HYPERTENSION: ICD-10-CM

## 2019-03-18 RX ORDER — HYDRALAZINE HYDROCHLORIDE 10 MG/1
TABLET, FILM COATED ORAL
Qty: 270 TAB | Refills: 3 | Status: SHIPPED | OUTPATIENT
Start: 2019-03-18 | End: 2020-07-01

## 2019-04-25 DIAGNOSIS — I10 ESSENTIAL HYPERTENSION: ICD-10-CM

## 2019-04-25 RX ORDER — FUROSEMIDE 40 MG/1
TABLET ORAL
Qty: 90 TAB | Refills: 3 | Status: SHIPPED | OUTPATIENT
Start: 2019-04-25 | End: 2020-06-01

## 2019-05-17 ENCOUNTER — OFFICE VISIT (OUTPATIENT)
Dept: INTERNAL MEDICINE CLINIC | Age: 74
End: 2019-05-17

## 2019-05-17 VITALS
HEIGHT: 66 IN | HEART RATE: 80 BPM | RESPIRATION RATE: 16 BRPM | OXYGEN SATURATION: 99 % | WEIGHT: 145.6 LBS | TEMPERATURE: 98.4 F | SYSTOLIC BLOOD PRESSURE: 159 MMHG | BODY MASS INDEX: 23.4 KG/M2 | DIASTOLIC BLOOD PRESSURE: 69 MMHG

## 2019-05-17 DIAGNOSIS — E11.51 CONTROLLED TYPE 2 DIABETES MELLITUS WITH DIABETIC PERIPHERAL ANGIOPATHY WITHOUT GANGRENE, WITH LONG-TERM CURRENT USE OF INSULIN (HCC): ICD-10-CM

## 2019-05-17 DIAGNOSIS — N18.30 STAGE 3 CHRONIC KIDNEY DISEASE (HCC): ICD-10-CM

## 2019-05-17 DIAGNOSIS — Z79.4 CONTROLLED TYPE 2 DIABETES MELLITUS WITH DIABETIC PERIPHERAL ANGIOPATHY WITHOUT GANGRENE, WITH LONG-TERM CURRENT USE OF INSULIN (HCC): ICD-10-CM

## 2019-05-17 DIAGNOSIS — I10 ESSENTIAL HYPERTENSION: ICD-10-CM

## 2019-05-17 DIAGNOSIS — D72.829 LEUKOCYTOSIS, UNSPECIFIED TYPE: ICD-10-CM

## 2019-05-17 DIAGNOSIS — E11.42 DIABETIC POLYNEUROPATHY ASSOCIATED WITH TYPE 2 DIABETES MELLITUS (HCC): ICD-10-CM

## 2019-05-17 DIAGNOSIS — E11.21 DIABETIC NEPHROPATHY ASSOCIATED WITH TYPE 2 DIABETES MELLITUS (HCC): Primary | ICD-10-CM

## 2019-05-17 NOTE — PROGRESS NOTES
General follow-up     HPI:   Meryle Mark is a 74 y.o. female, she presents today for:     Overall doing well at this point. Diarrhea has resolved. [facility] Feeling well, no new dizzines, no new swelling. No change in breathing. Continues to follow with pulm for COPD    ROS:   10 point review of systems negative except as noted in HPI or below:    Medications used for acute illness: none    Current Outpatient Medications on File Prior to Visit   Medication Sig    furosemide (LASIX) 40 mg tablet TAKE 1 TABLET EVERY DAY    hydrALAZINE (APRESOLINE) 10 mg tablet TAKE 1 TABLET THREE TIMES DAILY    VITAMIN D3 2,000 unit tab TAKE 1 TABLET BY MOUTH EVERY DAY    insulin glargine (LANTUS SOLOSTAR U-100 INSULIN) 100 unit/mL (3 mL) inpn 10 Units by SubCUTAneous route daily.  Insulin Needles, Disposable, (BD ULTRA-FINE SHORT PEN NEEDLE) 31 gauge x 5/16\" ndle Use to test blood sugar on average 3 times per day. Dx code: E11.51 and Z79.4    lancets (PRODIGY TWIST TOP LANCET) 28 gauge misc To measure blood sugar 3-4 times daily. Dx code E 11.51, Z79.4    Blood-Glucose Meter (TRUE METRIX AIR GLUCOSE METER) monitoring kit Use to test blood sugar on average 3 times per day. Dx code: E11.51 and Z79.4    TRUE METRIX GLUCOSE TEST STRIP strip Use to test blood sugar on average 3 times per day. Dx code: E11.51 and Z79.4    cloNIDine HCl (CATAPRES) 0.1 mg tablet TAKE 1 TABLET TWICE DAILY    metoprolol succinate (TOPROL-XL) 50 mg XL tablet TAKE 1 TABLET EVERY DAY    gum/pva/dental adhesive (SUPER DENTURE ADHESIVE DT)     amLODIPine (NORVASC) 5 mg tablet TAKE 1 TABLET EVERY DAY    gabapentin (NEURONTIN) 100 mg capsule Take 2 Caps by mouth two (2) times a day.  lisinopril (PRINIVIL, ZESTRIL) 20 mg tablet Take 20 mg by mouth daily.  ALCOHOL PREP PADS padm      No current facility-administered medications on file prior to visit.         No Known Allergies    PMH/PSH/FH: reviewed and updated    Sochx:   reports that she has quit smoking. She quit after 15.00 years of use. She has never used smokeless tobacco. She reports that she does not drink alcohol or use drugs. PE:  Blood pressure 159/69, pulse 80, temperature 98.4 °F (36.9 °C), temperature source Oral, resp. rate 16, height 5' 5.5\" (1.664 m), weight 145 lb 9.6 oz (66 kg), SpO2 99 %. Body mass index is 23.86 kg/m². Physical Exam   Constitutional: She is oriented to person, place, and time. She appears well-developed and well-nourished. No distress. HENT:   Head: Normocephalic. Mouth/Throat: Oropharynx is clear and moist.   Eyes: Conjunctivae and EOM are normal.   Neck: Neck supple. No thyromegaly present. Cardiovascular: Normal rate, regular rhythm and normal heart sounds. Pulmonary/Chest: Effort normal and breath sounds normal.   Abdominal: Soft. She exhibits no distension. Neurological: She is alert and oriented to person, place, and time. Skin: Skin is warm and dry. Capillary refill takes less than 2 seconds. Nursing note and vitals reviewed. Labs:  See addendum    A/P  Mortimer Diesel was seen today for had concerns including Diarrhea (off and on for 2 weeks, has not had diarrhea since 05/15/2019, ). .  The diagnosis and plan was discussed including:        ICD-10-CM ICD-9-CM    1. Diabetic nephropathy associated with type 2 diabetes mellitus ([facility]) E11.21 250.40 HEMOGLOBIN A1C WITH EAG     583.81 RENAL FUNCTION PANEL      CBC WITH AUTOMATED DIFF   2. Diabetic polyneuropathy associated with type 2 diabetes mellitus ([facility]) E11.42 250.60 HEMOGLOBIN A1C WITH EAG     357.2 RENAL FUNCTION PANEL      CBC WITH AUTOMATED DIFF   3. Stage 3 chronic kidney disease ([facility]) N18.3 585.3 HEMOGLOBIN A1C WITH EAG      RENAL FUNCTION PANEL      CBC WITH AUTOMATED DIFF   4.  Controlled type 2 diabetes mellitus with diabetic peripheral angiopathy without gangrene, with long-term current use of insulin ([facility]) E11.51 250.70 HEMOGLOBIN A1C WITH EAG    Z79.4 443.81 RENAL FUNCTION PANEL     V58.67 CBC WITH AUTOMATED DIFF   5. Essential hypertension I10 401.9 HEMOGLOBIN A1C WITH EAG      RENAL FUNCTION PANEL      CBC WITH AUTOMATED DIFF   6. Leukocytosis, unspecified type D72.829 288.60 HEMOGLOBIN A1C WITH EAG      RENAL FUNCTION PANEL      CBC WITH AUTOMATED DIFF     With blood sugars running low. Plan to stop insulin and monitor over next 6 weeks. Discussed goal blood sugars from . Patient wants to try weaning off gabapentin. Notes that she is not have any burning pain at this time in her feet. Discussed slow taper (see patient instructions)      - I advised her to call back or return to office if symptoms worsen/change/persist.  - She was given AVS and expressed understanding with the diagnosis and plan as discussed. Follow-up and Dispositions    · Return in about 6 weeks (around 7/1/2019) for medicare wellness and follow-up elevated blood pressure.

## 2019-05-17 NOTE — PATIENT INSTRUCTIONS
- Stop insulin. Goal blood sugars are     - reduce gabapentin to 1 tablet 2 times daily for 2 weeks. If feeling well can then go to 1 tablet 1 time daily for 2 weeks, then stop.

## 2019-05-17 NOTE — PROGRESS NOTES
RM 1    No chief complaint on file. 1. Have you been to the ER, urgent care clinic since your last visit? Hospitalized since your last visit, No      2. Have you seen or consulted any other health care providers outside of the [address] [place] [address] since your last visit? Include any pap smears or colon screening.  No      Health Maintenance Due   Topic Date Due    DTaP/Tdap/Td series (1 - Tdap) 07/18/1964    Shingrix Vaccine Age 50> (1 of 2) 07/18/1993   Patient has not had shingles vaccination     Learning Assessment 6/20/2017   PRIMARY LEARNER Patient   PRIMARY LANGUAGE ENGLISH   LEARNER PREFERENCE PRIMARY READING   ANSWERED BY patient   RELATIONSHIP SELF

## 2019-05-18 LAB
ALBUMIN SERPL-MCNC: 3.7 G/DL (ref 3.5–4.8)
BASOPHILS # BLD AUTO: 0 X10E3/UL (ref 0–0.2)
BASOPHILS NFR BLD AUTO: 0 %
BUN SERPL-MCNC: 25 MG/DL (ref 8–27)
BUN/CREAT SERPL: 18 (ref 12–28)
CALCIUM SERPL-MCNC: 8.8 MG/DL (ref 8.7–10.3)
CHLORIDE SERPL-SCNC: 109 MMOL/L (ref 96–106)
CO2 SERPL-SCNC: 19 MMOL/L (ref 20–29)
CREAT SERPL-MCNC: 1.42 MG/DL (ref 0.57–1)
EOSINOPHIL # BLD AUTO: 0.3 X10E3/UL (ref 0–0.4)
EOSINOPHIL NFR BLD AUTO: 2 %
ERYTHROCYTE [DISTWIDTH] IN BLOOD BY AUTOMATED COUNT: 14.8 % (ref 12.3–15.4)
EST. AVERAGE GLUCOSE BLD GHB EST-MCNC: 131 MG/DL
GLUCOSE SERPL-MCNC: 104 MG/DL (ref 65–99)
HBA1C MFR BLD: 6.2 % (ref 4.8–5.6)
HCT VFR BLD AUTO: 34.4 % (ref 34–46.6)
HGB BLD-MCNC: 10.7 G/DL (ref 11.1–15.9)
IMM GRANULOCYTES # BLD AUTO: 0 X10E3/UL (ref 0–0.1)
IMM GRANULOCYTES NFR BLD AUTO: 0 %
LYMPHOCYTES # BLD AUTO: 4.2 X10E3/UL (ref 0.7–3.1)
LYMPHOCYTES NFR BLD AUTO: 34 %
MCH RBC QN AUTO: 25.3 PG (ref 26.6–33)
MCHC RBC AUTO-ENTMCNC: 31.1 G/DL (ref 31.5–35.7)
MCV RBC AUTO: 81 FL (ref 79–97)
MONOCYTES # BLD AUTO: 0.5 X10E3/UL (ref 0.1–0.9)
MONOCYTES NFR BLD AUTO: 4 %
NEUTROPHILS # BLD AUTO: 7.4 X10E3/UL (ref 1.4–7)
NEUTROPHILS NFR BLD AUTO: 60 %
PHOSPHATE SERPL-MCNC: 4.4 MG/DL (ref 2.5–4.5)
PLATELET # BLD AUTO: 257 X10E3/UL (ref 150–379)
POTASSIUM SERPL-SCNC: 3.9 MMOL/L (ref 3.5–5.2)
RBC # BLD AUTO: 4.23 X10E6/UL (ref 3.77–5.28)
SODIUM SERPL-SCNC: 144 MMOL/L (ref 134–144)
WBC # BLD AUTO: 12.4 X10E3/UL (ref 3.4–10.8)

## 2019-05-20 NOTE — PROGRESS NOTES
A1C in goal range, kidney filtration is looking good/stable. Mild anemia not significantly different from prior.    Lab letter generated  Donna Mack MD

## 2019-07-04 DIAGNOSIS — I10 ESSENTIAL HYPERTENSION: ICD-10-CM

## 2019-07-04 RX ORDER — METOPROLOL SUCCINATE 50 MG/1
TABLET, EXTENDED RELEASE ORAL
Qty: 90 TAB | Refills: 3 | Status: SHIPPED | OUTPATIENT
Start: 2019-07-04 | End: 2019-07-08 | Stop reason: SDUPTHER

## 2019-07-04 RX ORDER — AMLODIPINE BESYLATE 5 MG/1
TABLET ORAL
Qty: 90 TAB | Refills: 3 | Status: SHIPPED | OUTPATIENT
Start: 2019-07-04 | End: 2020-04-28

## 2019-07-08 ENCOUNTER — OFFICE VISIT (OUTPATIENT)
Dept: INTERNAL MEDICINE CLINIC | Age: 74
End: 2019-07-08

## 2019-07-08 VITALS
TEMPERATURE: 97.8 F | HEIGHT: 66 IN | HEART RATE: 84 BPM | OXYGEN SATURATION: 98 % | DIASTOLIC BLOOD PRESSURE: 68 MMHG | BODY MASS INDEX: 22.66 KG/M2 | RESPIRATION RATE: 16 BRPM | WEIGHT: 141 LBS | SYSTOLIC BLOOD PRESSURE: 139 MMHG

## 2019-07-08 DIAGNOSIS — E11.51 CONTROLLED TYPE 2 DIABETES MELLITUS WITH DIABETIC PERIPHERAL ANGIOPATHY WITHOUT GANGRENE, WITH LONG-TERM CURRENT USE OF INSULIN (HCC): Primary | ICD-10-CM

## 2019-07-08 DIAGNOSIS — Z79.4 CONTROLLED TYPE 2 DIABETES MELLITUS WITH DIABETIC PERIPHERAL ANGIOPATHY WITHOUT GANGRENE, WITH LONG-TERM CURRENT USE OF INSULIN (HCC): Primary | ICD-10-CM

## 2019-07-08 DIAGNOSIS — I10 ESSENTIAL HYPERTENSION: ICD-10-CM

## 2019-07-08 RX ORDER — METOPROLOL SUCCINATE 50 MG/1
50 TABLET, EXTENDED RELEASE ORAL DAILY
Qty: 30 TAB | Refills: 0 | Status: SHIPPED | OUTPATIENT
Start: 2019-07-08 | End: 2020-04-28

## 2019-07-08 RX ORDER — GLIMEPIRIDE 1 MG/1
1 TABLET ORAL
Qty: 90 TAB | Refills: 1 | Status: SHIPPED | OUTPATIENT
Start: 2019-07-08 | End: 2019-09-10

## 2019-07-08 NOTE — PROGRESS NOTES
RM 1    Chief Complaint   Patient presents with    Follow-up     3 month follow up diabetes      1. Have you been to the ER, urgent care clinic since your last visit? Hospitalized since your last visit? No    2. Have you seen or consulted any other health care providers outside of the [address] [place] [address] since your last visit? Include any pap smears or colon screening.  No    Health Maintenance Due   Topic Date Due    DTaP/Tdap/Td series (1 - Tdap) 07/18/1964    Shingrix Vaccine Age 50> (1 of 2) 07/18/1993    MICROALBUMIN Q1  07/26/2019    MEDICARE YEARLY EXAM  07/27/2019    FOOT EXAM Q1  07/27/2019     patiet reports has not had TDAP , or shingles     Learning Assessment 6/20/2017   PRIMARY LEARNER Patient   PRIMARY LANGUAGE ENGLISH   LEARNER PREFERENCE PRIMARY READING   ANSWERED BY patient   RELATIONSHIP SELF

## 2019-07-08 NOTE — PROGRESS NOTES
FLOWER Jones is a 74 y.o. female, she presents today for: At last visit blood sugar running low. Stopped insulin. Not currently on any medications for diabetes. Breakfast: meat, egg, toast, oatmeal. v8 juice and coffee. Sometimes skip lunch (often)  Dinner: balane of meat, start and food. Since last visit insulin has been stopped as well as tapering off of gabapentin. PMH/PSH: reviewed and updated  Sochx:  reports that she has quit smoking. She quit after 15.00 years of use. She has never used smokeless tobacco. She reports that she does not drink alcohol or use drugs. Famhx: reviewed and updated     All: No Known Allergies  Med:   Current Outpatient Medications   Medication Sig    amLODIPine (NORVASC) 5 mg tablet TAKE 1 TABLET EVERY DAY    metoprolol succinate (TOPROL-XL) 50 mg XL tablet TAKE 1 TABLET EVERY DAY    furosemide (LASIX) 40 mg tablet TAKE 1 TABLET EVERY DAY    hydrALAZINE (APRESOLINE) 10 mg tablet TAKE 1 TABLET THREE TIMES DAILY    VITAMIN D3 2,000 unit tab TAKE 1 TABLET BY MOUTH EVERY DAY    lancets (PRODIGY TWIST TOP LANCET) 28 gauge misc To measure blood sugar 3-4 times daily. Dx code E 11.51, Z79.4    Blood-Glucose Meter (TRUE METRIX AIR GLUCOSE METER) monitoring kit Use to test blood sugar on average 3 times per day. Dx code: E11.51 and Z79.4    TRUE METRIX GLUCOSE TEST STRIP strip Use to test blood sugar on average 3 times per day. Dx code: E11.51 and Z79.4    cloNIDine HCl (CATAPRES) 0.1 mg tablet TAKE 1 TABLET TWICE DAILY    ALCOHOL PREP PADS padm     gum/pva/dental adhesive (SUPER DENTURE ADHESIVE DT)     lisinopril (PRINIVIL, ZESTRIL) 20 mg tablet Take 20 mg by mouth daily.  insulin glargine (LANTUS SOLOSTAR U-100 INSULIN) 100 unit/mL (3 mL) inpn 10 Units by SubCUTAneous route daily.  Insulin Needles, Disposable, (BD ULTRA-FINE SHORT PEN NEEDLE) 31 gauge x 5/16\" ndle Use to test blood sugar on average 3 times per day.  Dx code: E11.51 and Z79.4    gabapentin (NEURONTIN) 100 mg capsule Take 2 Caps by mouth two (2) times a day. No current facility-administered medications for this visit. Review of Systems   Constitutional: Negative for chills, fever and malaise/fatigue. Respiratory: Negative for shortness of breath. Cardiovascular: Negative for chest pain. PE:  Blood pressure 139/68, pulse 84, temperature 97.8 °F (36.6 °C), temperature source Oral, resp. rate 16, height 5' 5.5\" (1.664 m), weight 141 lb (64 kg), SpO2 98 %. Body mass index is 23.11 kg/m². Physical Exam   Constitutional: She is oriented to person, place, and time. She appears well-developed and well-nourished. No distress. HENT:   Head: Normocephalic. Mouth/Throat: Oropharynx is clear and moist.   Eyes: Conjunctivae and EOM are normal.   Neck: Neck supple. No thyromegaly present. Cardiovascular: Normal rate, regular rhythm and normal heart sounds. Pulmonary/Chest: Effort normal and breath sounds normal.   Abdominal: Soft. She exhibits no distension. Neurological: She is alert and oriented to person, place, and time. Skin: Skin is warm and dry. Capillary refill takes less than 2 seconds. Nursing note and vitals reviewed. Labs:   No results found for any visits on 07/08/19. A/P:  74 y.o. female    ICD-10-CM ICD-9-CM    1. Controlled type 2 diabetes mellitus with diabetic peripheral angiopathy without gangrene, with long-term current use of insulin (HCC) E11.51 250.70 glimepiride (AMARYL) 1 mg tablet    Z79.4 443.81      V58.67    2. Essential hypertension I10 401.9 metoprolol succinate (TOPROL-XL) 50 mg XL tablet     Blood sugars up some since stopping insulin seem to be diet related. To add low dose glimepiride (per patient preference due to cost not pursuing newer diabetes medications at this time). Follow-up A1C at 3 months. HTN: Blood pressure well controlled.      - She was given AVS and expressed understanding with the diagnosis and plan as discussed.     Future Appointments   Date Time Provider Inge Aburto   9/10/2019 11:00 AM Alicia Potter MD [facility]

## 2019-07-15 DIAGNOSIS — I10 ESSENTIAL HYPERTENSION: ICD-10-CM

## 2019-07-16 RX ORDER — CLONIDINE HYDROCHLORIDE 0.1 MG/1
TABLET ORAL
Qty: 180 TAB | Refills: 3 | Status: SHIPPED | OUTPATIENT
Start: 2019-07-16 | End: 2020-04-27 | Stop reason: SDUPTHER

## 2019-08-13 ENCOUNTER — TELEPHONE (OUTPATIENT)
Dept: INTERNAL MEDICINE CLINIC | Age: 74
End: 2019-08-13

## 2019-08-13 DIAGNOSIS — G62.9 NEUROPATHY: ICD-10-CM

## 2019-08-13 NOTE — TELEPHONE ENCOUNTER
Pt having pain from her amputated leg. Pt states Dr Jaycee Thayer previously weaned her off the SH[place]ON[place]E. However, due to the 'phantom pain' she's having from the amputated leg, pt wants to know if she can go back on the Gabepentin. Pt has about 'half bottle' left of previous Rx, please let pt know if she can resume Rx and if so, how much she should take.   Please call pt - ph#: [phone]

## 2019-08-15 RX ORDER — GABAPENTIN 100 MG/1
100 CAPSULE ORAL 2 TIMES DAILY
Qty: 180 CAP | Refills: 1 | OUTPATIENT
Start: 2019-08-15 | End: 2019-11-07 | Stop reason: SDUPTHER

## 2019-08-15 NOTE — TELEPHONE ENCOUNTER
Please advise patient: I would suggest starting at 1 tablet 1 time a day. Please call  prescription into pharmacy to reflect this. (signed with this encounter).      Thanks  Ronnie Alfredo MD

## 2019-08-16 NOTE — TELEPHONE ENCOUNTER
Writer spoke to patient after verifying name and . Informed patient of Dr. Wili Mccloud recommendations. Patient voices understanding. Writer called in rx into pharmacy, patient aware. No further concerns.

## 2019-09-10 ENCOUNTER — OFFICE VISIT (OUTPATIENT)
Dept: INTERNAL MEDICINE CLINIC | Age: 74
End: 2019-09-10

## 2019-09-10 VITALS
WEIGHT: 140.8 LBS | DIASTOLIC BLOOD PRESSURE: 67 MMHG | RESPIRATION RATE: 17 BRPM | TEMPERATURE: 97.8 F | SYSTOLIC BLOOD PRESSURE: 129 MMHG | HEIGHT: 66 IN | OXYGEN SATURATION: 98 % | HEART RATE: 73 BPM | BODY MASS INDEX: 22.63 KG/M2

## 2019-09-10 DIAGNOSIS — I10 ESSENTIAL HYPERTENSION: ICD-10-CM

## 2019-09-10 DIAGNOSIS — N18.30 STAGE 3 CHRONIC KIDNEY DISEASE (HCC): ICD-10-CM

## 2019-09-10 DIAGNOSIS — R79.89 LOW VITAMIN D LEVEL: ICD-10-CM

## 2019-09-10 DIAGNOSIS — E13.51 PERIPHERAL VASCULAR DISEASE DUE TO SECONDARY DIABETES (HCC): ICD-10-CM

## 2019-09-10 DIAGNOSIS — K59.09 OTHER CONSTIPATION: ICD-10-CM

## 2019-09-10 DIAGNOSIS — E11.21 DIABETIC NEPHROPATHY ASSOCIATED WITH TYPE 2 DIABETES MELLITUS (HCC): ICD-10-CM

## 2019-09-10 DIAGNOSIS — E11.42 DIABETIC POLYNEUROPATHY ASSOCIATED WITH TYPE 2 DIABETES MELLITUS (HCC): ICD-10-CM

## 2019-09-10 DIAGNOSIS — E11.51 CONTROLLED TYPE 2 DIABETES MELLITUS WITH DIABETIC PERIPHERAL ANGIOPATHY WITHOUT GANGRENE, WITH LONG-TERM CURRENT USE OF INSULIN (HCC): Primary | ICD-10-CM

## 2019-09-10 DIAGNOSIS — Z00.00 MEDICARE ANNUAL WELLNESS VISIT, SUBSEQUENT: ICD-10-CM

## 2019-09-10 DIAGNOSIS — Z23 ENCOUNTER FOR IMMUNIZATION: ICD-10-CM

## 2019-09-10 DIAGNOSIS — Z79.4 CONTROLLED TYPE 2 DIABETES MELLITUS WITH DIABETIC PERIPHERAL ANGIOPATHY WITHOUT GANGRENE, WITH LONG-TERM CURRENT USE OF INSULIN (HCC): Primary | ICD-10-CM

## 2019-09-10 LAB
ALBUMIN UR QL STRIP: 10 MG/L
CREATININE, URINE POC: 50 MG/DL
MICROALBUMIN/CREAT RATIO POC: NORMAL MG/G

## 2019-09-10 RX ORDER — GLIPIZIDE 5 MG/1
2.5 TABLET ORAL DAILY
Qty: 45 TAB | Refills: 3 | Status: SHIPPED | OUTPATIENT
Start: 2019-09-10 | End: 2020-05-06 | Stop reason: SDUPTHER

## 2019-09-10 RX ORDER — POLYETHYLENE GLYCOL 3350 17 G/17G
17 POWDER, FOR SOLUTION ORAL DAILY
Qty: 850 G | Refills: 0 | Status: SHIPPED | OUTPATIENT
Start: 2019-09-10 | End: 2020-05-06

## 2019-09-10 NOTE — PROGRESS NOTES
This is the Subsequent Medicare Annual Wellness Exam, performed 12 months or more after the Initial AWV or the last Subsequent AWV    I have reviewed the patient's medical history in detail and updated the computerized patient record. History     Past Medical History:   Diagnosis Date    Diabetes ([facility] [place] [address] [facility]     Hypertension     Peripheral vascular disease due to secondary diabetes ([facility] [place] [address] 1/28/2019      Past Surgical History:   Procedure Laterality Date    HX ABOVE KNEE AMPUTATION  02/06/2017    HX BELOW KNEE AMPUTATION      HX GYN      hysterectomy     Current Outpatient Medications   Medication Sig Dispense Refill    gabapentin (NEURONTIN) 100 mg capsule Take 1 Cap by mouth two (2) times a day. Max Daily Amount: 200 mg. 180 Cap 1    cloNIDine HCl (CATAPRES) 0.1 mg tablet TAKE 1 TABLET TWICE DAILY 180 Tab 3    metoprolol succinate (TOPROL-XL) 50 mg XL tablet Take 1 Tab by mouth daily. 30 Tab 0    glimepiride (AMARYL) 1 mg tablet Take 1 Tab by mouth Daily (before breakfast). 90 Tab 1    amLODIPine (NORVASC) 5 mg tablet TAKE 1 TABLET EVERY DAY 90 Tab 3    furosemide (LASIX) 40 mg tablet TAKE 1 TABLET EVERY DAY 90 Tab 3    hydrALAZINE (APRESOLINE) 10 mg tablet TAKE 1 TABLET THREE TIMES DAILY 270 Tab 3    VITAMIN D3 2,000 unit tab TAKE 1 TABLET BY MOUTH EVERY DAY 90 Tab 3    Insulin Needles, Disposable, (BD ULTRA-FINE SHORT PEN NEEDLE) 31 gauge x 5/16\" ndle Use to test blood sugar on average 3 times per day. Dx code: E11.51 and Z79.4 270 Pen Needle 4    lancets (PRODIGY TWIST TOP LANCET) 28 gauge misc To measure blood sugar 3-4 times daily. Dx code E 11.51, Z79.4 200 Lancet 11    Blood-Glucose Meter (TRUE METRIX AIR GLUCOSE METER) monitoring kit Use to test blood sugar on average 3 times per day. Dx code: E11.51 and Z79.4 1 Kit 0    TRUE METRIX GLUCOSE TEST STRIP strip Use to test blood sugar on average 3 times per day.  Dx code: E11.51 and Z79.4 270 Strip 4    ALCOHOL PREP PADS padm       gum/pva/dental adhesive (SUPER DENTURE ADHESIVE DT)       lisinopril (PRINIVIL, ZESTRIL) 20 mg tablet Take 20 mg by mouth daily.  insulin glargine (LANTUS SOLOSTAR U-100 INSULIN) 100 unit/mL (3 mL) inpn 10 Units by SubCUTAneous route daily. 15 mL 5     No Known Allergies  Family History   Problem Relation Age of Onset    Diabetes Mother     Diabetes Father     Heart Disease Father     Diabetes Brother     Diabetes Brother     Diabetes Brother      Social History     Tobacco Use    Smoking status: Former Smoker     Years: 15.00    Smokeless tobacco: Never Used   Substance Use Topics    Alcohol use: No     Patient Active Problem List   Diagnosis Code    Essential hypertension I10    Controlled type 2 diabetes mellitus with circulatory disorder ([facility] [facility] E11.59    Diabetic polyneuropathy associated with type 2 diabetes mellitus (HCC) E11.42    Unilateral AKA, left ([facility] [place] [facility] D30.817    CKD (chronic kidney disease) N18.9    Diabetic nephropathy associated with type 2 diabetes mellitus ([facility] E11.21    Age-related osteoporosis without current pathological fracture M81.0    Peripheral vascular disease due to secondary diabetes ([facility] E13.51    Leukocytosis D72.829       Depression Risk Factor Screening:     3 most recent PHQ Screens 9/10/2019   Little interest or pleasure in doing things Not at all   Feeling down, depressed, irritable, or hopeless Not at all   Total Score PHQ 2 0     Alcohol Risk Factor Screening: You do not drink alcohol or very rarely. Functional Ability and Level of Safety:   Hearing Loss  Hearing is good. Activities of Daily Living  The home contains: grab bars  Patient needs help with:  transportation    Fall Risk  Fall Risk Assessment, last 12 mths 9/10/2019   Able to walk? Yes   Fall in past 12 months? No   Fall with injury? -   Number of falls in past 12 months -   Fall Risk Score -     Abuse Screen  Patient does not feel safe at home.     Cognitive Screening   Evaluation of Cognitive Function:  Has your family/caregiver stated any concerns about your memory: no    Patient Care Team   Patient Care Team:  Margarita Horvath MD as PCP - General (Internal Medicine)  Thao George MD (Surgery)  Stephen Tripp MD (Nephrology)  Les Gonzalez MD as Physician (Cardiology)    Assessment/Plan   Education and counseling provided:  Are appropriate based on today's review and evaluation     Diagnoses and all orders for this visit:    1. Medicare annual wellness visit, subsequent    2. Encounter for immunization  -     ADMIN INFLUENZA VIRUS VAC  -     INFLUENZA VACCINE INACTIVATED (IIV), SUBUNIT, ADJUVANTED, IM    Well woman (non-gyn) exam: history and exam revealed issues as noted below. Cancer screening:   - breast cancer: mammo normal 7/25/2017. Discussed if desires to continue today   - colon cancer: discussed getting c-scope last year, not completed. FIT test provided in march 2018   - hysterectomy no need for cervical cancer screening. Non-smoker stopped more than 40 years ago. Vaccine status: shingrix discussed. Cardiovascular risk: continue bp control. Lipid good despite not being on statin  Bone health: daily vit D supplement. Diet and Exercise: not drinking sugary beverages. Constipation: alternates between constipation and diarrhea. No blood in stool. Just watery. No fiber supplements nor stool softer    DMII: A1C goal < 7.5. March A1C 8.0. . Blood sugars were low while on basal insulin. - change from glimeteride to glipizide, hoping to reduce overnight lows. - unable to take metformin due to ckd. - morning blood sugars running low 70-80s. Rest of the day in the 160s. Sometimes in the afternoon will be 82-83     HTN/CKD: BP at goal. follows with nephrologist Dr. Rebel Tam. Not on statin.  EKG today with nonspecific findings but concerning for piror MI.    - lipid requested   - continue BP control, appreciate assistance of nephrology      Osteoporosis: Nephrologist Dr. Gonzalez Comfrey following for 2nd hyperP. Not a candidate for bisphosphonate due to kidney function.      Low Vitamin D: improved with gentle repletion. In range 3/12/2018. Recheck today.      Unilateral AKA: doing better wiuth prosthesis and overall not stable in gait independently. but still in wheelchair     Abn EKG: followed up with cardiology. Normal echo, no further testing at this time.      Health Maintenance Due   Topic Date Due    DTaP/Tdap/Td series (1 - Tdap) 07/18/1964    Shingrix Vaccine Age 50> (1 of 2) 07/18/1993    MICROALBUMIN Q1  07/26/2019    MEDICARE YEARLY EXAM  07/27/2019    FOOT EXAM Q1  07/27/2019    Influenza Age 9 to Adult  08/01/2019

## 2019-09-10 NOTE — PATIENT INSTRUCTIONS
Bowel clean out. Mix 1 scoop of miralax in 8 ounces of clear liquid and drink 1 dose every hour for 4 hours. Repeat x1 later in the day if stool having large stool production. Medicare Wellness Visit, Female     The best way to live healthy is to have a lifestyle where you eat a well-balanced diet, exercise regularly, limit alcohol use, and quit all forms of tobacco/nicotine, if applicable. Regular preventive services are another way to keep healthy. Preventive services (vaccines, screening tests, monitoring & exams) can help personalize your care plan, which helps you manage your own care. Screening tests can find health problems at the earliest stages, when they are easiest to treat. [facility] follows the current, evidence-based guidelines published by the [facility] Jayesh Niraj ([facility]) when recommending preventive services for our patients. Because we follow these guidelines, sometimes recommendations change over time as research supports it. (For example, mammograms used to be recommended annually. Even though Medicare will still pay for an annual mammogram, the newer guidelines recommend a mammogram every two years for women of average risk.)  Of course, you and your doctor may decide to screen more often for some diseases, based on your risk and your health status. Preventive services for you include:  - Medicare offers their members a free annual wellness visit, which is time for you and your primary care provider to discuss and plan for your preventive service needs. Take advantage of this benefit every year!  -All adults over the age of 72 should receive the recommended pneumonia vaccines. Current USPSTF guidelines recommend a series of two vaccines for the best pneumonia protection.   -All adults should have a flu vaccine yearly and a tetanus vaccine every 10 years.  All adults age 61 and older should receive a shingles vaccine once in their lifetime.    -A bone mass density test is recommended when a woman turns 65 to screen for osteoporosis. This test is only recommended one time, as a screening. Some providers will use this same test as a disease monitoring tool if you already have osteoporosis. -All adults age 38-68 who are overweight should have a diabetes screening test once every three years.   -Other screening tests and preventive services for persons with diabetes include: an eye exam to screen for diabetic retinopathy, a kidney function test, a foot exam, and stricter control over your cholesterol.   -Cardiovascular screening for adults with routine risk involves an electrocardiogram (ECG) at intervals determined by your doctor.   -Colorectal cancer screenings should be done for adults age 54-65 with no increased risk factors for colorectal cancer. There are a number of acceptable methods of screening for this type of cancer. Each test has its own benefits and drawbacks. Discuss with your doctor what is most appropriate for you during your annual wellness visit. The different tests include: colonoscopy (considered the best screening method), a fecal occult blood test, a fecal DNA test, and sigmoidoscopy. -Breast cancer screenings are recommended every other year for women of normal risk, age 54-69.  -Cervical cancer screenings for women over age 72 are only recommended with certain risk factors.   -All adults born between [facility] should be screened once for Hepatitis C.      Here is a list of your current Health Maintenance items (your personalized list of preventive services) with a due date:  Health Maintenance Due   Topic Date Due    DTaP/Tdap/Td  (1 - Tdap) 07/18/1964    Shingles Vaccine (1 of 2) 07/18/1993    Albumin Urine Test  07/26/2019    Annual Well Visit  07/27/2019    Diabetic Foot Care  07/27/2019    Flu Vaccine  08/01/2019

## 2019-09-10 NOTE — PROGRESS NOTES
RM    No chief complaint on file. 1. Have you been to the ER, urgent care clinic since your last visit? Hospitalized since your last visit? No    2. Have you seen or consulted any other health care providers outside of the [address] [place] [address] since your last visit? Include any pap smears or colon screening. No    Health Maintenance Due   Topic Date Due    DTaP/Tdap/Td series (1 - Tdap) 07/18/1964    Shingrix Vaccine Age 50> (1 of 2) 07/18/1993    MICROALBUMIN Q1  07/26/2019    MEDICARE YEARLY EXAM  07/27/2019    FOOT EXAM Q1  07/27/2019    Influenza Age 9 to Adult  08/01/2019       3 most recent PHQ Screens 9/10/2019   Little interest or pleasure in doing things Not at all   Feeling down, depressed, irritable, or hopeless Not at all   Total Score PHQ 2 0     Fall Risk Assessment, last 12 mths 9/10/2019   Able to walk? Yes   Fall in past 12 months? No   Fall with injury? -   Number of falls in past 12 months -   Fall Risk Score -     Abuse Screening Questionnaire 9/10/2019   Do you ever feel afraid of your partner? N   Are you in a relationship with someone who physically or mentally threatens you? N   Is it safe for you to go home?  Y     ADL Assessment 9/10/2019   Feeding yourself No Help Needed   Getting from bed to chair No Help Needed   Getting dressed No Help Needed   Bathing or showering No Help Needed   Walk across the room (includes cane/walker) Help Needed   Using the telphone No Help Needed   Taking your medications No Help Needed   Preparing meals No Help Needed   Managing money (expenses/bills) No Help Needed   Moderately strenuous housework (laundry) Help Needed   Shopping for personal items (toiletries/medicines) Help Needed   Shopping for groceries Help Needed   Driving Help Needed   Climbing a flight of stairs Help Needed   Getting to places beyond walking distances Help Needed       Learning Assessment 6/20/2017   PRIMARY LEARNER Patient   PRIMARY LANGUAGE ENGLISH   LEARNER PREFERENCE PRIMARY READING   ANSWERED BY patient   RELATIONSHIP SELF

## 2019-09-11 LAB
25(OH)D3+25(OH)D2 SERPL-MCNC: 54.8 NG/ML (ref 30–100)
ALBUMIN SERPL-MCNC: 3.7 G/DL (ref 3.5–4.8)
BUN SERPL-MCNC: 43 MG/DL (ref 8–27)
BUN/CREAT SERPL: 25 (ref 12–28)
CALCIUM SERPL-MCNC: 9 MG/DL (ref 8.7–10.3)
CHLORIDE SERPL-SCNC: 114 MMOL/L (ref 96–106)
CO2 SERPL-SCNC: 16 MMOL/L (ref 20–29)
CREAT SERPL-MCNC: 1.71 MG/DL (ref 0.57–1)
ERYTHROCYTE [DISTWIDTH] IN BLOOD BY AUTOMATED COUNT: 13.8 % (ref 12.3–15.4)
EST. AVERAGE GLUCOSE BLD GHB EST-MCNC: 131 MG/DL
GLUCOSE SERPL-MCNC: 85 MG/DL (ref 65–99)
HBA1C MFR BLD: 6.2 % (ref 4.8–5.6)
HCT VFR BLD AUTO: 32.9 % (ref 34–46.6)
HGB BLD-MCNC: 10.8 G/DL (ref 11.1–15.9)
MCH RBC QN AUTO: 26.3 PG (ref 26.6–33)
MCHC RBC AUTO-ENTMCNC: 32.8 G/DL (ref 31.5–35.7)
MCV RBC AUTO: 80 FL (ref 79–97)
PHOSPHATE SERPL-MCNC: 4.2 MG/DL (ref 2.5–4.5)
PLATELET # BLD AUTO: 263 X10E3/UL (ref 150–450)
POTASSIUM SERPL-SCNC: 4.4 MMOL/L (ref 3.5–5.2)
RBC # BLD AUTO: 4.1 X10E6/UL (ref 3.77–5.28)
SODIUM SERPL-SCNC: 145 MMOL/L (ref 134–144)
WBC # BLD AUTO: 12.8 X10E3/UL (ref 3.4–10.8)

## 2019-09-12 PROBLEM — N18.32 CKD STAGE G3B/A2, GFR 30-44 AND ALBUMIN CREATININE RATIO 30-299 MG/G (HCC): Status: ACTIVE | Noted: 2017-06-21

## 2019-09-12 NOTE — PROGRESS NOTES
A1C is stable, remaining in a range of very good blood sugar control. Decreasing glipizide dose appropriate to avoid low blood sugars. Creatinine in baseline range at this time. Continuing to have protein in urine. High risk of future progression of CKD. Cotninue to follow-up periodically with Dr. Yumi Cunningham. No additional changed in medication at this time.    Lab letter generated

## 2019-11-07 DIAGNOSIS — G62.9 NEUROPATHY: ICD-10-CM

## 2019-11-07 RX ORDER — GABAPENTIN 100 MG/1
100 CAPSULE ORAL 2 TIMES DAILY
Qty: 180 CAP | Refills: 1 | Status: SHIPPED | OUTPATIENT
Start: 2019-11-07 | End: 2020-05-06 | Stop reason: SDUPTHER

## 2019-11-07 NOTE — TELEPHONE ENCOUNTER
Reviewed , last refill of medication was in March of 2019. Through [facility]. I have sent in E-script. Please try to find and destroy paper script.      Ameena Dias MD

## 2019-11-07 NOTE — TELEPHONE ENCOUNTER
Patient had reported she never picked up the last prescription for gabapentin. No documentation that patient was supposed to  a prescription. Prescription not up front. Unsure what happened to prescription. . Patient reports has no knowledge of the whereabouts of the prescription. Dr. Judith Beebe reviewed  and sent in prescription for patient.  Patient advised prescription sent

## 2019-11-19 DIAGNOSIS — Z79.4 CONTROLLED TYPE 2 DIABETES MELLITUS WITH DIABETIC PERIPHERAL ANGIOPATHY WITHOUT GANGRENE, WITH LONG-TERM CURRENT USE OF INSULIN (HCC): ICD-10-CM

## 2019-11-19 DIAGNOSIS — E11.51 CONTROLLED TYPE 2 DIABETES MELLITUS WITH DIABETIC PERIPHERAL ANGIOPATHY WITHOUT GANGRENE, WITH LONG-TERM CURRENT USE OF INSULIN (HCC): ICD-10-CM

## 2020-02-11 DIAGNOSIS — E11.51 CONTROLLED TYPE 2 DIABETES MELLITUS WITH DIABETIC PERIPHERAL ANGIOPATHY WITHOUT GANGRENE, WITH LONG-TERM CURRENT USE OF INSULIN (HCC): ICD-10-CM

## 2020-02-11 DIAGNOSIS — Z79.4 CONTROLLED TYPE 2 DIABETES MELLITUS WITH DIABETIC PERIPHERAL ANGIOPATHY WITHOUT GANGRENE, WITH LONG-TERM CURRENT USE OF INSULIN (HCC): ICD-10-CM

## 2020-04-22 DIAGNOSIS — E55.9 VITAMIN D DEFICIENCY: ICD-10-CM

## 2020-04-22 DIAGNOSIS — M81.0 AGE-RELATED OSTEOPOROSIS WITHOUT CURRENT PATHOLOGICAL FRACTURE: ICD-10-CM

## 2020-04-22 RX ORDER — OMEGA-3S/DHA/EPA/FISH OIL/D3 300MG-1000
CAPSULE ORAL
Qty: 90 TAB | Refills: 1 | Status: SHIPPED | OUTPATIENT
Start: 2020-04-22 | End: 2021-08-29

## 2020-04-27 DIAGNOSIS — I10 ESSENTIAL HYPERTENSION: ICD-10-CM

## 2020-04-27 RX ORDER — CLONIDINE HYDROCHLORIDE 0.1 MG/1
0.1 TABLET ORAL 2 TIMES DAILY
Qty: 180 TAB | Refills: 0 | Status: SHIPPED | OUTPATIENT
Start: 2020-04-27 | End: 2020-07-01

## 2020-04-27 NOTE — TELEPHONE ENCOUNTER
Last visit 07/18/2019 MD Tung Hernandez   Next appointment 05/06/2020 MD Tung Hernandez   Previous refill encounter(s) 07/16/2019 Catapres tab #180 with 3 refills     Requested Prescriptions     Pending Prescriptions Disp Refills    cloNIDine HCL (CATAPRES) 0.1 mg tablet 180 Tab 0     Sig: Take 1 Tab by mouth two (2) times a day.

## 2020-04-27 NOTE — TELEPHONE ENCOUNTER
Patient scheduled for virtual visit this afternoon with Dr. Kianna Hayes. Patient reports her son will assist with virtual visit.

## 2020-04-28 DIAGNOSIS — I10 ESSENTIAL HYPERTENSION: ICD-10-CM

## 2020-04-28 RX ORDER — METOPROLOL SUCCINATE 50 MG/1
TABLET, EXTENDED RELEASE ORAL
Qty: 90 TAB | Refills: 0 | Status: SHIPPED | OUTPATIENT
Start: 2020-04-28 | End: 2020-07-01

## 2020-04-28 RX ORDER — AMLODIPINE BESYLATE 5 MG/1
TABLET ORAL
Qty: 90 TAB | Refills: 0 | Status: SHIPPED | OUTPATIENT
Start: 2020-04-28 | End: 2020-07-01

## 2020-05-06 ENCOUNTER — VIRTUAL VISIT (OUTPATIENT)
Dept: INTERNAL MEDICINE CLINIC | Age: 75
End: 2020-05-06

## 2020-05-06 VITALS — DIASTOLIC BLOOD PRESSURE: 51 MMHG | SYSTOLIC BLOOD PRESSURE: 138 MMHG

## 2020-05-06 DIAGNOSIS — G62.9 NEUROPATHY: ICD-10-CM

## 2020-05-06 DIAGNOSIS — Z79.4 CONTROLLED TYPE 2 DIABETES MELLITUS WITH DIABETIC PERIPHERAL ANGIOPATHY WITHOUT GANGRENE, WITH LONG-TERM CURRENT USE OF INSULIN (HCC): ICD-10-CM

## 2020-05-06 DIAGNOSIS — E11.51 CONTROLLED TYPE 2 DIABETES MELLITUS WITH DIABETIC PERIPHERAL ANGIOPATHY WITHOUT GANGRENE, WITH LONG-TERM CURRENT USE OF INSULIN (HCC): ICD-10-CM

## 2020-05-06 DIAGNOSIS — E11.42 DIABETIC POLYNEUROPATHY ASSOCIATED WITH TYPE 2 DIABETES MELLITUS (HCC): ICD-10-CM

## 2020-05-06 DIAGNOSIS — N18.32 CKD STAGE G3B/A2, GFR 30-44 AND ALBUMIN CREATININE RATIO 30-299 MG/G (HCC): Primary | ICD-10-CM

## 2020-05-06 DIAGNOSIS — I10 ESSENTIAL HYPERTENSION: ICD-10-CM

## 2020-05-06 DIAGNOSIS — E11.21 DIABETIC NEPHROPATHY ASSOCIATED WITH TYPE 2 DIABETES MELLITUS (HCC): ICD-10-CM

## 2020-05-06 DIAGNOSIS — E78.5 HYPERLIPIDEMIA, UNSPECIFIED HYPERLIPIDEMIA TYPE: ICD-10-CM

## 2020-05-06 RX ORDER — GABAPENTIN 100 MG/1
100 CAPSULE ORAL DAILY
Qty: 90 CAP | Refills: 1 | Status: SHIPPED | OUTPATIENT
Start: 2020-05-06 | End: 2021-03-03 | Stop reason: SDUPTHER

## 2020-05-06 RX ORDER — GLIPIZIDE 5 MG/1
2.5 TABLET ORAL DAILY
Qty: 45 TAB | Refills: 3 | Status: SHIPPED | OUTPATIENT
Start: 2020-05-06 | End: 2021-02-26 | Stop reason: ALTCHOICE

## 2020-05-06 NOTE — PROGRESS NOTES
Patients son assisting her with Virtual Visit today     Chief Complaint   Patient presents with    Follow-up     diabetes     1. Have you been to the ER, urgent care clinic since your last visit? Hospitalized since your last visit? No    2. Have you seen or consulted any other health care providers outside of the [address] [place] [address] since your last visit? Include any pap smears or colon screening.  no    Health Maintenance Due   Topic Date Due    DTaP/Tdap/Td series (1 - Tdap) 07/18/1964    Shingrix Vaccine Age 50> (1 of 2) 07/18/1993       Learning Assessment 6/20/2017   PRIMARY LEARNER Patient   PRIMARY LANGUAGE ENGLISH   LEARNER PREFERENCE PRIMARY READING   ANSWERED BY patient   RELATIONSHIP SELF

## 2020-05-06 NOTE — PROGRESS NOTES
Pilar Smith is a 66 y.o. female who was seen by synchronous (real-time) audio-video technology on 5/6/2020. Pursuant to the emergency declaration under the [employer] and [facility], 1135 waiver authority and the [employer] and [employer] Act, this Virtual Visit was conducted, with patient's consent, to reduce the patient's risk of exposure to COVID-19 and provide continuity of care for an established patient. Services were provided through a video synchronous discussion virtually to substitute for in-person appointment. Consent:  She and/or her healthcare decision maker is aware that this patient-initiated Telehealth encounter is a billable service, with coverage as determined by her insurance carrier. She is aware that she may receive a bill and has provided verbal consent to proceed: Yes    I was in the office while conducting this encounter. Assessment & Plan:   Diagnoses and all orders for this visit:    1. CKD stage G3b/A2, GFR 30-44 and albumin creatinine ratio  mg/g (HCC)    2. Controlled type 2 diabetes mellitus with diabetic peripheral angiopathy without gangrene, with long-term current use of insulin (HCC)  -     glipiZIDE (GLUCOTROL) 5 mg tablet; Take 0.5 Tabs by mouth daily. With breakfast  -     REFERRAL TO DIABETIC EDUCATION; Standing  -     HEMOGLOBIN A1C WITH EAG    3. Diabetic nephropathy associated with type 2 diabetes mellitus (HCC)  -     glipiZIDE (GLUCOTROL) 5 mg tablet; Take 0.5 Tabs by mouth daily. With breakfast  -     REFERRAL TO DIABETIC EDUCATION; Standing  -     HEMOGLOBIN A1C WITH EAG    4. Diabetic polyneuropathy associated with type 2 diabetes mellitus (HCC)  -     glipiZIDE (GLUCOTROL) 5 mg tablet; Take 0.5 Tabs by mouth daily. With breakfast  -     REFERRAL TO DIABETIC EDUCATION; Standing  -     HEMOGLOBIN A1C WITH EAG    5. Neuropathy  -     gabapentin (NEURONTIN) 100 mg capsule;  Take 1 Cap by mouth daily. Max Daily Amount: 100 mg.    6. Essential hypertension    7. Hyperlipidemia, unspecified hyperlipidemia type  -     LIPID PANEL        Diarrhea with history of Constipation: disucssed adding fiber supplement, metamucil     DMII: A1C goal < 7.5. March A1C 8.0. . Blood sugars were low while on basal insulin. - change last fall to low dose glipizide and no further overnight lows. - unable to take metformin due to ckd. - Patient on minimal medication. Using low dose short acting glipizide due to cost of medication. Seems to have high level of carbohydrate sensitivity, but lower than necessary fasting levels. Referred to diabetes education to provide support on carb control if possible.      HTN/CKD: BP at goal. follows with nephrologist. Met on Friday with Dr. Bryanna Mary. Schedule labs to be done sometime this month for [facility].  Not on statin. EKG today with nonspecific findings but concerning for piror MI.    - lipid requested   - continue BP control, appreciate assistance of nephrology. Asked Norberto Landeros to see if Dr. Bryanna Mary is willing to prescribe BP medicaitons.       Osteoporosis: follows with nephrologist. Not a candidate for bisophsphonate due to CKD.       Low Vitamin D: in range 6 months ago, continue current medications.      Unilateral AKA: doing better wiuth prosthesis and overall not stable in gait independently. but still in wheelchair     Abn EKG: followed up with cardiology. Normal echo, no further testing at this time. 712  Subjective: Kimberly Mora was seen for Follow-up (diabetes)    Living in her home in her apartment. Son Norberto Landeros is coming in to check on her. Diarrhea every now and then, with lots of gas. May last for 2 eipsodes. Takes pepto-bismol. Hasn't had one in 1-2 weeks. Episodes started a little overa  Month ago. Just watery and brown, no visible blood. Home blood sugars:    - morning, high 80s-100s.     - before dinner having numbers as high as 133-    Drinking diet ginerale and V8 juice. Eats turkey pot pie for lunch (42 grams carbs) broccoli and beef (52). Baked chicken, tuna. Only taking glipizide in the morning. No N/V     Prior to Admission medications    Medication Sig Start Date End Date Taking? Authorizing Provider   amLODIPine (NORVASC) 5 mg tablet TAKE 1 TABLET EVERY DAY 4/28/20  Yes Nellie Herrera MD   metoprolol succinate (TOPROL-XL) 50 mg XL tablet TAKE 1 TABLET EVERY DAY 4/28/20  Yes Nellie Herrera MD   cloNIDine HCL (CATAPRES) 0.1 mg tablet Take 1 Tab by mouth two (2) times a day. 4/27/20  Yes Nellie Herrera MD   Vitamin D3 50 mcg (2,000 unit) tab TAKE 1 TABLET BY MOUTH EVERY DAY 4/22/20  Yes Nellie Herrera MD   glucose blood VI test strips (TRUE METRIX GLUCOSE TEST STRIP) strip TEST BLOOD SUGAR ON AVERAGE 3 TIMES DAILY. 2/11/20  Yes Nellie Herrera MD   gabapentin (NEURONTIN) 100 mg capsule Take 1 Cap by mouth two (2) times a day. Max Daily Amount: 200 mg. 11/7/19  Yes Nellie Herrera MD   polyethylene glycol [facility]) 17 gram/dose powder Take 17 g by mouth daily. And as instructed. 9/10/19  Yes Nellie Herrera MD   glipiZIDE (GLUCOTROL) 5 mg tablet Take 0.5 Tabs by mouth daily. 9/10/19  Yes Nellie Herrera MD   furosemide (LASIX) 40 mg tablet TAKE 1 TABLET EVERY DAY 4/25/19  Yes Nellie Herrera MD   hydrALAZINE (APRESOLINE) 10 mg tablet TAKE 1 TABLET THREE TIMES DAILY 3/18/19  Yes Nellie Herrera MD   lancets (PRODIGY TWIST TOP LANCET) 28 gauge misc To measure blood sugar 3-4 times daily. Dx code E 11.51, Z79.4 10/17/18  Yes Nellie Herrera MD   Blood-Glucose Meter (TRUE METRIX AIR GLUCOSE METER) monitoring kit Use to test blood sugar on average 3 times per day.  Dx code: E11.51 and Z79.4 10/9/18  Yes Nellie Herrera MD   ALCOHOL PREP PADS padm  7/11/18  Yes Provider, Historical   gum/pva/dental adhesive ([address] [place])  7/11/18  Yes Provider, Historical   lisinopril ([address], ZESTRIL) 20 mg tablet Take 20 mg by mouth daily. 10/23/17  Yes Provider, Historical   insulin glargine (LANTUS SOLOSTAR U-100 INSULIN) 100 unit/mL (3 mL) inpn 10 Units by SubCUTAneous route daily. 1/28/19   Angela Mcgee MD   Insulin Needles, Disposable, (BD ULTRA-FINE SHORT PEN NEEDLE) 31 gauge x 5/16\" ndle Use to test blood sugar on average 3 times per day. Dx code: E11.51 and Z79.4 10/26/18   Angela Mcgee MD     No Known Allergies    Social History     Tobacco Use    Smoking status: Former Smoker     Years: 15.00    Smokeless tobacco: Never Used   Substance Use Topics    Alcohol use: No     Review of Systems   Constitutional: Negative for chills, fever and malaise/fatigue. Respiratory: Negative for shortness of breath. Cardiovascular: Negative for chest pain. Psychiatric/Behavioral: Negative for depression, substance abuse and suicidal ideas.      PHYSICAL EXAMINATION:    Vital Signs: (As obtained by patient/caregiver at home)  Visit Vitals  /51 (BP 1 Location: Left arm, BP Patient Position: Sitting)   LMP  (LMP Unknown)        Constitutional: [x] Appears well-developed and well-nourished [x] No apparent distress      Mental status: [x] Alert and awake  [x] Oriented to person/place/time [x] Able to follow commands      Eyes:   EOM    [x]  Normal      Sclera  [x]  Normal              Discharge [x]  None visible       HENT: [x] Normocephalic, atraumatic    [x] Mouth/Throat: Mucous membranes are moist    External Ears [x] Normal      Neck: [x] No visualized mass     Pulmonary/Chest: [x] Respiratory effort normal   [x] No visualized signs of difficulty breathing or respiratory distress         Musculoskeletal:   [x] Normal range of motion of neck        Neurological:        [x] No Facial Asymmetry (Cranial nerve 7 motor function) (limited exam due to video visit)          [x] No gaze palsy              Skin:        [x] No significant exanthematous lesions or discoloration noted on facial skin              Psychiatric:       [x] Normal Affect        [x] Normal behavior    Other pertinent observable physical exam findings:- none        We discussed the expected course, resolution and complications of the diagnosis(es) in detail. Medication risks, benefits, costs, interactions, and alternatives were discussed as indicated. I advised her to contact the office if her condition worsens, changes or fails to improve as anticipated. She expressed understanding with the diagnosis(es) and plan. Pursuant to the emergency declaration under the [address] [place] [address], [facility] waiver authority and the [employer] and [employer] Act, this Virtual  Visit was conducted, with patient's consent, to reduce the patient's risk of exposure to COVID-19 and provide continuity of care for an established patient. Services were provided through a video synchronous discussion virtually to substitute for in-person clinic visit.     Jen Casas MD

## 2020-05-21 ENCOUNTER — VIRTUAL VISIT (OUTPATIENT)
Dept: DIABETES SERVICES | Age: 75
End: 2020-05-21

## 2020-05-21 DIAGNOSIS — E11.21 DIABETIC NEPHROPATHY ASSOCIATED WITH TYPE 2 DIABETES MELLITUS (HCC): ICD-10-CM

## 2020-05-21 DIAGNOSIS — E11.42 DIABETIC POLYNEUROPATHY ASSOCIATED WITH TYPE 2 DIABETES MELLITUS (HCC): ICD-10-CM

## 2020-05-21 NOTE — Clinical Note
Pre-program assessment completed. Patient is scheduled to meet with RD next week to focus on diet. She is not interested in completing our full DSMES program at this time, but is aware that she has up to 9 hours she can use over the next 12 months for education if needed. Thanks!  Shubham Abarca

## 2020-05-21 NOTE — PROGRESS NOTES
[facility]  Diabetes Self-Management Education   Pre-program Assessment    Reason for Referral: DSMES-focus on diet only. Patient concerned about weight loss with aging, CKD III, and T2DM. Patient reports she has never received DSMES, but is not interested in completing full DSMES program at this time. She requested to meet with an RD to address dietary concerns. Patient reports weight prior to L AKA was 160-163 lb, and since amputation, weight is 143 lb. Unable to determine if patient has had any real fat or muscle loss, given expected decrease in weight after leg amputation. She wants to prevent muscle/fat loss with aging. Referral Source: Sarabjit Brock MD    Metric Patient responses (5/21/2020)   A1c  (Goal: 7%)   Recent value:   Lab Results   Component Value Date/Time    Hemoglobin A1c 6.2 (H) 09/10/2019 12:43 PM    Hemoglobin A1c (POC) 5.7 01/28/2019 09:10 AM        Healthy Eating     24-hour Dietary Recall:  Breakfast: oatmeal, turkey bridges, turkey link, 1 boiled egg, 1 slice toast  Lunch: (not consistent w/ eating lunch prior to COVID-19, now son bring her lunch): turkey/ham sandwich & fruit cup or piece of fruit  Dinner: meat with starch and vegetable (sweet potato, broccoli, cauliflower)  Snacks: chips, cookies, crackers, fruit  Beverages: (pt reports no fluid restrictions) water (large quantity throughout the day), diet green tea, diet soda, low Na+ V8 juice  Alcohol: none    Stage of change: Preparation    Patient has concerns related to aging and losing weight. She notes prior to her L AKA in 2016, she weighed 150s-160s lb, now around 143 lb. Discussed the expectation that she would lose weight after a leg amputation, but pt is concerned about weight loss none the less and requests to meet with a RD. She has a dx of CKD III and has never met with an RD to discuss diet. She is followed by nephrologist Dr. Luann Saleh.     Being Active     Physical Activity Vital Sign:  How many days during the past week have you performed physical activity where your heart beats faster and your breathing is harder than normal for 30 minutes or more?  7 days    How many days in a typical week do you perform activity such as this?  4 days    Stage of change: Action    Patient is retired and [facility] bound due to a left AKA in 2016. Prior to COVID-19, patient attended exercise classes within her apartment complex. Last week, she began wheeling herself in her WC up and down her hallway and lifting dumb bells on a daily basis. Discussed performing physical activity after lunch in an attempt to lower before dinner BG readings. Monitoring Do you monitor your blood sugar? Yes    How often do you monitor? 2x/day    What are the range of readings?  mg/dL (Range for last 4 weeks)  Breakfast:  mg/dL (Review of last 2 weeks)  Lunch: n/a mg/dL  Dinner: 177-216 mg/dL (Review of last 2 weeks)  Bedtime: n/a mg/dL    Do you know your last A1c measurement? Yes   Reviewed meaning of A1C and estimated average BG of 132 based on her most recent A1C of 6.2. Do you know the meaning of the A1c? No    Stage of change: Action     Taking Medications Medication Management:  Do you understand what your diabetes medications do? No   Reviewed MOA of glipizide. Can you afford your diabetes medications? Yes    How often do you miss doses of your diabetes medications? Never    Current dosing:   Key Antihyperglycemic Medications             glipiZIDE (GLUCOTROL) 5 mg tablet Take 0.5 Tabs by mouth daily. With breakfast          Blood Pressure Management:  Key ACE/ARB Medications             lisinopril (PRINIVIL, ZESTRIL) 20 mg tablet Take 20 mg by mouth daily. Lipid Management:  Key Antihyperlipidemia Meds     The patient is on no antihyperlipidemia meds. Clot Prevention:  Key Anti-Platelet Anticoagulant Meds     The patient is on no antiplatelet meds or anticoagulants.           Stage of change: Maintenance     Healthy Coping Diabetes Skills, Confidence and Preparedness Index: Total score: 5.9  Skills: 5.9  Confidence: 5.4  Preparedness: 6.5    Stage of change: Preparation    Patient has a supportive son, Nissa Pereira, who assists with grocery shopping and bringing patient meals. His wife assists patient with meal prep and cooking. Reducing Risks Vaccines:  Influenza:   Immunization History   Administered Date(s) Administered    Influenza High Dose Vaccine PF 10/25/2017    Influenza Vaccine (Tri) Adjuvanted 01/28/2019, 09/10/2019       Pneumococcal:   Immunization History   Administered Date(s) Administered    Pneumococcal Conjugate (PCV-13) 07/25/2017    Pneumococcal Polysaccharide (PPSV-23) 07/26/2018        Hepatitis: There is no immunization history for the selected administration types on file for this patient.     Examinations:  Diabetic Foot and Eye Exam HM Status   Topic Date Due    Diabetic Foot Care  09/10/2020    Eye Exam  09/13/2020        Dental exam: unknown    Foot exam: Completed 9/10/2019    Heart Protection:  BP Readings from Last 2 Encounters:   05/06/20 138/51   09/10/19 129/67        Lab Results   Component Value Date/Time    LDL, calculated 7 01/28/2019 09:55 AM        Kidney Protection:  Lab Results   Component Value Date/Time    Microalb/Creat ratio (ug/mg creat.) 1,258.9 (H) 06/20/2017 11:34 AM       Patient-reported diabetes complications:  HTN, CKD III, peripheral vascular disease, left AKA, and glaucoma    Stage of change: Maintenance     Problem Solving Hypoglycemia Management:  What are signs and symptoms of hypoglycemia that you experience? n/a, patient denies episodes of hypoglycemia    How do you prevent hypoglycemia? n/a    How do you treat hypoglycemia? n/a    Hyperglycemia Management:  What are signs and symptoms of hyperglycemia that you experience? n/a, patient has not experienced a BG > 250 mg/dL    How can you prevent hyperglycemia? n/a    Sick Day Management:  What do you do differently on sick days? Stay hydrated, Check blood glucose every 2-4 hours, Eat meals, soft foods, or drink caloric beverages every 4 hours, Take diabetes medication as instructed by provider    Pattern Management:  Do you notice blood glucose patterns when you look at the readings in your meter or logbook? No    How do you use the blood glucose readings from your meter or logbook? Pt reported being unaware of pattern management skills    Stage of change: Precontemplation   Note: Content derived from the American Association of Diabetes Educators' Diabetes Education Curriculum: A Guide to Successful Self-Management (2nd edition)      Diabetes Educator Recommendations:  - Address diabetes self-care behaviors through education and support using AADE7 Curriculum. Pt to visit with RD to address nutritional needs. - Pt to follow up with provider on the following: n/a    Diabetes Self-Management Education Follow-up Visit: Virtual visit scheduled on 5/29/20 @ 10:30 am.      [facility] [facility]  Emergency Adaptations for Telehealth: Maxine Morales is a 66 y.o. female being evaluated through a synchronous (real-time) audio-video technology platform, as a substitution for an in-person encounter, to address the healthcare issues mentioned above. A caregiver was present when appropriate. The patient and/or her healthcare decision maker, is aware that this patient-initiated, Telehealth encounter on 5/21/2020 is a billable service, with coverage as determined by her insurance carrier. She is aware that she may receive a bill and has provided verbal consent to proceed: Yes. This teleHealth encounter occurred during the COVID-19 pandemic and public health emergency. Evaluation of the following organ systems was limited: Vitals/Constitutional/EENT/Resp/CV/GI//MS/Neuro/Skin/Heme-Lymph-Imm.   Pursuant to the emergency declaration under the [facility], 1135 waiver authority and the Coronavirus Preparedness and Response Supplemental Appropriations Act, this Virtual Visit was conducted with patient's (and/or legal guardian's) consent, to reduce the risk of exposure to COVID-19 and provide necessary medical care. --Jennifer Gordon RN on 5/21/2020 at 3:16 PM    An electronic signature was used to authenticate this note. I was in the office for the appointment and time spent: 30 minutes    _____________________________________________________________________________________________________________________    Overall SCPI score: 5.9  Skills Score: 5.9  Low: Blood Sugar Monitoring(Q4)  Confidence Score: 5.4  Low: Healthy Eating(Q4),Problem XPRNPMY(G9)  Preparedness Score: 6.5  Low: Reducing Risks(Q4)  Healthy Eating Score: 5.8  Low: Confidence(Q4)  Taking Medication Score: 6.0  Low: Skills(Q2)  Blood Sugar Monitoring Score: 5.8  Low: EFGSUZ(I5)  Reducing Risks Score: 6.0  Low: Preparedness(Q4)  Problem Solving Score: 5.7  Low: Confidence(Q7)  Healthy Coping Score: 6.0  Low: Skills(Q7),Confidence(Q2),Preparedness(Q3)  Being Active Score: 6.5  Low: Confidence(Q5)  Skills/Knowledge Questions  1. I know how to plan meals that have the best balance between carbohydrates, proteins and vegetables. 6  2. I know how my diabetes medications (pills, injectables and/or insulin) work in my body. 6  3. I know when to check my blood sugar if I want to see how my body responded to a meal. 6  4. I know when to check my blood sugars to determine if my medication or insulin doses are correct. 4  5. I know what to do to prevent a low blood sugar when I exercise (either before, during, or after). 7  6. When I am sick, I know what to do differently with my diabetes management. 6  7. I know how stress can affect my diabetes management. 6  8. When I look at my blood sugars over a given week, I can explain what my blood sugar pattern is. 6  9.  I know what my target levels are for A1c, blood pressure and cholesterol. 6  Confidence Questions  1. I am confident that I can plan balanced meals and snacks. 6  2. I am confident that I can manage my stress. 6  3. I am confident that I can prevent a low blood sugar during or after exercise. 6  4. I am confident that the next time I eat out, I will be able to choose foods that best keep my blood sugars in target. 4  5. I am confident I can include exercise into my schedule. 6  6. I am confident that I can use my daily blood sugars to adjust my diet, my activity, and/or my insulin. 6  7. When something out of my normal routine happens, I am confident that I can problemsolve  and keep my diabetes on track. 4  Preparedness Questions  1. Within the next month, I will begin to eat more balanced meals and snacks. 8  2. Within the next month, I will choose an exercise activity and I will start fitting it into my schedule. 8  3. Within the next month, I will make a list of stress management options that work for me. 6  4. Within the next month, I will consistently plan ahead to prevent low blood sugars. 5  5. Within the next month, I will start adjusting my insulin doses on my own. 0  6. Within the next month, I will begin making changes to my diabetes management based on my daily blood sugars (eg eating,  activity and/or insulin). 8  7. Within the next month, I will begin making changes to my diabetes management to meet my overall goals (eg eating,  activity  and/or insulin).   8

## 2020-05-29 ENCOUNTER — VIRTUAL VISIT (OUTPATIENT)
Dept: DIABETES SERVICES | Age: 75
End: 2020-05-29

## 2020-05-29 DIAGNOSIS — E11.21 DIABETIC NEPHROPATHY ASSOCIATED WITH TYPE 2 DIABETES MELLITUS (HCC): ICD-10-CM

## 2020-05-29 NOTE — PROGRESS NOTES
[facility] Program for Diabetes Health  Diabetes Self-Management Education   Education and Goal Plan for Session #2    AADE7 Self-Care Behaviors:  Behavior Education Completed (5/29/2020)   Healthy Eating   - Impact of food on blood glucose levels  - Food sources of carbohydrates  - Meal size and portions  - Reading food labels  - Meal and snack timing  - Carbohydrate counting   - Importance of a variety of foods  - Controlling portions of carbohydrates  - Pt has an excellent understanding of her food groups and nutrients. Visited with pt and her son, Viktor Dunbar, by telehealth and she shared the current meal planning she is practicing. Currently, she is using whole grain breads, she is also preparing her own breakfast and lunch. She shared her daughter-in-law makes her evening meals for her and they are brought to her by her son as she does not feel confident standing on her leg at the hot stove to cook. Her son and daughter-in-law completed her shopping and evening meals. She also has friends in her complex who will bring her items. She has asked about ice cream, cookies and ice cream sandwiches - currently there are limited restrictions if she is at stage 3 CKD; therefore, she was instructed how to portion control and to put these items with her meals when her DM medications are dosed. She eats a small snack at night and we discussed portions for this too and that as long as her BG in am remains <130 mg/dl, we will not discontinue at this time. Currently, she may drink low sodium V8 juice for taking her pills, mostly H20. I will discuss with her the future food limitations that may be imposed if her CKD progresses at our next visit. Being Active   - rolling her wheelchair up and down her halls for exercise.       Monitoring     - Frequency of monitoring  - Monitoring blood glucose trends per meter  - though she has declined education in this area - will attempt to troubleshoot her testing tid ac per MD request versus her current bid testing   Taking Medications Not addressed during this session. Healthy Coping Not addressed during this session. Reducing Risks Education delivered in previous session. Problem Solving Not addressed during this session. Note: Content derived from the American Association of Diabetes Educators' Diabetes Education Curriculum: A Guide to Successful Self-Management (2nd edition)      Diabetes Educator Recommendations:  - Continue addressing diabetes self-care behaviors through education and support in AADE7 Curriculum individual sessions on reducing risks, healthy eating, being active, monitoring, taking medications, healthy coping and problem solving.  - Continue practicing knowledge and skills relating to healthy eating, monitoring and medications to improve diabetes self-management. - Patient's Identified SMART Goal(s): - limit sweets to less than once daily and have with a meal.   - Pt to follow up with provider on the following: nothing at this time. Diabetes Self-Management Education Follow-up Visit: 6/12/20. [address] Emergency [facility] for Telehealth: Robin Holt is a 66 y.o. female being evaluated through a synchronous (real-time) audio-video technology platform, as a substitution for an in-person encounter, to address the healthcare issues mentioned above. I was in the office. The patient was at home. A caregiver was present when appropriate. The patient and/or her healthcare decision maker, is aware that this patient-initiated, Telehealth encounter on 5/29/2020 is a billable service, with coverage as determined by her insurance carrier. She is aware that she may receive a bill and has provided verbal consent to proceed: Yes. This teleHealth encounter occurred during the COVID-19 pandemic and public health emergency.  Evaluation of the following organ systems was limited: Vitals/Constitutional/EENT/Resp/CV/GI//MS/Neuro/Skin/Heme-Lymph-Imm. Pursuant to the emergency declaration under the [address] [place] [address], [address] [place] [address] authority and the [employer] and [employer] Act, this Virtual Visit was conducted with patient's (and/or legal guardian's) consent, to reduce the risk of exposure to COVID-19 and provide necessary medical care. -Ines VELASQUEZ on 5/29/2020 at 4:48 PM    An electronic signature was used to authenticate this note.  I was in the office for the appointment and time spent: 60 minutes

## 2020-05-30 DIAGNOSIS — I10 ESSENTIAL HYPERTENSION: ICD-10-CM

## 2020-06-01 RX ORDER — FUROSEMIDE 40 MG/1
TABLET ORAL
Qty: 90 TAB | Refills: 3 | Status: SHIPPED | OUTPATIENT
Start: 2020-06-01 | End: 2020-07-01

## 2020-06-16 ENCOUNTER — VIRTUAL VISIT (OUTPATIENT)
Dept: DIABETES SERVICES | Age: 75
End: 2020-06-16

## 2020-06-16 DIAGNOSIS — E11.42 DIABETIC POLYNEUROPATHY ASSOCIATED WITH TYPE 2 DIABETES MELLITUS (HCC): ICD-10-CM

## 2020-06-16 DIAGNOSIS — E11.21 DIABETIC NEPHROPATHY ASSOCIATED WITH TYPE 2 DIABETES MELLITUS (HCC): ICD-10-CM

## 2020-06-16 NOTE — PROGRESS NOTES
[address] [place] [address] for Diabetes Health  Diabetes Self-Management Education   Education and Goal Plan for Session #2 part 1 and 2    AADE7 Self-Care Behaviors:  Behavior Education Completed (6/16/2020)   Healthy Eating   - Importance of a variety of foods  - Controlling portions of carbohydrates  - Improving water intake  - Comparing food choices  - Meal consistency  - adding snacks as needed based on BG levels and to regain lost weight. Being Active   Not addressed during this session. Monitoring     - Blood glucose schedule  - Monitoring blood glucose trends per meter  - Logging blood glucose results   Taking Medications - Medication review   Healthy Coping Not addressed during this session. Reducing Risks Education delivered in previous session. Problem Solving Not addressed during this session. Note: Content derived from the American Association of Diabetes Educators' Diabetes Education Curriculum: A Guide to Successful Self-Management (2nd edition)         Diabetes Educator Recommendations:     - Continue addressing diabetes self-care behaviors through education and support in AADE7 Curriculum individual sessions on reducing risks, healthy eating, being active, monitoring, taking medications, healthy coping and problem solving.     - Continue practicing knowledge and skills relating to healthy eating and monitoring and medications to improve diabetes self-management. - Patient's Identified SMART Goal(s): - test BG values prior to dinner for any 3 days in the next two weeks. - Pt to follow up with provider on the following: nothing at this time. Diabetes Self-Management Education Follow-up Visit: 6/29/20 due to son's schedule availability. [facility] for Telehealth:     Woodrow Layne is a 66 y.o. female being evaluated through a synchronous (real-time) audio-video technology platform, as a substitution for an in-person encounter, to address the healthcare issues mentioned above. I was in the office. The patient was at home. A caregiver was present when appropriate. The patient and/or her healthcare decision maker, is aware that this patient-initiated, Telehealth encounter on 6/16/2020 is a billable service, with coverage as determined by her insurance carrier. She is aware that she may receive a bill and has provided verbal consent to proceed: Yes. This telehealth encounter occurred during the COVID-19 pandemic and public health emergency. Evaluation of the following organ systems was limited: Vitals/Constitutional/EENT/Resp/CV/GI//MS/Neuro/Skin/Heme-Lymph-Imm. Pursuant to the emergency declaration under the [address] [place] [address], [address] [place] [address] authority and the [employer] and [employer] Act, this Virtual Visit was conducted with patient's (and/or legal guardian's) consent, to reduce the risk of exposure to COVID-19 and provide necessary medical care. Gadiel VELASQUEZ on 6/16/2020 at 1:10 PM    An electronic signature was used to authenticate this note.  I was in the office for the appointment and time spent: 60 minutes

## 2020-06-26 ENCOUNTER — TELEPHONE (OUTPATIENT)
Dept: INTERNAL MEDICINE CLINIC | Age: 75
End: 2020-06-26

## 2020-06-26 DIAGNOSIS — N18.32 CKD STAGE G3B/A2, GFR 30-44 AND ALBUMIN CREATININE RATIO 30-299 MG/G (HCC): Primary | ICD-10-CM

## 2020-06-26 DIAGNOSIS — I63.40 CEREBROVASCULAR ACCIDENT (CVA) DUE TO EMBOLISM OF CEREBRAL ARTERY (HCC): ICD-10-CM

## 2020-06-26 DIAGNOSIS — D64.9 ANEMIA, UNSPECIFIED TYPE: ICD-10-CM

## 2020-06-26 NOTE — TELEPHONE ENCOUNTER
Called and advised Dr. Shoshana Nicolas will be following Home Health orders. Patient scheduled virrtual visit at 4:15 07/07/2020 I will call Monday to see if we can move apt to a sooner date also to advise to get blood work done next week regardless.

## 2020-06-26 NOTE — PROGRESS NOTES
Lab orders placed to be drawn next week. If possible move appointment to next week, if not, can keep July 7th date.      Shawn White MD

## 2020-06-26 NOTE — TELEPHONE ENCOUNTER
Pt is in hospital and being sent home with home health orders. Home Health needs to know if Dr Shoshana Nicolas will be following her care once at home.       Ms Porsche Gardner W/ intake at home health   [phone]

## 2020-06-29 ENCOUNTER — VIRTUAL VISIT (OUTPATIENT)
Dept: DIABETES SERVICES | Age: 75
End: 2020-06-29

## 2020-06-29 DIAGNOSIS — E11.51 CONTROLLED TYPE 2 DIABETES MELLITUS WITH DIABETIC PERIPHERAL ANGIOPATHY WITHOUT GANGRENE, WITH LONG-TERM CURRENT USE OF INSULIN (HCC): ICD-10-CM

## 2020-06-29 DIAGNOSIS — E11.21 DIABETIC NEPHROPATHY ASSOCIATED WITH TYPE 2 DIABETES MELLITUS (HCC): ICD-10-CM

## 2020-06-29 DIAGNOSIS — Z79.4 CONTROLLED TYPE 2 DIABETES MELLITUS WITH DIABETIC PERIPHERAL ANGIOPATHY WITHOUT GANGRENE, WITH LONG-TERM CURRENT USE OF INSULIN (HCC): ICD-10-CM

## 2020-06-29 NOTE — PROGRESS NOTES
[facility] Program for Diabetes Health  Diabetes Self-Management Education   Education and Goal Plan for Session #2     AADE7 Self-Care Behaviors:  Behavior Education Completed (6/29/2020)   Healthy Eating   - Heart healthy fats  - Reducing sodium  - Meal consistency  - Reviewed nutrition labels for sodium and completed food data base searches for options to replace patient's typical food choices. Being Active   Not addressed during this session. Monitoring     Not addressed during this session. Taking Medications Not addressed during this session. Healthy Coping Not addressed during this session. Reducing Risks - Pt reports that she was admitted to the hospital last week on Monday and discharged on Friday s/p another slight stroke. She and her son, Davida Ibarra request assistance with managing her sodium intake and following her heart healthy diet (see nutrition section above). Problem Solving Not addressed during this session. Note: Content derived from the American Association of Diabetes Educators' Diabetes Education Curriculum: A Guide to Successful Self-Management (2nd edition)         Diabetes Educator Recommendations:     - Continue addressing diabetes self-care behaviors through education and support in AADE7 Curriculum individual sessions on reducing risks, healthy eating, being active, monitoring, taking medications, healthy coping and problem solving.     - Continue practicing knowledge and skills relating to healthy eating to improve diabetes self-management. - Patient's Identified SMART Goal(s): - read nutrition labels to find lower sodium food choices for deli meats, bread and cheese to include into her meal plan. - Pt to follow up with provider on the following: nothing at this time. Diabetes Self-Management Education Follow-up Visit: [id number] at 9:30 am.     [facility] for Telehealth:     Bruce Young is a 66 y.o. female being evaluated through a synchronous (real-time) audio-video technology platform, as a substitution for an in-person encounter, to address the healthcare issues mentioned above. I was in the office. The patient was at home. A caregiver was present when appropriate. The patient and/or her healthcare decision maker, is aware that this patient-initiated, Telehealth encounter on 6/29/2020 is a billable service, with coverage as determined by her insurance carrier. She is aware that she may receive a bill and has provided verbal consent to proceed: Yes. This telehealth encounter occurred during the COVID-19 pandemic and public health emergency. Evaluation of the following organ systems was limited: Vitals/Constitutional/EENT/Resp/CV/GI//MS/Neuro/Skin/Heme-Lymph-Imm. Pursuant to the emergency declaration under the [address] [place] [address], [address] [place] [address] authority and the [employer] and [employer] Act, this Virtual Visit was conducted with patient's (and/or legal guardian's) consent, to reduce the risk of exposure to COVID-19 and provide necessary medical care. -Noe VELASQUEZ on 6/29/2020 at 11:17 AM    An electronic signature was used to authenticate this note.  I was in the office for the appointment and time spent: 60 minutes

## 2020-06-29 NOTE — TELEPHONE ENCOUNTER
Patients son scheduled apt for 7/1/2020 as virtual and was made aware of repeat blood work to be drawn this week at any local lab [facility]

## 2020-07-01 ENCOUNTER — VIRTUAL VISIT (OUTPATIENT)
Dept: INTERNAL MEDICINE CLINIC | Age: 75
End: 2020-07-01

## 2020-07-01 DIAGNOSIS — E11.51 CONTROLLED TYPE 2 DIABETES MELLITUS WITH DIABETIC PERIPHERAL ANGIOPATHY WITHOUT GANGRENE, WITH LONG-TERM CURRENT USE OF INSULIN (HCC): ICD-10-CM

## 2020-07-01 DIAGNOSIS — I63.40 CEREBROVASCULAR ACCIDENT (CVA) DUE TO EMBOLISM OF CEREBRAL ARTERY (HCC): Primary | ICD-10-CM

## 2020-07-01 DIAGNOSIS — S78.112A UNILATERAL AKA, LEFT (HCC): ICD-10-CM

## 2020-07-01 DIAGNOSIS — Z79.4 CONTROLLED TYPE 2 DIABETES MELLITUS WITH DIABETIC PERIPHERAL ANGIOPATHY WITHOUT GANGRENE, WITH LONG-TERM CURRENT USE OF INSULIN (HCC): ICD-10-CM

## 2020-07-01 DIAGNOSIS — E13.51 PERIPHERAL VASCULAR DISEASE DUE TO SECONDARY DIABETES (HCC): ICD-10-CM

## 2020-07-01 RX ORDER — ATORVASTATIN CALCIUM 10 MG/1
TABLET, FILM COATED ORAL
COMMUNITY
Start: 2020-06-26 | End: 2020-07-24 | Stop reason: SDUPTHER

## 2020-07-01 RX ORDER — LOSARTAN POTASSIUM 50 MG/1
TABLET ORAL
COMMUNITY
Start: 2020-06-26 | End: 2021-05-26

## 2020-07-01 RX ORDER — APIXABAN 2.5 MG/1
TABLET, FILM COATED ORAL
COMMUNITY
Start: 2020-06-26 | End: 2020-08-06 | Stop reason: SDUPTHER

## 2020-07-01 RX ORDER — CARVEDILOL 25 MG/1
25 TABLET ORAL 2 TIMES DAILY
COMMUNITY
Start: 2020-06-26

## 2020-07-01 RX ORDER — CLONIDINE HYDROCHLORIDE 0.2 MG/1
TABLET ORAL 2 TIMES DAILY
COMMUNITY
Start: 2020-06-26 | End: 2021-05-28 | Stop reason: SDUPTHER

## 2020-07-01 NOTE — PROGRESS NOTES
4 sisters, some cousins on her father's side  Last mammogram 4/2016 and normal   Shabana Taylor is a 66 y.o. female who was seen by synchronous (real-time) audio-video technology on 7/1/2020. Consent: Shabana Taylor, who was seen by synchronous (real-time) audio-video technology, and/or her healthcare decision maker, is aware that this patient-initiated, Telehealth encounter on 7/1/2020 is a billable service, with coverage as determined by her insurance carrier. She is aware that she may receive a bill and has provided verbal consent to proceed: Yes. Assessment & Plan:   Diagnoses and all orders for this visit:    1. Cerebrovascular accident (CVA) due to embolism of cerebral artery ([address]    2. Controlled type 2 diabetes mellitus with diabetic peripheral angiopathy without gangrene, with long-term current use of insulin ([address]    3. Peripheral vascular disease due to secondary diabetes ([address]    4. Unilateral AKA, left (HCC)      Blood sugars are staying in good range with current medications. Discussed continued monitoring and if averaging more than 140 or more than 120 in the morning, to call. Reviewed diagnosis of embolic stroke, PFO, NSTEMI and reason why cardiologist has her on a monitor to screen for afib. Taking blood thinner appropriately. Advised that duration of this will depend on further clarification of diagnosis. No further symptoms of bladder infection, patient was not aware this was part of her diagnosis. Completed antibiotics. Encouraged improved oral intake. Concern for malnutrition while at the hospital. Currently working with nutritionist.     Will get labs done with cardiologist tomorrow. If not, will go to [facility] to complete. Previously ordered labs. Follow-up and Dispositions    · Return in about 2 months (around 9/14/2020) for medicare wellness, follow-up diabetes, in office. (Please also see details in patient instructions)  Subjective:    Shabana Tyalor is a 66 y.o. female who was seen for Hospital Follow Up [facility]) and Medication Evaluation    Admitted to hospital. 6/22-6/26 ([facility])    Diagnosis:   1) Acute emoblic CVA (bilateral scattered across several cerebral artery distributions). . Neurology followed, completed hepartin drip. dicharged on eliquis    2) NSTEmi type II. Peak troponin 0.028  3) UTI  4) noted to have poor oral itnake. Reviewed labs and studies including:   TTE: + for PFO  LE doppler negative for PE     spect perfusion study:   Abnormal myocardial SPECT perfusion study. 2. Infarct is noted in the inferior wall from the base to the mid wall, in the lateral wall from the base to the distal mid wall. There is no  obvious devan-infarct ischemia. 3. Abnormal wall motion with mild hypokinesis of the lateral wall from the base to the distal mid well, corresponding with the area of  infarct noted previously. 4. Otherwise preserved ejection fraction with a calculated LVEF of 70%    Has appointment with Dr. Little Gipson, cardiology tomorrow. Dr. Caro Franklin on 17th. Patient is checking blood pressure at home 148/70, 156/57. On upper arm. Plans to get lab work tomorrow afternoon. No new swelling. Is following a low sodium diet. Has been working with dietician for     Blood sugars: 113, 134, 159 (29th). 119, 108, 135 (30th), 106 this morning. Prior to Admission medications    Medication Sig Start Date End Date Taking? Authorizing Provider   Eliquis 2.5 mg tablet TK 1 T PO  BID 6/26/20  Yes Provider, Historical   atorvastatin (LIPITOR) 10 mg tablet TK 1 T PO QD 6/26/20  Yes Provider, Historical   carvediloL (COREG) 25 mg tablet  6/26/20  Yes Provider, Historical   cloNIDine HCL (CATAPRES) 0.2 mg tablet two (2) times a day. 6/26/20  Yes Provider, Historical   losartan (COZAAR) 50 mg tablet TK 1 T PO BID 6/26/20  Yes Provider, Historical   glipiZIDE (GLUCOTROL) 5 mg tablet Take 0.5 Tabs by mouth daily.  With breakfast 5/6/20  Yes Tobin Elliott MD   gabapentin (NEURONTIN) 100 mg capsule Take 1 Cap by mouth daily. Max Daily Amount: 100 mg. 5/6/20  Yes Claudette Falcon MD   glucose blood VI test strips (TRUE METRIX GLUCOSE TEST STRIP) strip TEST BLOOD SUGAR ON AVERAGE 3 TIMES DAILY. 2/11/20  Yes Claudette Falcon MD   lancets (PRODIGY TWIST TOP LANCET) 28 gauge misc To measure blood sugar 3-4 times daily. Dx code E 11.51, Z79.4 10/17/18  Yes Claudette Falcon MD   Blood-Glucose Meter (TRUE METRIX AIR GLUCOSE METER) monitoring kit Use to test blood sugar on average 3 times per day. Dx code: E11.51 and Z79.4 10/9/18  Yes Claudette Falcon MD   ALCOHOL PREP PADS padm  7/11/18  Yes Provider, Historical   gum/pva/dental adhesive ([address] Neel Diaz DT)  7/11/18  Yes Provider, Historical   furosemide (LASIX) 40 mg tablet TAKE 1 TABLET EVERY DAY 6/1/20   Claudette Falcon MD   amLODIPine (NORVASC) 5 mg tablet TAKE 1 TABLET EVERY DAY 4/28/20   Claudette Falcon MD   metoprolol succinate (TOPROL-XL) 50 mg XL tablet TAKE 1 TABLET EVERY DAY 4/28/20   Claudette Falcon MD   cloNIDine HCL (CATAPRES) 0.1 mg tablet Take 1 Tab by mouth two (2) times a day. 4/27/20   Claudette Falcon MD   Vitamin D3 50 mcg (2,000 unit) tab TAKE 1 TABLET BY MOUTH EVERY DAY  Patient taking differently: Indications: unsure if they should continue this 4/22/20   Claudette Falcon MD   hydrALAZINE (APRESOLINE) 10 mg tablet TAKE 1 TABLET THREE TIMES DAILY 3/18/19   Claudette Falcon MD   lisinopril (PRINIVIL, ZESTRIL) 20 mg tablet Take 20 mg by mouth daily. 10/23/17   Provider, Historical     No Known Allergies    Social History     Tobacco Use    Smoking status: Former Smoker     Years: 15.00    Smokeless tobacco: Never Used   Substance Use Topics    Alcohol use: No     Review of Systems   Constitutional: Negative for chills, fever and malaise/fatigue. HENT: Negative for congestion. Respiratory: Negative for cough and shortness of breath. Cardiovascular: Negative for chest pain.      Objective:   Vital Signs: (As obtained by patient/caregiver at home)  Visit Vitals  LMP  (LMP Unknown)      PHYSICAL EXAMINATION:  Constitutional: [x] Appears well-developed and well-nourished [x] No apparent distress      Mental status: [x] Alert and awake  [x] Oriented to person/place/time [x] Able to follow commands      Eyes:   EOM    [x]  Normal      Sclera  [x]  Normal              Discharge [x]  None visible       HENT: [x] Normocephalic, atraumatic    [x] Mouth/Throat: Mucous membranes are moist    External Ears [x] Normal      Neck: [x] No visualized mass     Pulmonary/Chest: [x] Respiratory effort normal   [x] No visualized signs of difficulty breathing or respiratory distress         Musculoskeletal:    [x] Normal range of motion of neck        Neurological:        [x] No Facial Asymmetry (Cranial nerve 7 motor function) (limited exam due to video visit)          [x] No gaze palsy              Skin:        [x] No significant exanthematous lesions or discoloration noted on facial skin              Psychiatric:       [x] Normal Affect        [x] Normal behavior    Other pertinent observable physical exam findings:- none    We discussed the expected course, resolution and complications of the diagnosis(es) in detail. Medication risks, benefits, costs, interactions, and alternatives were discussed as indicated. I advised her to contact the office if her condition worsens, changes or fails to improve as anticipated. She expressed understanding with the diagnosis(es) and plan. Rafa Dos Santos is a 66 y.o. female who was evaluated by a video visit encounter for concerns as above. Patient identification was verified prior to start of the visit. A caregiver was present when appropriate. Due to this being a TeleHealth encounter (During [place] [address] public [facility] emergency), evaluation of the following organ systems was limited: Vitals/Constitutional/EENT/Resp/CV/GI//MS/Neuro/Skin/Heme-Lymph-Imm.   Pursuant to the emergency declaration under the [facility] [address] [facility] authority and the [employer] Act, this Virtual  Visit was conducted, with patient's (and/or legal guardian's) consent, to reduce the patient's risk of exposure to COVID-19 and provide necessary medical care. Services were provided through a video synchronous discussion virtually to substitute for in-person clinic visit. Patient was located at their home. Provider was in office.      Gisell Gonzalez MD

## 2020-07-01 NOTE — PROGRESS NOTES
Virtual - reports does not think scale is accurate, plans to by new one today      Chief Complaint   Patient presents with   [facility] Follow Up    Medication Evaluation     1. Have you been to the ER, urgent care clinic since your last visit? Hospitalized since your last visit? [facility] [place]     2. Have you seen or consulted any other health care providers outside of the [address] [place] [address] since your last visit? Include any pap smears or colon screening.  No scheduled to see cardiologist tomorrow -     Health Maintenance Due   Topic Date Due    DTaP/Tdap/Td series (1 - Tdap) 07/18/1964    Shingrix Vaccine Age 50> (1 of 2) 07/18/1993    Lipid Screen  01/28/2020       Learning Assessment 6/20/2017   PRIMARY LEARNER Patient   PRIMARY LANGUAGE ENGLISH   LEARNER PREFERENCE PRIMARY READING   ANSWERED BY patient   RELATIONSHIP SELF

## 2020-07-09 ENCOUNTER — TELEPHONE (OUTPATIENT)
Dept: INTERNAL MEDICINE CLINIC | Age: 75
End: 2020-07-09

## 2020-07-09 LAB
BASOPHILS # BLD AUTO: 0 X10E3/UL (ref 0–0.2)
BASOPHILS NFR BLD AUTO: 0 %
BUN SERPL-MCNC: 53 MG/DL (ref 8–27)
BUN/CREAT SERPL: 28 (ref 12–28)
CALCIUM SERPL-MCNC: 8.8 MG/DL (ref 8.7–10.3)
CHLORIDE SERPL-SCNC: 110 MMOL/L (ref 96–106)
CO2 SERPL-SCNC: 17 MMOL/L (ref 20–29)
CREAT SERPL-MCNC: 1.87 MG/DL (ref 0.57–1)
EOSINOPHIL # BLD AUTO: 0.2 X10E3/UL (ref 0–0.4)
EOSINOPHIL NFR BLD AUTO: 2 %
ERYTHROCYTE [DISTWIDTH] IN BLOOD BY AUTOMATED COUNT: 13.3 % (ref 11.7–15.4)
GLUCOSE SERPL-MCNC: 211 MG/DL (ref 65–99)
HCT VFR BLD AUTO: 32.6 % (ref 34–46.6)
HGB BLD-MCNC: 10.2 G/DL (ref 11.1–15.9)
IMM GRANULOCYTES # BLD AUTO: 0 X10E3/UL (ref 0–0.1)
IMM GRANULOCYTES NFR BLD AUTO: 0 %
LYMPHOCYTES # BLD AUTO: 3.1 X10E3/UL (ref 0.7–3.1)
LYMPHOCYTES NFR BLD AUTO: 32 %
MCH RBC QN AUTO: 26.3 PG (ref 26.6–33)
MCHC RBC AUTO-ENTMCNC: 31.3 G/DL (ref 31.5–35.7)
MCV RBC AUTO: 84 FL (ref 79–97)
MONOCYTES # BLD AUTO: 0.6 X10E3/UL (ref 0.1–0.9)
MONOCYTES NFR BLD AUTO: 6 %
NEUTROPHILS # BLD AUTO: 5.9 X10E3/UL (ref 1.4–7)
NEUTROPHILS NFR BLD AUTO: 60 %
PLATELET # BLD AUTO: 193 X10E3/UL (ref 150–450)
POTASSIUM SERPL-SCNC: 7 MMOL/L (ref 3.5–5.2)
RBC # BLD AUTO: 3.88 X10E6/UL (ref 3.77–5.28)
SODIUM SERPL-SCNC: 140 MMOL/L (ref 134–144)
WBC # BLD AUTO: 9.8 X10E3/UL (ref 3.4–10.8)

## 2020-07-09 NOTE — TELEPHONE ENCOUNTER
Hyperkalemia. Called patient. She is feeling well this morning. No new concerns. Discussed just water, no juice or other fruit this morning. She will go to ER with her son Mary Dalal. I also called Mary Dalal and advised him. He stated they will go to [address] [place] [address] ER. Called and notified them that I am sending the patient. Ny Rolle MD    Repeat potassium in ER 6.4. Patient admitted to hospital for monitoring and treatment.      Ny Rolle MD

## 2020-07-09 NOTE — TELEPHONE ENCOUNTER
On call note:    Late entry due to [facility] not accessible through  overnight.     [facility] called value of 7.0 for potassium around 3:30 am.    Forward to PCP to arrange repeat testing and f/u

## 2020-07-24 NOTE — TELEPHONE ENCOUNTER
----- Message from Mini Santana Page sent at 2020  2:26 PM EDT -----  Regarding: Dr. Shaince Falcon Refill  Medication Refill  To:   :  [facility]-     ID numbers:#[id number] [id number]    Caller (if not patient): Carmine Fothergill (CHILD)  Relationship of caller (if not patient):  Best contact number(s): [phone]  Name of medication and dosage if known:  Atorvastatin 10mg  Is patient out of this medication (yes/no): Yes  Pharmacy name:  [facility] Address:   Pharmacy listed in chart? (yes/no):   Pharmacy phone number: Phone:   Date of last visit:   Wednesday, 2020 09:00 AM

## 2020-07-24 NOTE — TELEPHONE ENCOUNTER
Historical medication: Lipitor 10 mg      Last visit 07/01/2020 MD Devan Rosario   Next appointment 2 months (09/2020)      Requested Prescriptions     Pending Prescriptions Disp Refills    atorvastatin (LIPITOR) 10 mg tablet 90 Tab 0     Sig: Take 1 Tab by mouth daily.

## 2020-07-27 RX ORDER — ATORVASTATIN CALCIUM 10 MG/1
10 TABLET, FILM COATED ORAL DAILY
Qty: 90 TAB | Refills: 0 | Status: SHIPPED | OUTPATIENT
Start: 2020-07-27 | End: 2020-07-28 | Stop reason: SDUPTHER

## 2020-07-27 NOTE — TELEPHONE ENCOUNTER
Pt's son Edy Solorzano) on HIPPA called to check status of pt's Lipitor 10 mg prescription. Informed Edy Solorzano) that it's still waiting on 's approval.(Jay) state's that she will be taking her last pill today,he would like if another provider could approve the prescription. Prescription can be sent to [facility] on file.

## 2020-07-27 NOTE — TELEPHONE ENCOUNTER
This is a medication started by [facility] EMERGENCY [facility], patient now needs refill is out of medication.  Had recent virtual appointment, follow up not due till 9/2020

## 2020-07-28 DIAGNOSIS — I63.40 CEREBROVASCULAR ACCIDENT (CVA) DUE TO EMBOLISM OF CEREBRAL ARTERY (HCC): Primary | ICD-10-CM

## 2020-07-28 NOTE — TELEPHONE ENCOUNTER
Please re send rx to local pharmacy [facility] at [address] [facility]. Please call when rx has been sent.  Pt took last dose on Sunday [phone]

## 2020-07-30 RX ORDER — ATORVASTATIN CALCIUM 10 MG/1
10 TABLET, FILM COATED ORAL DAILY
Qty: 30 TAB | Refills: 0 | Status: SHIPPED | OUTPATIENT
Start: 2020-07-30 | End: 2020-08-02 | Stop reason: SDUPTHER

## 2020-08-01 DIAGNOSIS — I63.40 CEREBROVASCULAR ACCIDENT (CVA) DUE TO EMBOLISM OF CEREBRAL ARTERY (HCC): ICD-10-CM

## 2020-08-02 RX ORDER — ATORVASTATIN CALCIUM 10 MG/1
TABLET, FILM COATED ORAL
Qty: 90 TAB | Refills: 3 | OUTPATIENT
Start: 2020-08-02

## 2020-08-02 RX ORDER — ATORVASTATIN CALCIUM 10 MG/1
10 TABLET, FILM COATED ORAL DAILY
Qty: 90 TAB | Refills: 3 | Status: SHIPPED | OUTPATIENT
Start: 2020-08-02 | End: 2021-11-01

## 2020-08-06 DIAGNOSIS — I63.40 CEREBROVASCULAR ACCIDENT (CVA) DUE TO EMBOLISM OF CEREBRAL ARTERY (HCC): Primary | ICD-10-CM

## 2020-08-06 NOTE — TELEPHONE ENCOUNTER
Patients son See Luke left a voicemail requesting refills on his moms Eliquis & Norvasc. BCN: [phone]. Norvasc was dc'd on 04/28/2020 reason: not a current medication. Historical medication: Eliquis 2.5 mg.     Please review and sign if appropriate. Last visit 07/01/2020 Virtual visit MD Elmer Quintana   Next appointment 2 months (09/2020)       Requested Prescriptions     Pending Prescriptions Disp Refills    Eliquis 2.5 mg tablet 180 Tab 0     Sig: Take 1 Tab by mouth two (2) times a day.  amLODIPine (NORVASC) 5 mg tablet 90 Tab 0     Sig: Take 1 Tab by mouth daily.

## 2020-08-07 RX ORDER — AMLODIPINE BESYLATE 5 MG/1
5 TABLET ORAL DAILY
Qty: 90 TAB | Refills: 0 | OUTPATIENT
Start: 2020-08-07

## 2020-08-07 RX ORDER — APIXABAN 2.5 MG/1
2.5 TABLET, FILM COATED ORAL 2 TIMES DAILY
Qty: 180 TAB | Refills: 0 | Status: SHIPPED | OUTPATIENT
Start: 2020-08-07 | End: 2022-04-29 | Stop reason: SDUPTHER

## 2020-08-07 NOTE — TELEPHONE ENCOUNTER
Medication changes made post-dicharge for emoblic stroke. At last visit was due to follow-up with neurology as well as cardiologioly   Neurology advised to continue eliquis, clonidine, carvedilol and amlodipine at last visit on 7/17    Patient cardiology note did not include amlodipine. There seems to be a med rec issue. Called patient son. He will double check medicine bottles. Curtesy refill of eliquis added.    Ian Sam MD

## 2021-02-22 RX ORDER — AMLODIPINE BESYLATE 5 MG/1
TABLET ORAL
Qty: 90 TAB | Refills: 0 | Status: SHIPPED | OUTPATIENT
Start: 2021-02-22 | End: 2021-05-28 | Stop reason: SDUPTHER

## 2021-02-26 ENCOUNTER — OFFICE VISIT (OUTPATIENT)
Dept: INTERNAL MEDICINE CLINIC | Age: 76
End: 2021-02-26
Payer: MEDICARE

## 2021-02-26 VITALS
HEIGHT: 66 IN | OXYGEN SATURATION: 98 % | HEART RATE: 68 BPM | TEMPERATURE: 97.6 F | WEIGHT: 120 LBS | RESPIRATION RATE: 18 BRPM | DIASTOLIC BLOOD PRESSURE: 68 MMHG | SYSTOLIC BLOOD PRESSURE: 164 MMHG | BODY MASS INDEX: 19.29 KG/M2

## 2021-02-26 DIAGNOSIS — L03.119 CELLULITIS OF LOWER EXTREMITY, UNSPECIFIED LATERALITY: Primary | ICD-10-CM

## 2021-02-26 DIAGNOSIS — E11.51 CONTROLLED TYPE 2 DIABETES MELLITUS WITH DIABETIC PERIPHERAL ANGIOPATHY WITHOUT GANGRENE, WITH LONG-TERM CURRENT USE OF INSULIN (HCC): ICD-10-CM

## 2021-02-26 DIAGNOSIS — I10 ESSENTIAL HYPERTENSION: ICD-10-CM

## 2021-02-26 DIAGNOSIS — E13.51 PERIPHERAL VASCULAR DISEASE DUE TO SECONDARY DIABETES (HCC): ICD-10-CM

## 2021-02-26 DIAGNOSIS — N18.30 STAGE 3 CHRONIC KIDNEY DISEASE, UNSPECIFIED WHETHER STAGE 3A OR 3B CKD (HCC): ICD-10-CM

## 2021-02-26 DIAGNOSIS — S78.112A UNILATERAL AKA, LEFT (HCC): ICD-10-CM

## 2021-02-26 DIAGNOSIS — E11.21 DIABETIC NEPHROPATHY ASSOCIATED WITH TYPE 2 DIABETES MELLITUS (HCC): ICD-10-CM

## 2021-02-26 DIAGNOSIS — E11.42 DIABETIC POLYNEUROPATHY ASSOCIATED WITH TYPE 2 DIABETES MELLITUS (HCC): ICD-10-CM

## 2021-02-26 DIAGNOSIS — Z79.4 CONTROLLED TYPE 2 DIABETES MELLITUS WITH DIABETIC PERIPHERAL ANGIOPATHY WITHOUT GANGRENE, WITH LONG-TERM CURRENT USE OF INSULIN (HCC): ICD-10-CM

## 2021-02-26 DIAGNOSIS — L01.03 BULLOUS IMPETIGO: ICD-10-CM

## 2021-02-26 LAB — HBA1C MFR BLD HPLC: 6.1 %

## 2021-02-26 PROCEDURE — G8754 DIAS BP LESS 90: HCPCS | Performed by: INTERNAL MEDICINE

## 2021-02-26 PROCEDURE — 1101F PT FALLS ASSESS-DOCD LE1/YR: CPT | Performed by: INTERNAL MEDICINE

## 2021-02-26 PROCEDURE — G8753 SYS BP > OR = 140: HCPCS | Performed by: INTERNAL MEDICINE

## 2021-02-26 PROCEDURE — G8420 CALC BMI NORM PARAMETERS: HCPCS | Performed by: INTERNAL MEDICINE

## 2021-02-26 PROCEDURE — 1090F PRES/ABSN URINE INCON ASSESS: CPT | Performed by: INTERNAL MEDICINE

## 2021-02-26 PROCEDURE — G8536 NO DOC ELDER MAL SCRN: HCPCS | Performed by: INTERNAL MEDICINE

## 2021-02-26 PROCEDURE — G8510 SCR DEP NEG, NO PLAN REQD: HCPCS | Performed by: INTERNAL MEDICINE

## 2021-02-26 PROCEDURE — 83036 HEMOGLOBIN GLYCOSYLATED A1C: CPT | Performed by: INTERNAL MEDICINE

## 2021-02-26 PROCEDURE — G8427 DOCREV CUR MEDS BY ELIG CLIN: HCPCS | Performed by: INTERNAL MEDICINE

## 2021-02-26 PROCEDURE — 99214 OFFICE O/P EST MOD 30 MIN: CPT | Performed by: INTERNAL MEDICINE

## 2021-02-26 RX ORDER — DOXYCYCLINE 100 MG/1
100 CAPSULE ORAL 2 TIMES DAILY
Qty: 14 CAP | Refills: 0 | Status: SHIPPED | OUTPATIENT
Start: 2021-02-26 | End: 2021-03-05

## 2021-02-26 RX ORDER — MUPIROCIN 20 MG/G
OINTMENT TOPICAL 2 TIMES DAILY
Qty: 22 G | Refills: 0 | Status: SHIPPED | OUTPATIENT
Start: 2021-02-26 | End: 2021-05-26 | Stop reason: SDUPTHER

## 2021-02-26 NOTE — PROGRESS NOTES
HPI:  established patient  Presents for f/u leg lesion    Accompanied by son. Blister lesions on right lower leg  No known injury reported. Some redness, tenderness  No fevers    Hypoglycemia at times    BP at home usually 120-130's/60-80's    No CP, SOB, or new neuro sx      Past medical, Social, and Family history reviewed    Prior to Admission medications    Medication Sig Start Date End Date Taking? Authorizing Provider   amLODIPine (NORVASC) 5 mg tablet TAKE 1 TABLET EVERY DAY 2/22/21  Yes Ricky Ramirez MD   Eliquis 2.5 mg tablet Take 1 Tab by mouth two (2) times a day. 8/7/20  Yes Ricky Ramirez MD   atorvastatin (LIPITOR) 10 mg tablet Take 1 Tab by mouth daily. 8/2/20  Yes Ricky Ramirez MD   carvediloL (COREG) 25 mg tablet  6/26/20  Yes Provider, Historical   cloNIDine HCL (CATAPRES) 0.2 mg tablet two (2) times a day. 6/26/20  Yes Provider, Historical   losartan (COZAAR) 50 mg tablet TK 1 T PO BID 6/26/20  Yes Provider, Historical   glipiZIDE (GLUCOTROL) 5 mg tablet Take 0.5 Tabs by mouth daily. With breakfast 5/6/20  Yes Ricky Ramirez MD   gabapentin (NEURONTIN) 100 mg capsule Take 1 Cap by mouth daily. Max Daily Amount: 100 mg. 5/6/20  Yes Ricky Ramirez MD   Vitamin D3 50 mcg (2,000 unit) tab TAKE 1 TABLET BY MOUTH EVERY DAY  Patient taking differently: Indications: unsure if they should continue this 4/22/20  Yes Ricky Ramirez MD   glucose blood VI test strips (TRUE METRIX GLUCOSE TEST STRIP) strip TEST BLOOD SUGAR ON AVERAGE 3 TIMES DAILY. 2/11/20  Yes Ricky Ramirez MD   lancets (PRODIGY TWIST TOP LANCET) 28 gauge misc To measure blood sugar 3-4 times daily. Dx code E 11.51, Z79.4 10/17/18  Yes Ricky Ramirez MD   Blood-Glucose Meter (TRUE METRIX AIR GLUCOSE METER) monitoring kit Use to test blood sugar on average 3 times per day.  Dx code: E11.51 and Z79.4 10/9/18  Yes Ricky Ramirez MD   gum/pva/dental adhesive [facility] DENTURE ADHESIVE DT) 7/11/18  Yes Provider, Historical   ALCOHOL PREP PADS padm  7/11/18   Provider, Historical          ROS  Complete ROS reviewed and negative or stable except as noted in HPI. Physical Exam  Vitals signs and nursing note reviewed. Constitutional:       General: She is not in acute distress. HENT:      Head: Normocephalic and atraumatic. Eyes:      General: No scleral icterus. Pupils: Pupils are equal, round, and reactive to light. Neck:      Musculoskeletal: Normal range of motion and neck supple. Cardiovascular:      Rate and Rhythm: Normal rate. Pulmonary:      Effort: Pulmonary effort is normal. No respiratory distress. Abdominal:      General: There is no distension. Palpations: Abdomen is soft. Tenderness: There is no abdominal tenderness. Musculoskeletal: Normal range of motion. Skin:     General: Skin is warm. Findings: Erythema (mild, lower right leg) and lesion (escar lesions right lower leg) present. No rash. Neurological:      Mental Status: She is alert and oriented to person, place, and time. Motor: No abnormal muscle tone. Comments: Seated in wheelchair       photo of bullous lesions and erythema on right lower leg from earlier in the week reviewed      Prior labs reviewed. POC HgbA1C 6.1 today      Assessment/Plan:    Favor abrasion and local edema  Consider bullous impetigo  +/- low grade cellulitis      ICD-10-CM ICD-9-CM    1. Cellulitis of lower extremity, unspecified laterality  L03.119 682.6 doxycycline (MONODOX) 100 mg capsule   2. Controlled type 2 diabetes mellitus with diabetic peripheral angiopathy without gangrene, with long-term current use of insulin ([facility])  E11.51 250.70 AMB POC HEMOGLOBIN A1C    Z79.4 443.81      V58.67    3. Bullous impetigo  L01.03 684 mupirocin (BACTROBAN) 2 % ointment   4. Peripheral vascular disease due to secondary diabetes ([facility] [address]  E13.51 249.70      443.81    5. Unilateral AKA, left ([facility]  S78.112A 897.2    6. Essential hypertension  I10 401.9    7. Diabetic nephropathy associated with type 2 diabetes mellitus (HCC)  E11.21 250.40      583.81    8. Diabetic polyneuropathy associated with type 2 diabetes mellitus (HCC)  E11.42 250.60      357.2    9. Stage 3 chronic kidney disease, unspecified whether stage 3a or 3b CKD  N18.30 585. 3      Follow-up and Dispositions    · Return in about 3 months (around 5/26/2021), or if symptoms worsen or fail to improve, for blood pressure, diabetes. results and schedule of future studies reviewed with patient, son  reviewed diet  and weight    reviewed medications and side effects in detail    Home BP monitoring   Dc glipizide  Mupirocin  Doxycycline x 7 days  Continue other current medications       An electronic signature was used to authenticate this note.   -- Gloria Alberto MD

## 2021-02-26 NOTE — PROGRESS NOTES
RM: 14  Patient states she has a concern about glipizide. patient states her sugar is low every morning and wants to know if she should take every other day? Refill needed on lancets  Chief Complaint   Patient presents with    Skin Problem     f/u patient has puss like substance under right leg chin. Site healed and now has redness and scabs at the site. Visit Vitals  BP (!) 176/75 (BP 1 Location: Left upper arm, BP Patient Position: Sitting, BP Cuff Size: Adult)   Pulse 68   Temp 97.6 °F (36.4 °C) (Temporal)   Resp 18   Ht 5' 5.5\" (1.664 m)   Wt 120 lb (54.4 kg)   LMP  (LMP Unknown)   SpO2 98%   BMI 19.67 kg/m²     Recent Travel Screening and Travel History documentation     Travel Screening     Question   Response    In the last month, have you been in contact with someone who was confirmed or suspected to have Coronavirus / COVID-19? No / Unsure    Have you had a COVID-19 viral test in the last 14 days? No    Do you have any of the following new or worsening symptoms? None of these    Have you traveled internationally in the last month? No      Travel History   Travel since 01/26/21     No documented travel since 01/26/21               1. Have you been to the ER, urgent care clinic since your last visit? Hospitalized since your last visit? No    2. Have you seen or consulted any other health care providers outside of the [address] [place] [address] since your last visit? Include any pap smears or colon screening.  No     Health Maintenance Due   Topic Date Due    Hepatitis C Screening  1945    COVID-19 Vaccine (1 of 2) 07/18/1959    DTaP/Tdap/Td series (1 - Tdap) 07/18/1964    Shingrix Vaccine Age 50> (1 of 2) 07/18/1993    Lipid Screen  01/28/2020    Flu Vaccine (1) 09/01/2020    Medicare Yearly Exam  09/10/2020    Foot Exam Q1  09/10/2020    MICROALBUMIN Q1  09/10/2020    GLAUCOMA SCREENING Q2Y  09/13/2020    Eye Exam Retinal or Dilated  09/13/2020        Learning Assessment 6/20/2017 PRIMARY LEARNER Patient   PRIMARY LANGUAGE ENGLISH   LEARNER PREFERENCE PRIMARY READING   ANSWERED BY patient   RELATIONSHIP SELF

## 2021-03-03 DIAGNOSIS — G62.9 NEUROPATHY: ICD-10-CM

## 2021-03-03 RX ORDER — GABAPENTIN 100 MG/1
100 CAPSULE ORAL DAILY
Qty: 30 CAP | Refills: 0 | Status: SHIPPED | OUTPATIENT
Start: 2021-03-03 | End: 2021-05-26

## 2021-03-03 RX ORDER — GABAPENTIN 100 MG/1
100 CAPSULE ORAL DAILY
Qty: 90 CAP | Refills: 1 | Status: SHIPPED | OUTPATIENT
Start: 2021-03-03 | End: 2021-05-28

## 2021-03-03 NOTE — TELEPHONE ENCOUNTER
----- Message from Blake Varela sent at 3/2/2021  6:12 PM EST -----  Regarding: Dr. Navdeep Rico (if not patient): Danita Vang      Relationship of caller (if not patient):  Self      Best contact number(s):  [phone]      Name of medication and dosage if known:  Gabapentin      Is patient out of this medication (yes/no):  Yes      Pharmacy name:  [facility] listed in chart? (yes/no):  Pharmacy phone number:  [phone]      Details to clarify the request:  Pt is requesting a refill of her medication due to her prescription have .     Thanks,  Blake Varela

## 2021-03-31 DIAGNOSIS — I63.40 CEREBROVASCULAR ACCIDENT (CVA) DUE TO EMBOLISM OF CEREBRAL ARTERY (HCC): ICD-10-CM

## 2021-03-31 RX ORDER — CLONIDINE HYDROCHLORIDE 0.2 MG/1
0.2 TABLET ORAL 2 TIMES DAILY
Qty: 180 TAB | Refills: 0 | Status: CANCELLED | OUTPATIENT
Start: 2021-03-31

## 2021-03-31 RX ORDER — APIXABAN 2.5 MG/1
2.5 TABLET, FILM COATED ORAL 2 TIMES DAILY
Qty: 180 TAB | Refills: 0 | Status: CANCELLED | OUTPATIENT
Start: 2021-03-31

## 2021-03-31 NOTE — TELEPHONE ENCOUNTER
[address] is requesting new prescriptions on behalf of the patient. Historical medication: Catapres 0.2 mg     Last visit 02/26/2021 MD Ramakrishna Borjas   Next appointment 05/26/2021 MD Papo Portillo   Previous refill encounter(s)   08/07/2020 Eliquis #[id number]     Requested Prescriptions     Pending Prescriptions Disp Refills    cloNIDine HCL (CATAPRES) 0.2 mg tablet 180 Tab 0     Sig: Take 1 Tab by mouth two (2) times a day.  Eliquis 2.5 mg tablet 180 Tab 0     Sig: Take 1 Tab by mouth two (2) times a day.

## 2021-04-02 NOTE — TELEPHONE ENCOUNTER
Patient cardiologist Benton Trevizo, typically managing clonidine. Called to verify this and carvedilol, and losartan all on refill. Spoke to patient and then Cinthia Kiran her son. He wanted to call Dr. Benton Trevizo to request refills. He mentioned he also just got eliquis refill from Dr. Benton Trevizo.      Cancelled both refills    Fabian Luu MD

## 2021-05-09 RX ORDER — GLIPIZIDE 5 MG/1
TABLET ORAL
Qty: 45 TAB | Refills: 1 | Status: SHIPPED | OUTPATIENT
Start: 2021-05-09 | End: 2021-05-26 | Stop reason: DRUGHIGH

## 2021-05-26 ENCOUNTER — OFFICE VISIT (OUTPATIENT)
Dept: INTERNAL MEDICINE CLINIC | Age: 76
End: 2021-05-26
Payer: MEDICARE

## 2021-05-26 VITALS
OXYGEN SATURATION: 98 % | TEMPERATURE: 97.8 F | RESPIRATION RATE: 18 BRPM | HEIGHT: 66 IN | HEART RATE: 60 BPM | DIASTOLIC BLOOD PRESSURE: 75 MMHG | WEIGHT: 123 LBS | BODY MASS INDEX: 19.77 KG/M2 | SYSTOLIC BLOOD PRESSURE: 135 MMHG

## 2021-05-26 DIAGNOSIS — Z79.4 CONTROLLED TYPE 2 DIABETES MELLITUS WITH DIABETIC PERIPHERAL ANGIOPATHY WITHOUT GANGRENE, WITH LONG-TERM CURRENT USE OF INSULIN (HCC): ICD-10-CM

## 2021-05-26 DIAGNOSIS — E11.42 DIABETIC POLYNEUROPATHY ASSOCIATED WITH TYPE 2 DIABETES MELLITUS (HCC): ICD-10-CM

## 2021-05-26 DIAGNOSIS — R19.5 DARK STOOLS: ICD-10-CM

## 2021-05-26 DIAGNOSIS — Z00.00 MEDICARE ANNUAL WELLNESS VISIT, SUBSEQUENT: Primary | ICD-10-CM

## 2021-05-26 DIAGNOSIS — Z11.59 NEED FOR HEPATITIS C SCREENING TEST: ICD-10-CM

## 2021-05-26 DIAGNOSIS — R23.8 SKIN BULLA: ICD-10-CM

## 2021-05-26 DIAGNOSIS — E11.51 CONTROLLED TYPE 2 DIABETES MELLITUS WITH DIABETIC PERIPHERAL ANGIOPATHY WITHOUT GANGRENE, WITH LONG-TERM CURRENT USE OF INSULIN (HCC): ICD-10-CM

## 2021-05-26 DIAGNOSIS — H91.93 BILATERAL HEARING LOSS, UNSPECIFIED HEARING LOSS TYPE: ICD-10-CM

## 2021-05-26 DIAGNOSIS — I10 ESSENTIAL HYPERTENSION: ICD-10-CM

## 2021-05-26 DIAGNOSIS — E11.21 DIABETIC NEPHROPATHY ASSOCIATED WITH TYPE 2 DIABETES MELLITUS (HCC): ICD-10-CM

## 2021-05-26 DIAGNOSIS — S78.112A UNILATERAL AKA, LEFT (HCC): ICD-10-CM

## 2021-05-26 DIAGNOSIS — Z86.73 HISTORY OF CVA (CEREBROVASCULAR ACCIDENT): ICD-10-CM

## 2021-05-26 DIAGNOSIS — N18.32 CKD STAGE G3B/A2, GFR 30-44 AND ALBUMIN CREATININE RATIO 30-299 MG/G (HCC): ICD-10-CM

## 2021-05-26 DIAGNOSIS — E13.51 PERIPHERAL VASCULAR DISEASE DUE TO SECONDARY DIABETES (HCC): ICD-10-CM

## 2021-05-26 DIAGNOSIS — H61.23 EXCESSIVE CERUMEN IN BOTH EAR CANALS: ICD-10-CM

## 2021-05-26 PROBLEM — D72.829 LEUKOCYTOSIS: Status: RESOLVED | Noted: 2019-01-30 | Resolved: 2021-05-26

## 2021-05-26 LAB — HBA1C MFR BLD HPLC: 5.8 %

## 2021-05-26 PROCEDURE — 99213 OFFICE O/P EST LOW 20 MIN: CPT | Performed by: INTERNAL MEDICINE

## 2021-05-26 PROCEDURE — G8427 DOCREV CUR MEDS BY ELIG CLIN: HCPCS | Performed by: INTERNAL MEDICINE

## 2021-05-26 PROCEDURE — G8420 CALC BMI NORM PARAMETERS: HCPCS | Performed by: INTERNAL MEDICINE

## 2021-05-26 PROCEDURE — G0439 PPPS, SUBSEQ VISIT: HCPCS | Performed by: INTERNAL MEDICINE

## 2021-05-26 PROCEDURE — G8754 DIAS BP LESS 90: HCPCS | Performed by: INTERNAL MEDICINE

## 2021-05-26 PROCEDURE — 1090F PRES/ABSN URINE INCON ASSESS: CPT | Performed by: INTERNAL MEDICINE

## 2021-05-26 PROCEDURE — 1101F PT FALLS ASSESS-DOCD LE1/YR: CPT | Performed by: INTERNAL MEDICINE

## 2021-05-26 PROCEDURE — G8752 SYS BP LESS 140: HCPCS | Performed by: INTERNAL MEDICINE

## 2021-05-26 PROCEDURE — G8536 NO DOC ELDER MAL SCRN: HCPCS | Performed by: INTERNAL MEDICINE

## 2021-05-26 PROCEDURE — G8510 SCR DEP NEG, NO PLAN REQD: HCPCS | Performed by: INTERNAL MEDICINE

## 2021-05-26 PROCEDURE — 83036 HEMOGLOBIN GLYCOSYLATED A1C: CPT | Performed by: INTERNAL MEDICINE

## 2021-05-26 RX ORDER — MUPIROCIN 20 MG/G
OINTMENT TOPICAL 2 TIMES DAILY
Qty: 22 G | Refills: 0 | Status: SHIPPED | OUTPATIENT
Start: 2021-05-26 | End: 2021-08-29

## 2021-05-26 RX ORDER — LOSARTAN POTASSIUM 25 MG/1
TABLET ORAL
COMMUNITY
Start: 2021-05-10

## 2021-05-26 NOTE — PROGRESS NOTES
A/P:  Romulo Castro is a 66 y.o. female, she presents today for:    1. Controlled type 2 diabetes mellitus with diabetic peripheral angiopathy without gangrene, with long-term current use of insulin ([facility])  -     AMB POC HEMOGLOBIN A1C  -     LIPID PANEL; Future  2. Peripheral vascular disease due to secondary diabetes ([facility]  -     REFERRAL TO CARDIOLOGY  -     CBC WITH AUTOMATED DIFF; Future  -     REFERRAL TO PODIATRY; Future  3. Diabetic polyneuropathy associated with type 2 diabetes mellitus ([facility]  -     LIPID PANEL; Future  -     REFERRAL TO PODIATRY; Future  4. Diabetic nephropathy associated with type 2 diabetes mellitus ([facility]  -     LIPID PANEL; Future  5. CKD stage G3b/A2, GFR 30-44 and albumin creatinine ratio  mg/g ([facility])  -     CBC WITH AUTOMATED DIFF; Future  6. Unilateral AKA, left ([facility]  7. History of CVA (cerebrovascular accident)  -     LIPID PANEL; Future  -     REFERRAL TO CARDIOLOGY  8. Dark stools  -     CBC WITH AUTOMATED DIFF; Future  -     AMB POC FECAL BLOOD, OCCULT, QL 3 CARDS  9. Essential hypertension  -     LIPID PANEL; Future  10. Medicare annual wellness visit, subsequent  -     REFERRAL TO [facility] CLINICAL SPECIALIST  11. Excessive cerumen in both ear canals  -     carbamide peroxide (DEBROX) 6.5 % otic solution; Administer 5 Drops into each ear two (2) times a day., Normal, Disp-7.5 mL, R-0  -     REFERRAL TO ENT-OTOLARYNGOLOGY  12. Bilateral hearing loss, unspecified hearing loss type  -     carbamide peroxide (DEBROX) 6.5 % otic solution; Administer 5 Drops into each ear two (2) times a day., Normal, Disp-7.5 mL, R-0  -     REFERRAL TO ENT-OTOLARYNGOLOGY  13. Need for hepatitis C screening test  -     HCV AB W/RFLX TO LEBRON; Future  14. Bullous impetigo  -     mupirocin (BACTROBAN) 2 % ointment; Apply  to affected area two (2) times a day., Normal, Disp-22 g, R-0    Diabetes: blood sugars running lowish on ly 2.5 mg glipizide. - stop glipizide   - monitor blood sugars.  If running higher higher     History of CVA: emoblic - eliquis started. Neurologist Nina Nowak). No known residual effects.    - last seen in summer 2020.    - BP control, eliquis, statin    CKD stage 4. Nephropathy: following with Dr. Neris Linares. Started on low dose losartan at 25mg. Neuropathy: on gabapentin, for phantom pains of left side amputation. rom pains in left leg. History of dark stool. Unclear cause, consider bleeding.   - to complete stool card if recurs. Patient expresses understanding. Bulla on right lower extremity. - does not appear infective. Consider trauma. PAtient with severe PVD. - to follow-up with cardiology regarding right lower extremity   - use mupirocin if blister opens. Well woman (non-gyn) exam: history and exam revealed issues as noted below. Cancer screening: ' - history of normal mammograms and colonoscopy. Patient declines mammogram at this time, will continue home exams and if new concerns will contact office. Vaccine status: discussed shingrix. Cardiovascular risk: high, CVA and PVD  Known osteoporosis: follow-up with nephrology    Follow-up and Dispositions    · Return in about 3 months (around 8/26/2021) for follow-up diabetes. .         Future Appointments   Date Time Provider Inge Aburto   8/25/2021  9:30 AM Cordelia Rodriguez MD [facility] BS AMB       HPI    66year old woman with long standing diabetes and significant PVD. S/p AKA. Also nephropathy - follows with Dr. Neris Linares (nephrology) and history of CVA in June 2020. Right leg improved. Continues to use a salve. SKin will break open sometimes. Chornic constipation. Home blood sugars: highest 117- 135 within 2 hours. Fasting blood sugar often low 80s.      PMH/PSH: reviewed and updated  Sochx/Famhx: reviewed and updated     All: No Known Allergies  Med:   Current Outpatient Medications   Medication Sig    losartan (COZAAR) 25 mg tablet TAKE 1 TABLET BY MOUTH EVERY DAY    gabapentin (NEURONTIN) 100 mg capsule Take 1 Cap by mouth daily. Max Daily Amount: 100 mg.  mupirocin (BACTROBAN) 2 % ointment Apply  to affected area two (2) times a day.  amLODIPine (NORVASC) 5 mg tablet TAKE 1 TABLET EVERY DAY    Eliquis 2.5 mg tablet Take 1 Tab by mouth two (2) times a day.  atorvastatin (LIPITOR) 10 mg tablet Take 1 Tab by mouth daily.  carvediloL (COREG) 25 mg tablet     cloNIDine HCL (CATAPRES) 0.2 mg tablet two (2) times a day.  Vitamin D3 50 mcg (2,000 unit) tab TAKE 1 TABLET BY MOUTH EVERY DAY (Patient taking differently: Indications: unsure if they should continue this)    glucose blood VI test strips (TRUE METRIX GLUCOSE TEST STRIP) strip TEST BLOOD SUGAR ON AVERAGE 3 TIMES DAILY.  lancets (PRODIGY TWIST TOP LANCET) 28 gauge misc To measure blood sugar 3-4 times daily. Dx code E 11.51, Z79.4    Blood-Glucose Meter (TRUE METRIX AIR GLUCOSE METER) monitoring kit Use to test blood sugar on average 3 times per day. Dx code: E11.51 and Z79.4    ALCOHOL PREP PADS padm     gum/pva/dental adhesive (SUPER DENTURE ADHESIVE DT)      No current facility-administered medications for this visit. ROS pertinent for the following:  Review of Systems   Constitutional: Negative for chills, fever and malaise/fatigue. Respiratory: Negative for cough and shortness of breath. Cardiovascular: Negative for chest pain. Neurological: Negative for dizziness and headaches. PE:  Blood pressure 135/75, pulse 60, temperature 97.8 °F (36.6 °C), temperature source Oral, resp. rate 18, height 5' 5.5\" (1.664 m), weight 123 lb (55.8 kg), SpO2 98 %. Body mass index is 20.16 kg/m². Physical Exam  Vitals and nursing note reviewed. Constitutional:       General: She is not in acute distress. Appearance: Normal appearance. HENT:      Head: Normocephalic and atraumatic. Ears:      Comments: Bilateral cerumen occluding canal. Partially removed with cerumen spoon.       Nose: Nose normal. Mouth/Throat:      Mouth: Mucous membranes are moist.   Eyes:      Extraocular Movements: Extraocular movements intact. Conjunctiva/sclera: Conjunctivae normal.      Pupils: Pupils are equal, round, and reactive to light. Cardiovascular:      Rate and Rhythm: Normal rate and regular rhythm. Pulses: Normal pulses. Heart sounds: Normal heart sounds. Pulmonary:      Effort: Pulmonary effort is normal.      Breath sounds: Normal breath sounds. Musculoskeletal:         General: Normal range of motion. Cervical back: Normal range of motion and neck supple. Comments: Left side AKA  Right lower leg with ~ 2 cm bulla on anterior shin. Closed/intact. Fluid appears to be clear. No redness of surrounding skin. Skin:     General: Skin is warm and dry. Neurological:      General: No focal deficit present. Mental Status: She is alert and oriented to person, place, and time. Mental status is at baseline. Psychiatric:         Mood and Affect: Mood normal.         Behavior: Behavior normal.       Left:     AKA amputation  Right:   Filament test 3/6              Pulse DP: 1+ (weak)              Pulse PT: 1+ (weak)              Deformities: callous    Right LE with blistering at mid anterior shin. Labs:   See addendum for interpretation of labs resulting after time of visit. She was given AVS and expressed understanding with the diagnosis and plan as discussed. An electronic signature was used to authenticate this note. -- Mike Green MD         This is the Subsequent Medicare Annual Wellness Exam, performed 12 months or more after the Initial AWV or the last Subsequent AWV    I have reviewed the patient's medical history in detail and updated the computerized patient record. Assessment/Plan   Education and counseling provided:  Are appropriate based on today's review and evaluation    1.  Controlled type 2 diabetes mellitus with diabetic peripheral angiopathy without gangrene, with long-term current use of insulin ([facility])  -     AMB POC HEMOGLOBIN A1C  -     LIPID PANEL; Future  2. Peripheral vascular disease due to secondary diabetes ([facility]  -     REFERRAL TO CARDIOLOGY  -     CBC WITH AUTOMATED DIFF; Future  -     REFERRAL TO PODIATRY; Future  3. Diabetic polyneuropathy associated with type 2 diabetes mellitus ([facility]  -     LIPID PANEL; Future  -     REFERRAL TO PODIATRY; Future  4. Diabetic nephropathy associated with type 2 diabetes mellitus ([facility]  -     LIPID PANEL; Future  5. CKD stage G3b/A2, GFR 30-44 and albumin creatinine ratio  mg/g ([facility])  -     CBC WITH AUTOMATED DIFF; Future  6. Unilateral AKA, left ([facility]  7. History of CVA (cerebrovascular accident)  -     LIPID PANEL; Future  -     REFERRAL TO CARDIOLOGY  8. Dark stools  -     CBC WITH AUTOMATED DIFF; Future  -     AMB POC FECAL BLOOD, OCCULT, QL 3 CARDS  9. Essential hypertension  -     LIPID PANEL; Future  10. Medicare annual wellness visit, subsequent  -     REFERRAL TO [facility] CLINICAL SPECIALIST  11.  Excessive cerumen in both ear canals  -     carbamide peroxide (DEBROX) 6.5 % otic solution; Administer 5 Drops into each ear two (2) times a day., Normal, Disp-7.5 mL, R-0  -     REFERRAL TO ENT-OTOLARYNGOLOGY  12. Bilateral hearing loss, unspecified hearing loss type  -     carbamide peroxide (DEBROX) 6.5 % otic solution; Administer 5 Drops into each ear two (2) times a day., Normal, Disp-7.5 mL, R-0  -     REFERRAL TO ENT-OTOLARYNGOLOGY       Depression Risk Factor Screening     3 most recent PHQ Screens 5/26/2021   Little interest or pleasure in doing things Not at all   Feeling down, depressed, irritable, or hopeless Not at all   Total Score PHQ 2 0       Alcohol Risk Screen    Do you average more than 1 drink per night or more than 7 drinks a week:  No    On any one occasion in the past three months have you have had more than 3 drinks containing alcohol:  No        Functional Ability and Level of Safety    Hearing: The patient needs further evaluation. Activities of Daily Living: The home contains: handrails and grab bars  ADL Assessment 9/10/2019   Feeding yourself No Help Needed   Getting from bed to chair No Help Needed   Getting dressed No Help Needed   Bathing or showering No Help Needed   Walk across the room (includes cane/walker) Help Needed   Using the telphone No Help Needed   Taking your medications No Help Needed   Preparing meals No Help Needed   Managing money (expenses/bills) No Help Needed   Moderately strenuous housework (laundry) Help Needed   Shopping for personal items (toiletries/medicines) Help Needed   Shopping for groceries Help Needed   Driving Help Needed   Climbing a flight of stairs Help Needed   Getting to places beyond walking distances Help Needed       Ambulation: wheelchair bound and wears a prosthetic      Fall Risk:  Fall Risk Assessment, last 12 mths 2/26/2021   Able to walk? Yes   Fall in past 12 months? 0   Do you feel unsteady? 0   Are you worried about falling 0   Number of falls in past 12 months -   Fall with injury?  -      Abuse Screen:  Patient is not abused       Cognitive Screening    Has your family/caregiver stated any concerns about your memory: no     Health Maintenance Due     Health Maintenance Due   Topic Date Due    Hepatitis C Screening  Never done    DTaP/Tdap/Td series (1 - Tdap) Never done    Shingrix Vaccine Age 50> (1 of 2) Never done    Lipid Screen  01/28/2020    Foot Exam Q1  09/10/2020    MICROALBUMIN Q1  09/10/2020    Eye Exam Retinal or Dilated  09/13/2020       Patient Care Team   Patient Care Team:  Leno Alvarenga MD as PCP - General (Internal Medicine)  Leno Alvarenga MD as PCP - [facility]  Han Jameson MD (Surgery)  Shabana Lalnes MD (Nephrology)  Lyndsey Hooper MD as Physician (Cardiology)    History     Patient Active Problem List   Diagnosis Code    Essential hypertension I10    Controlled type 2 diabetes mellitus with circulatory disorder ([facility]) E11.59    Diabetic polyneuropathy associated with type 2 diabetes mellitus ([facility]) E11.42    Unilateral AKA, left ([facility]) W37.258L    CKD stage G3b/A2, GFR 30-44 and albumin creatinine ratio  mg/g ([facility]) N18.32    Diabetic nephropathy associated with type 2 diabetes mellitus ([facility]) E11.21    Age-related osteoporosis without current pathological fracture M81.0    Peripheral vascular disease due to secondary diabetes ([facility] E13.51    History of CVA (cerebrovascular accident) Z86.73     Past Medical History:   Diagnosis Date    Cerebrovascular accident (CVA) due to embolism of cerebral artery ([facility] 6/26/2020    Diabetes ([facility]     Hypertension     Peripheral vascular disease due to secondary diabetes ([facility] 1/28/2019      Past Surgical History:   Procedure Laterality Date    HX ABOVE KNEE AMPUTATION  02/06/2017    HX BELOW KNEE AMPUTATION      HX GYN      hysterectomy     Current Outpatient Medications   Medication Sig Dispense Refill    losartan (COZAAR) 25 mg tablet TAKE 1 TABLET BY MOUTH EVERY DAY      carbamide peroxide (DEBROX) 6.5 % otic solution Administer 5 Drops into each ear two (2) times a day. 7.5 mL 0    gabapentin (NEURONTIN) 100 mg capsule Take 1 Cap by mouth daily. Max Daily Amount: 100 mg. 90 Cap 1    mupirocin (BACTROBAN) 2 % ointment Apply  to affected area two (2) times a day. 22 g 0    amLODIPine (NORVASC) 5 mg tablet TAKE 1 TABLET EVERY DAY 90 Tab 0    Eliquis 2.5 mg tablet Take 1 Tab by mouth two (2) times a day. 180 Tab 0    atorvastatin (LIPITOR) 10 mg tablet Take 1 Tab by mouth daily. 90 Tab 3    carvediloL (COREG) 25 mg tablet       cloNIDine HCL (CATAPRES) 0.2 mg tablet two (2) times a day.       Vitamin D3 50 mcg (2,000 unit) tab TAKE 1 TABLET BY MOUTH EVERY DAY (Patient taking differently: Indications: unsure if they should continue this) 90 Tab 1    glucose blood VI test strips (TRUE METRIX GLUCOSE TEST STRIP) strip TEST BLOOD SUGAR ON AVERAGE 3 TIMES DAILY. 300 Strip 3    lancets (PRODIGY TWIST TOP LANCET) 28 gauge misc To measure blood sugar 3-4 times daily. Dx code E 11.51, Z79.4 200 Lancet 11    Blood-Glucose Meter (TRUE METRIX AIR GLUCOSE METER) monitoring kit Use to test blood sugar on average 3 times per day.  Dx code: E11.51 and Z79.4 1 Kit 0    gum/pva/dental adhesive (SUPER DENTURE ADHESIVE DT)        No Known Allergies    Family History   Problem Relation Age of Onset    Diabetes Mother     Diabetes Father     Heart Disease Father     Diabetes Brother     Diabetes Brother     Diabetes Brother      Social History     Tobacco Use    Smoking status: Former Smoker     Years: 15.00    Smokeless tobacco: Never Used   Substance Use Topics    Alcohol use: No         Eloise Peacock MD

## 2021-05-26 NOTE — PROGRESS NOTES
RM 1    Chief Complaint   Patient presents with    Follow-up     bp and diabetes        Visit Vitals  /75 (BP 1 Location: Left upper arm, BP Patient Position: Sitting, BP Cuff Size: Adult)   Pulse 60   Temp 97.8 °F (36.6 °C) (Oral)   Resp 18   Ht 5' 5.5\" (1.664 m)   Wt 123 lb (55.8 kg)   SpO2 98%   BMI 20.16 kg/m²       3 most recent PHQ Screens 5/26/2021   Little interest or pleasure in doing things Not at all   Feeling down, depressed, irritable, or hopeless Not at all   Total Score PHQ 2 0         1. Have you been to the ER, urgent care clinic since your last visit? Hospitalized since your last visit? No    2. Have you seen or consulted any other health care providers outside of the [address] [place] [address] since your last visit? Include any pap smears or colon screening. Dr. Samantha Bose - had blood work done -     Health Maintenance Due   Topic Date Due    Hepatitis C Screening  Never done    DTaP/Tdap/Td series (1 - Tdap) Never done    Shingrix Vaccine Age 50> (1 of 2) Never done    Lipid Screen  01/28/2020    Medicare Yearly Exam  09/10/2020    Foot Exam Q1  09/10/2020    MICROALBUMIN Q1  09/10/2020    Eye Exam Retinal or Dilated  09/13/2020       Learning Assessment 6/20/2017   PRIMARY LEARNER Patient   PRIMARY LANGUAGE ENGLISH   LEARNER PREFERENCE PRIMARY READING   ANSWERED BY patient   RELATIONSHIP SELF         AVS  education, follow up, and recommendations provided and addressed with patient.   services used to advise patient no '

## 2021-05-26 NOTE — PATIENT INSTRUCTIONS
1. For diabetes: stop glipizide. If fasting/before breakfast blood sugar is consistently higher than 130  Or if daytime blood sugars are consistently higher than 150. Please contact me    2. Ear drops for ear wax. If not imrpoving over next 2 weeks, follow-up with ENT. 3. Schedule with cardiology to evaluate right lower leg blood flow (referral entered). - if blisters continue to recur or there is further skin changes. Please contact clinic. 4. Schedule with podiatrist for foot care. 5.         Medicare Wellness Visit, Female     The best way to live healthy is to have a lifestyle where you eat a well-balanced diet, exercise regularly, limit alcohol use, and quit all forms of tobacco/nicotine, if applicable. Regular preventive services are another way to keep healthy. Preventive services (vaccines, screening tests, monitoring & exams) can help personalize your care plan, which helps you manage your own care. Screening tests can find health problems at the earliest stages, when they are easiest to treat. [facility] follows the current, evidence-based guidelines published by the [facility] Jayesh Niraj ([facility]) when recommending preventive services for our patients. Because we follow these guidelines, sometimes recommendations change over time as research supports it. (For example, mammograms used to be recommended annually. Even though Medicare will still pay for an annual mammogram, the newer guidelines recommend a mammogram every two years for women of average risk). Of course, you and your doctor may decide to screen more often for some diseases, based on your risk and your co-morbidities (chronic disease you are already diagnosed with).      Preventive services for you include:  - Medicare offers their members a free annual wellness visit, which is time for you and your primary care provider to discuss and plan for your preventive service needs. Take advantage of this benefit every year!  -All adults over the age of 72 should receive the recommended pneumonia vaccines. Current USPSTF guidelines recommend a series of two vaccines for the best pneumonia protection.   -All adults should have a flu vaccine yearly and a tetanus vaccine every 10 years.   -All adults age 48 and older should receive the shingles vaccines (series of two vaccines). -All adults age 38-68 who are overweight should have a diabetes screening test once every three years.   -All adults born between [birth year] and 1967 should be screened once for Hepatitis C.  -Other screening tests and preventive services for persons with diabetes include: an eye exam to screen for diabetic retinopathy, a kidney function test, a foot exam, and stricter control over your cholesterol.   -Cardiovascular screening for adults with routine risk involves an electrocardiogram (ECG) at intervals determined by your doctor.   -Colorectal cancer screenings should be done for adults age 54-65 with no increased risk factors for colorectal cancer. There are a number of acceptable methods of screening for this type of cancer. Each test has its own benefits and drawbacks. Discuss with your doctor what is most appropriate for you during your annual wellness visit. The different tests include: colonoscopy (considered the best screening method), a fecal occult blood test, a fecal DNA test, and sigmoidoscopy.    -A bone mass density test is recommended when a woman turns 65 to screen for osteoporosis. This test is only recommended one time, as a screening. Some providers will use this same test as a disease monitoring tool if you already have osteoporosis. -Breast cancer screenings are recommended every other year for women of normal risk, age 54-69.  -Cervical cancer screenings for women over age 72 are only recommended with certain risk factors.      Here is a list of your current Health Maintenance items (your personalized list of preventive services) with a due date:  Health Maintenance Due   Topic Date Due    Hepatitis C Test  Never done    DTaP/Tdap/Td  (1 - Tdap) Never done    Shingles Vaccine (1 of 2) Never done    Cholesterol Test   01/28/2020    Diabetic Foot Care  09/10/2020    Albumin Urine Test  09/10/2020    Eye Exam  09/13/2020            Diabetes Foot Health: Care Instructions  Your Care Instructions     When you have diabetes, your feet need extra care and attention. Diabetes can damage the nerve endings and blood vessels in your feet, making you less likely to notice when your feet are injured. Diabetes also limits your body's ability to fight infection and get blood to areas that need it. If you get a minor foot injury, it could become an ulcer or a serious infection. With good foot care, you can prevent most of these problems. Caring for your feet can be quick and easy. Most of the care can be done when you are bathing or getting ready for bed. Follow-up care is a key part of your treatment and safety. Be sure to make and go to all appointments, and call your doctor if you are having problems. It's also a good idea to know your test results and keep a list of the medicines you take. How can you care for yourself at home? · Keep your blood sugar close to normal by watching what and how much you eat, monitoring blood sugar, taking medicines if prescribed, and getting regular exercise. · Do not smoke. Smoking affects blood flow and can make foot problems worse. If you need help quitting, talk to your doctor about stop-smoking programs and medicines. These can increase your chances of quitting for good. · Eat a diet that is low in fats. High fat intake can cause fat to build up in your blood vessels and decrease blood flow. · Inspect your feet daily for blisters, cuts, cracks, or sores. If you cannot see well, use a mirror or have someone help you.   · Take care of your feet:  ? Wash your feet every day. Use warm (not hot) water. Check the water temperature with your wrists or other part of your body, not your feet. ? Dry your feet well. Pat them dry. Do not rub the skin on your feet too hard. Dry well between your toes. If the skin on your feet stays moist, bacteria or a fungus can grow, which can lead to infection. ? Keep your skin soft. Use moisturizing skin cream to keep the skin on your feet soft and prevent calluses and cracks. But do not put the cream between your toes, and stop using any cream that causes a rash. ? Clean underneath your toenails carefully. Do not use a sharp object to clean underneath your toenails. Use the blunt end of a nail file or other rounded tool. ? Trim and file your toenails straight across to prevent ingrown toenails. Use a nail clipper, not scissors. Use an emery board to smooth the edges. · Change socks daily. Socks without seams are best, because seams often rub the feet. You can find socks for people with diabetes from specialty catalogs. · Look inside your shoes every day for things like gravel or torn linings, which could cause blisters or sores. · Buy shoes that fit well:  ? Look for shoes that have plenty of space around the toes. This helps prevent bunions and blisters. ? Try on shoes while wearing the kind of socks you will usually wear with the shoes. ? Avoid plastic shoes. They may rub your feet and cause blisters. Good shoes should be made of materials that are flexible and breathable, such as leather or cloth. ? Break in new shoes slowly by wearing them for no more than an hour a day for several days. Take extra time to check your feet for red areas, blisters, or other problems after you wear new shoes. · Do not go barefoot. Do not wear sandals, and do not wear shoes with very thin soles. Thin soles are easy to puncture. They also do not protect your feet from hot pavement or cold weather. · Have your doctor check your feet during each visit.  If you have a foot problem, see your doctor. Do not try to treat an early foot problem at home. Home remedies or treatments that you can buy without a prescription (such as corn removers) can be harmful. · Always get early treatment for foot problems. A minor irritation can lead to a major problem if not properly cared for early. When should you call for help? Call your doctor now or seek immediate medical care if:    · You have a foot sore, an ulcer or break in the skin that is not healing after 4 days, bleeding corns or calluses, or an ingrown toenail.     · You have blue or black areas, which can mean bruising or blood flow problems.     · You have peeling skin or tiny blisters between your toes or cracking or oozing of the skin.     · You have a fever for more than 24 hours and a foot sore.     · You have new numbness or tingling in your feet that does not go away after you move your feet or change positions.     · You have unexplained or unusual swelling of the foot or ankle. Watch closely for changes in your health, and be sure to contact your doctor if:    · You cannot do proper foot care. Where can you learn more? Go to http://www.gray.com/  Enter A739 in the search box to learn more about \"Diabetes Foot Health: Care Instructions. \"  Current as of: August 31, 2020               Content Version: 12.8  © 3668-0112 [employer]. Care instructions adapted under license by [employer] (which disclaims liability or warranty for this information). If you have questions about a medical condition or this instruction, always ask your healthcare professional. [place] [address] any warranty or liability for your use of this information.

## 2021-05-27 ENCOUNTER — DOCUMENTATION ONLY (OUTPATIENT)
Dept: CASE MANAGEMENT | Age: 76
End: 2021-05-27

## 2021-05-27 LAB
BASOPHILS # BLD: 0 K/UL (ref 0–0.1)
BASOPHILS NFR BLD: 0 % (ref 0–1)
CHOLEST SERPL-MCNC: 61 MG/DL
DIFFERENTIAL METHOD BLD: ABNORMAL
EOSINOPHIL # BLD: 0.3 K/UL (ref 0–0.4)
EOSINOPHIL NFR BLD: 3 % (ref 0–7)
ERYTHROCYTE [DISTWIDTH] IN BLOOD BY AUTOMATED COUNT: 15.3 % (ref 11.5–14.5)
HCT VFR BLD AUTO: 33.6 % (ref 35–47)
HCV AB S/CO SERPL IA: <0.1 S/CO RATIO (ref 0–0.9)
HCV AB SERPL QL IA: NORMAL
HDLC SERPL-MCNC: 46 MG/DL
HDLC SERPL: 1.3 {RATIO} (ref 0–5)
HGB BLD-MCNC: 10.3 G/DL (ref 11.5–16)
IMM GRANULOCYTES # BLD AUTO: 0 K/UL (ref 0–0.04)
IMM GRANULOCYTES NFR BLD AUTO: 0 % (ref 0–0.5)
LDLC SERPL CALC-MCNC: NORMAL MG/DL (ref 0–100)
LYMPHOCYTES # BLD: 4 K/UL (ref 0.8–3.5)
LYMPHOCYTES NFR BLD: 37 % (ref 12–49)
MCH RBC QN AUTO: 25.4 PG (ref 26–34)
MCHC RBC AUTO-ENTMCNC: 30.7 G/DL (ref 30–36.5)
MCV RBC AUTO: 83 FL (ref 80–99)
MONOCYTES # BLD: 0.7 K/UL (ref 0–1)
MONOCYTES NFR BLD: 6 % (ref 5–13)
NEUTS SEG # BLD: 5.7 K/UL (ref 1.8–8)
NEUTS SEG NFR BLD: 54 % (ref 32–75)
NRBC # BLD: 0 K/UL (ref 0–0.01)
NRBC BLD-RTO: 0 PER 100 WBC
PLATELET # BLD AUTO: 210 K/UL (ref 150–400)
PMV BLD AUTO: 11.4 FL (ref 8.9–12.9)
RBC # BLD AUTO: 4.05 M/UL (ref 3.8–5.2)
TRIGL SERPL-MCNC: 90 MG/DL (ref ?–150)
VLDLC SERPL CALC-MCNC: NORMAL MG/DL
WBC # BLD AUTO: 10.7 K/UL (ref 3.6–11)

## 2021-05-27 NOTE — PROGRESS NOTES
Mild anemia, stable from last measure. Secondary to chronic kidney disease.    Cholesterol very well controlled  No sign of hepatitis C    Lab letter generated  Jenniffer Fabian MD

## 2021-05-27 NOTE — ACP (ADVANCE CARE PLANNING)
Advance Care Planning   Ambulatory ACP Specialist Patient Outreach    Date:  5/27/2021    ACP Specialist:  Precious Smith LCSW    Outreach call to patient in follow-up to ACP Specialist referral from:    [x] PCP  [] Provider   [] Ambulatory Care Management [] Other     For:                  [x] Continued Conversation for ACP decision making / Goals of Care             [] Code Status Discussion             [] Completion of Adv Directive             [] Completion of Portable DNR order             [] Other (Specify)    Date Referral Received:5/27/2021    Today's Outreach:  [x] First   [] Second  [] Third                                           Third outreach made by []  phone  [] email []   [facility] [facility]     Intervention:  [] Spoke with Patient   [x] Left VM requesting return call      Outcome:Left message on pt's voice mail. There is no email address provided in the chart. Requested a return call. Next Step:   [] ACP scheduled conversation  [x] Outreach again in one week               [] Email / Mail ACP Info Sheets  [] Email / Mail Advance Directive   []  Closing referral.  Routing closure to referring provider/staff and to ACP Specialist .      Thank you for this referral.  Precious Smith LCSW           \

## 2021-05-28 ENCOUNTER — TELEPHONE (OUTPATIENT)
Dept: INTERNAL MEDICINE CLINIC | Age: 76
End: 2021-05-28

## 2021-05-28 DIAGNOSIS — E11.51 CONTROLLED TYPE 2 DIABETES MELLITUS WITH DIABETIC PERIPHERAL ANGIOPATHY WITHOUT GANGRENE, WITH LONG-TERM CURRENT USE OF INSULIN (HCC): ICD-10-CM

## 2021-05-28 DIAGNOSIS — I10 ESSENTIAL HYPERTENSION: Primary | ICD-10-CM

## 2021-05-28 DIAGNOSIS — G62.9 NEUROPATHY: ICD-10-CM

## 2021-05-28 DIAGNOSIS — Z89.612 S/P AKA (ABOVE KNEE AMPUTATION) UNILATERAL, LEFT (HCC): Primary | ICD-10-CM

## 2021-05-28 DIAGNOSIS — Z79.4 CONTROLLED TYPE 2 DIABETES MELLITUS WITH DIABETIC PERIPHERAL ANGIOPATHY WITHOUT GANGRENE, WITH LONG-TERM CURRENT USE OF INSULIN (HCC): ICD-10-CM

## 2021-05-28 RX ORDER — CALCIUM CITRATE/VITAMIN D3 200MG-6.25
TABLET ORAL
Qty: 300 STRIP | Refills: 3 | Status: SHIPPED | OUTPATIENT
Start: 2021-05-28

## 2021-05-28 RX ORDER — GABAPENTIN 100 MG/1
CAPSULE ORAL
Qty: 90 CAPSULE | Refills: 1 | Status: SHIPPED | OUTPATIENT
Start: 2021-05-28 | End: 2021-08-05 | Stop reason: SDUPTHER

## 2021-05-28 RX ORDER — AMLODIPINE BESYLATE 5 MG/1
5 TABLET ORAL DAILY
Qty: 90 TABLET | Refills: 1 | Status: SHIPPED | OUTPATIENT
Start: 2021-05-28 | End: 2021-08-29

## 2021-05-28 RX ORDER — LANCETS 33 GAUGE
EACH MISCELLANEOUS
Qty: 3 PACKAGE | Refills: 0 | Status: SHIPPED | OUTPATIENT
Start: 2021-05-28 | End: 2021-11-01

## 2021-05-28 RX ORDER — INSULIN PUMP SYRINGE, 3 ML
EACH MISCELLANEOUS
Qty: 1 KIT | Refills: 0 | Status: SHIPPED | OUTPATIENT
Start: 2021-05-28 | End: 2022-04-14

## 2021-05-28 RX ORDER — ISOPROPYL ALCOHOL 70 ML/100ML
SWAB TOPICAL
Qty: 100 PAD | Refills: 0 | Status: SHIPPED | OUTPATIENT
Start: 2021-05-28 | End: 2021-10-14

## 2021-05-28 RX ORDER — CLONIDINE HYDROCHLORIDE 0.2 MG/1
0.2 TABLET ORAL 2 TIMES DAILY
Qty: 180 TABLET | Refills: 1 | Status: SHIPPED | OUTPATIENT
Start: 2021-05-28 | End: 2022-04-14

## 2021-05-28 NOTE — TELEPHONE ENCOUNTER
----- Message from Jessy Mcmanus sent at 5/28/2021  3:19 PM EDT -----  Regarding: Dr. Radha Egan Message/Vendor Calls    Caller's first and last name: Kimberly Child (Son)      Reason for call: Regarding Jonel Pettit is requesting 's referral for his mother to have her L prosthetic  leg to be refitted due to weight loss.        Call back required yes/no and why: Yes       Best contact number(s): [phone]      Details to clarify the request:      Jessy Mcmanus

## 2021-05-28 NOTE — TELEPHONE ENCOUNTER
[address] is requesting new prescriptions via fax. Please review.        Last visit 05/26/2021 MD Lukasz Crump   Next appointment 08/25/2021 MD Lukasz Crump   Previous refill encounter(s)   02/11/2020 True Metrix TS #300 with 3 refills     Requested Prescriptions     Pending Prescriptions Disp Refills    glucose blood VI test strips (True Metrix Glucose Test Strip) strip 300 Strip 3     Sig: TEST BLOOD SUGAR ON AVERAGE 3 TIMES DAILY.  alcohol swabs (BD Single Use Swabs Regular) padm 100 Pad 0     Sig: Use as directed.     lancets (TRUEplus Lancets) 33 gauge misc 3 Package 0     Sig: Use one lancet to test blood sugar three times a day

## 2021-06-01 NOTE — TELEPHONE ENCOUNTER
General amb supply order written for [facility]. Please provide patient with number and fax.      Marilou Landeros MD

## 2021-06-02 NOTE — TELEPHONE ENCOUNTER
Informed patients son on PHI. Order form has been faxed to [facility] location. They will call back if anything else is needed.

## 2021-06-09 ENCOUNTER — TELEPHONE (OUTPATIENT)
Dept: INTERNAL MEDICINE CLINIC | Age: 76
End: 2021-06-09

## 2021-06-09 ENCOUNTER — TRANSCRIBE ORDER (OUTPATIENT)
Dept: NEUROLOGY | Age: 76
End: 2021-06-09

## 2021-06-09 ENCOUNTER — HOME HEALTH ADMISSION (OUTPATIENT)
Dept: HOME HEALTH SERVICES | Facility: HOME HEALTH | Age: 76
End: 2021-06-09
Payer: MEDICARE

## 2021-06-09 DIAGNOSIS — S78.112A UNILATERAL AKA, LEFT (HCC): Primary | ICD-10-CM

## 2021-06-09 DIAGNOSIS — M24.552 CONTRACTURE OF LEFT HIP: ICD-10-CM

## 2021-06-09 NOTE — TELEPHONE ENCOUNTER
----- Message from Aj Jules sent at 6/9/2021  9:11 AM EDT -----  Regarding: JOSSELYN/TELEPHONE  Contact: [phone]  General Message/Vendor Calls    Caller's first and last name: Sebas James (son)      Reason for call: Referral for in home physical therapy      Callback required yes/no and why: Yes      Best contact number(s): [phone]      Details to clarify the request: Per son per [facility] patient needs a referral for in home physical therapy for strengthening, endurance and contracture.       Aj Jules

## 2021-06-10 ENCOUNTER — DOCUMENTATION ONLY (OUTPATIENT)
Dept: CASE MANAGEMENT | Age: 76
End: 2021-06-10

## 2021-06-10 NOTE — ACP (ADVANCE CARE PLANNING)
Advance Care Planning   Ambulatory ACP Specialist Patient Outreach    Date:  6/10/2021    ACP Specialist:  Ericka Bailon LCSW    Outreach call to patient in follow-up to ACP Specialist referral from:    [x] PCP  [] Provider   [] Ambulatory Care Management [] Other     For:                  [x] Continued Conversation for ACP decision making / Goals of Care             [] Code Status Discussion             [] Completion of Adv Directive             [] Completion of Portable DNR order             [] Other (Specify)    Date Referral Received:5/27/2021    Today's Outreach:  [] First   [x] Second  [] Third                                           Third outreach made by []  phone  [] email []   [employer]     Intervention:  [] Spoke with Patient   [x] Left VM requesting return call      Outcome:with no email provided, and the unsuccessful attempts to reach pt., an ACP packet mailed to pt for review. Next Step:   [] ACP scheduled conversation  [] Outreach again in one week               [x] Email / Mail ACP Info Sheets  [] Email / Mail Advance Directive   []  Closing referral.  Routing closure to referring provider/staff and to ACP Specialist .      Thank you for this referral.  Ericka Bailon LCSW

## 2021-06-11 ENCOUNTER — HOME CARE VISIT (OUTPATIENT)
Dept: SCHEDULING | Facility: HOME HEALTH | Age: 76
End: 2021-06-11
Payer: MEDICARE

## 2021-06-11 VITALS
DIASTOLIC BLOOD PRESSURE: 64 MMHG | RESPIRATION RATE: 16 BRPM | OXYGEN SATURATION: 97 % | SYSTOLIC BLOOD PRESSURE: 128 MMHG | HEART RATE: 78 BPM | TEMPERATURE: 97.7 F

## 2021-06-11 PROCEDURE — G0151 HHCP-SERV OF PT,EA 15 MIN: HCPCS

## 2021-06-11 PROCEDURE — 3331090002 HH PPS REVENUE DEBIT

## 2021-06-11 PROCEDURE — 3331090001 HH PPS REVENUE CREDIT

## 2021-06-11 PROCEDURE — 400013 HH SOC

## 2021-06-11 PROCEDURE — 400018 HH-NO PAY CLAIM PROCEDURE

## 2021-06-12 PROCEDURE — 3331090002 HH PPS REVENUE DEBIT

## 2021-06-12 PROCEDURE — 3331090001 HH PPS REVENUE CREDIT

## 2021-06-13 PROCEDURE — 3331090002 HH PPS REVENUE DEBIT

## 2021-06-13 PROCEDURE — 3331090001 HH PPS REVENUE CREDIT

## 2021-06-14 PROCEDURE — 3331090002 HH PPS REVENUE DEBIT

## 2021-06-14 PROCEDURE — 3331090001 HH PPS REVENUE CREDIT

## 2021-06-15 ENCOUNTER — HOME CARE VISIT (OUTPATIENT)
Dept: SCHEDULING | Facility: HOME HEALTH | Age: 76
End: 2021-06-15
Payer: MEDICARE

## 2021-06-15 PROCEDURE — 3331090002 HH PPS REVENUE DEBIT

## 2021-06-15 PROCEDURE — 3331090001 HH PPS REVENUE CREDIT

## 2021-06-15 PROCEDURE — G0157 HHC PT ASSISTANT EA 15: HCPCS

## 2021-06-16 PROCEDURE — 3331090001 HH PPS REVENUE CREDIT

## 2021-06-16 PROCEDURE — 3331090002 HH PPS REVENUE DEBIT

## 2021-06-17 PROCEDURE — 3331090001 HH PPS REVENUE CREDIT

## 2021-06-17 PROCEDURE — 3331090002 HH PPS REVENUE DEBIT

## 2021-06-18 ENCOUNTER — HOME CARE VISIT (OUTPATIENT)
Dept: SCHEDULING | Facility: HOME HEALTH | Age: 76
End: 2021-06-18
Payer: MEDICARE

## 2021-06-18 VITALS
TEMPERATURE: 98 F | SYSTOLIC BLOOD PRESSURE: 152 MMHG | OXYGEN SATURATION: 97 % | RESPIRATION RATE: 16 BRPM | HEART RATE: 76 BPM | DIASTOLIC BLOOD PRESSURE: 68 MMHG

## 2021-06-18 PROCEDURE — 3331090001 HH PPS REVENUE CREDIT

## 2021-06-18 PROCEDURE — G0157 HHC PT ASSISTANT EA 15: HCPCS

## 2021-06-18 PROCEDURE — 3331090002 HH PPS REVENUE DEBIT

## 2021-06-19 PROCEDURE — 3331090001 HH PPS REVENUE CREDIT

## 2021-06-19 PROCEDURE — 3331090002 HH PPS REVENUE DEBIT

## 2021-06-20 PROCEDURE — 3331090002 HH PPS REVENUE DEBIT

## 2021-06-20 PROCEDURE — 3331090001 HH PPS REVENUE CREDIT

## 2021-06-21 ENCOUNTER — HOME CARE VISIT (OUTPATIENT)
Dept: SCHEDULING | Facility: HOME HEALTH | Age: 76
End: 2021-06-21
Payer: MEDICARE

## 2021-06-21 VITALS
OXYGEN SATURATION: 97 % | DIASTOLIC BLOOD PRESSURE: 66 MMHG | SYSTOLIC BLOOD PRESSURE: 148 MMHG | HEART RATE: 76 BPM | TEMPERATURE: 98.4 F | RESPIRATION RATE: 16 BRPM

## 2021-06-21 PROCEDURE — G0157 HHC PT ASSISTANT EA 15: HCPCS

## 2021-06-21 PROCEDURE — 3331090001 HH PPS REVENUE CREDIT

## 2021-06-21 PROCEDURE — 3331090002 HH PPS REVENUE DEBIT

## 2021-06-22 PROCEDURE — 3331090002 HH PPS REVENUE DEBIT

## 2021-06-22 PROCEDURE — 3331090001 HH PPS REVENUE CREDIT

## 2021-06-23 VITALS
RESPIRATION RATE: 16 BRPM | HEART RATE: 74 BPM | OXYGEN SATURATION: 98 % | DIASTOLIC BLOOD PRESSURE: 68 MMHG | TEMPERATURE: 98.3 F | SYSTOLIC BLOOD PRESSURE: 146 MMHG

## 2021-06-23 PROCEDURE — 3331090001 HH PPS REVENUE CREDIT

## 2021-06-23 PROCEDURE — 3331090002 HH PPS REVENUE DEBIT

## 2021-06-24 PROCEDURE — 3331090002 HH PPS REVENUE DEBIT

## 2021-06-24 PROCEDURE — 3331090001 HH PPS REVENUE CREDIT

## 2021-06-25 ENCOUNTER — HOME CARE VISIT (OUTPATIENT)
Dept: SCHEDULING | Facility: HOME HEALTH | Age: 76
End: 2021-06-25
Payer: MEDICARE

## 2021-06-25 PROCEDURE — 3331090001 HH PPS REVENUE CREDIT

## 2021-06-25 PROCEDURE — G0157 HHC PT ASSISTANT EA 15: HCPCS

## 2021-06-25 PROCEDURE — 3331090002 HH PPS REVENUE DEBIT

## 2021-06-26 PROCEDURE — 3331090001 HH PPS REVENUE CREDIT

## 2021-06-26 PROCEDURE — 3331090002 HH PPS REVENUE DEBIT

## 2021-06-27 VITALS
DIASTOLIC BLOOD PRESSURE: 82 MMHG | RESPIRATION RATE: 16 BRPM | HEART RATE: 74 BPM | SYSTOLIC BLOOD PRESSURE: 149 MMHG | OXYGEN SATURATION: 97 % | TEMPERATURE: 98.2 F

## 2021-06-27 PROCEDURE — 3331090001 HH PPS REVENUE CREDIT

## 2021-06-27 PROCEDURE — 3331090002 HH PPS REVENUE DEBIT

## 2021-06-28 ENCOUNTER — HOME CARE VISIT (OUTPATIENT)
Dept: SCHEDULING | Facility: HOME HEALTH | Age: 76
End: 2021-06-28
Payer: MEDICARE

## 2021-06-28 PROCEDURE — 3331090001 HH PPS REVENUE CREDIT

## 2021-06-28 PROCEDURE — G0157 HHC PT ASSISTANT EA 15: HCPCS

## 2021-06-28 PROCEDURE — 3331090002 HH PPS REVENUE DEBIT

## 2021-06-29 PROCEDURE — 3331090001 HH PPS REVENUE CREDIT

## 2021-06-29 PROCEDURE — 3331090002 HH PPS REVENUE DEBIT

## 2021-06-30 PROCEDURE — 3331090002 HH PPS REVENUE DEBIT

## 2021-06-30 PROCEDURE — 3331090001 HH PPS REVENUE CREDIT

## 2021-07-01 ENCOUNTER — HOME CARE VISIT (OUTPATIENT)
Dept: SCHEDULING | Facility: HOME HEALTH | Age: 76
End: 2021-07-01
Payer: MEDICARE

## 2021-07-01 VITALS
RESPIRATION RATE: 16 BRPM | HEART RATE: 74 BPM | DIASTOLIC BLOOD PRESSURE: 68 MMHG | OXYGEN SATURATION: 97 % | SYSTOLIC BLOOD PRESSURE: 142 MMHG | TEMPERATURE: 98.6 F

## 2021-07-01 PROCEDURE — 3331090002 HH PPS REVENUE DEBIT

## 2021-07-01 PROCEDURE — 3331090001 HH PPS REVENUE CREDIT

## 2021-07-01 PROCEDURE — G0157 HHC PT ASSISTANT EA 15: HCPCS

## 2021-07-02 PROCEDURE — 3331090002 HH PPS REVENUE DEBIT

## 2021-07-02 PROCEDURE — 3331090001 HH PPS REVENUE CREDIT

## 2021-07-03 PROCEDURE — 3331090002 HH PPS REVENUE DEBIT

## 2021-07-03 PROCEDURE — 3331090001 HH PPS REVENUE CREDIT

## 2021-07-04 PROCEDURE — 3331090002 HH PPS REVENUE DEBIT

## 2021-07-04 PROCEDURE — 3331090001 HH PPS REVENUE CREDIT

## 2021-07-05 PROCEDURE — 3331090001 HH PPS REVENUE CREDIT

## 2021-07-05 PROCEDURE — 3331090002 HH PPS REVENUE DEBIT

## 2021-07-06 VITALS
RESPIRATION RATE: 16 BRPM | OXYGEN SATURATION: 98 % | DIASTOLIC BLOOD PRESSURE: 72 MMHG | HEART RATE: 76 BPM | TEMPERATURE: 98.4 F | SYSTOLIC BLOOD PRESSURE: 136 MMHG

## 2021-07-06 PROCEDURE — 3331090001 HH PPS REVENUE CREDIT

## 2021-07-06 PROCEDURE — 3331090002 HH PPS REVENUE DEBIT

## 2021-07-07 ENCOUNTER — HOME CARE VISIT (OUTPATIENT)
Dept: SCHEDULING | Facility: HOME HEALTH | Age: 76
End: 2021-07-07
Payer: MEDICARE

## 2021-07-07 PROCEDURE — 3331090002 HH PPS REVENUE DEBIT

## 2021-07-07 PROCEDURE — 3331090001 HH PPS REVENUE CREDIT

## 2021-07-07 PROCEDURE — G0157 HHC PT ASSISTANT EA 15: HCPCS

## 2021-07-08 ENCOUNTER — HOME CARE VISIT (OUTPATIENT)
Dept: SCHEDULING | Facility: HOME HEALTH | Age: 76
End: 2021-07-08
Payer: MEDICARE

## 2021-07-08 VITALS
DIASTOLIC BLOOD PRESSURE: 62 MMHG | TEMPERATURE: 98.1 F | OXYGEN SATURATION: 99 % | SYSTOLIC BLOOD PRESSURE: 159 MMHG | HEART RATE: 63 BPM | RESPIRATION RATE: 16 BRPM

## 2021-07-08 VITALS
HEART RATE: 72 BPM | RESPIRATION RATE: 16 BRPM | TEMPERATURE: 97.8 F | SYSTOLIC BLOOD PRESSURE: 122 MMHG | OXYGEN SATURATION: 96 % | DIASTOLIC BLOOD PRESSURE: 70 MMHG

## 2021-07-08 PROCEDURE — 3331090001 HH PPS REVENUE CREDIT

## 2021-07-08 PROCEDURE — G0151 HHCP-SERV OF PT,EA 15 MIN: HCPCS

## 2021-07-08 PROCEDURE — 3331090002 HH PPS REVENUE DEBIT

## 2021-07-09 PROCEDURE — 3331090002 HH PPS REVENUE DEBIT

## 2021-07-09 PROCEDURE — 3331090001 HH PPS REVENUE CREDIT

## 2021-07-10 PROCEDURE — 3331090001 HH PPS REVENUE CREDIT

## 2021-07-10 PROCEDURE — 3331090002 HH PPS REVENUE DEBIT

## 2021-07-11 PROCEDURE — 400018 HH-NO PAY CLAIM PROCEDURE

## 2021-07-11 PROCEDURE — 3331090001 HH PPS REVENUE CREDIT

## 2021-07-11 PROCEDURE — 3331090002 HH PPS REVENUE DEBIT

## 2021-07-12 PROCEDURE — 3331090001 HH PPS REVENUE CREDIT

## 2021-07-12 PROCEDURE — 3331090002 HH PPS REVENUE DEBIT

## 2021-07-13 ENCOUNTER — HOME CARE VISIT (OUTPATIENT)
Dept: SCHEDULING | Facility: HOME HEALTH | Age: 76
End: 2021-07-13
Payer: MEDICARE

## 2021-07-13 PROCEDURE — 3331090001 HH PPS REVENUE CREDIT

## 2021-07-13 PROCEDURE — G0151 HHCP-SERV OF PT,EA 15 MIN: HCPCS

## 2021-07-13 PROCEDURE — 400013 HH SOC

## 2021-07-13 PROCEDURE — 3331090002 HH PPS REVENUE DEBIT

## 2021-07-14 VITALS
OXYGEN SATURATION: 97 % | HEART RATE: 78 BPM | SYSTOLIC BLOOD PRESSURE: 122 MMHG | TEMPERATURE: 97.8 F | RESPIRATION RATE: 16 BRPM | DIASTOLIC BLOOD PRESSURE: 60 MMHG

## 2021-07-14 PROCEDURE — 3331090001 HH PPS REVENUE CREDIT

## 2021-07-14 PROCEDURE — 3331090002 HH PPS REVENUE DEBIT

## 2021-07-15 PROCEDURE — 3331090002 HH PPS REVENUE DEBIT

## 2021-07-15 PROCEDURE — 3331090001 HH PPS REVENUE CREDIT

## 2021-07-16 ENCOUNTER — HOME CARE VISIT (OUTPATIENT)
Dept: SCHEDULING | Facility: HOME HEALTH | Age: 76
End: 2021-07-16
Payer: MEDICARE

## 2021-07-16 PROCEDURE — G0151 HHCP-SERV OF PT,EA 15 MIN: HCPCS

## 2021-07-16 PROCEDURE — 3331090001 HH PPS REVENUE CREDIT

## 2021-07-16 PROCEDURE — 3331090002 HH PPS REVENUE DEBIT

## 2021-07-17 PROCEDURE — 3331090001 HH PPS REVENUE CREDIT

## 2021-07-17 PROCEDURE — 3331090002 HH PPS REVENUE DEBIT

## 2021-07-18 PROCEDURE — 3331090001 HH PPS REVENUE CREDIT

## 2021-07-18 PROCEDURE — 3331090002 HH PPS REVENUE DEBIT

## 2021-07-19 PROCEDURE — 3331090001 HH PPS REVENUE CREDIT

## 2021-07-19 PROCEDURE — 3331090002 HH PPS REVENUE DEBIT

## 2021-07-20 ENCOUNTER — HOME CARE VISIT (OUTPATIENT)
Dept: SCHEDULING | Facility: HOME HEALTH | Age: 76
End: 2021-07-20
Payer: MEDICARE

## 2021-07-20 PROCEDURE — G0157 HHC PT ASSISTANT EA 15: HCPCS

## 2021-07-20 PROCEDURE — 3331090001 HH PPS REVENUE CREDIT

## 2021-07-20 PROCEDURE — 3331090002 HH PPS REVENUE DEBIT

## 2021-07-21 ENCOUNTER — TELEPHONE (OUTPATIENT)
Dept: INTERNAL MEDICINE CLINIC | Age: 76
End: 2021-07-21

## 2021-07-21 PROCEDURE — 3331090002 HH PPS REVENUE DEBIT

## 2021-07-21 PROCEDURE — 3331090001 HH PPS REVENUE CREDIT

## 2021-07-21 NOTE — TELEPHONE ENCOUNTER
----- Message from Carlos Lyman sent at 7/21/2021 10:51 AM EDT -----  Regarding: Dr Yadi Yates first and last name:Maggi with [facility]       Reason for call:checking on two orders she faxed over for pt to have PT on 7/13 and 7/19 and so far she has not received a response back yet and wanted to know if Dr Marti Brady  has been in the office lately  ,they  like to get pt started on physical therapy as soon as possible       Callback required yes/no and why:yes for reason given above       Best contact number(s):[phone] (f) [phone]      Details to clarify the request:      Carlos Lyman

## 2021-07-21 NOTE — TELEPHONE ENCOUNTER
Order signed via e-signature. Called and advised office. Ticket placed to connect care team to see if they can make these orders more visible in our chart.       Brian Landin MD

## 2021-07-22 ENCOUNTER — HOME CARE VISIT (OUTPATIENT)
Dept: HOME HEALTH SERVICES | Facility: HOME HEALTH | Age: 76
End: 2021-07-22
Payer: MEDICARE

## 2021-07-22 VITALS
RESPIRATION RATE: 16 BRPM | OXYGEN SATURATION: 97 % | SYSTOLIC BLOOD PRESSURE: 142 MMHG | DIASTOLIC BLOOD PRESSURE: 74 MMHG | HEART RATE: 76 BPM | TEMPERATURE: 97.7 F

## 2021-07-22 PROCEDURE — 3331090002 HH PPS REVENUE DEBIT

## 2021-07-22 PROCEDURE — 3331090001 HH PPS REVENUE CREDIT

## 2021-07-23 PROCEDURE — 3331090001 HH PPS REVENUE CREDIT

## 2021-07-23 PROCEDURE — 3331090002 HH PPS REVENUE DEBIT

## 2021-07-24 PROCEDURE — 3331090002 HH PPS REVENUE DEBIT

## 2021-07-24 PROCEDURE — 3331090001 HH PPS REVENUE CREDIT

## 2021-07-25 PROCEDURE — 3331090002 HH PPS REVENUE DEBIT

## 2021-07-25 PROCEDURE — 3331090001 HH PPS REVENUE CREDIT

## 2021-07-26 PROCEDURE — 3331090002 HH PPS REVENUE DEBIT

## 2021-07-26 PROCEDURE — 3331090001 HH PPS REVENUE CREDIT

## 2021-07-27 PROCEDURE — 3331090002 HH PPS REVENUE DEBIT

## 2021-07-27 PROCEDURE — 3331090001 HH PPS REVENUE CREDIT

## 2021-07-28 PROCEDURE — 3331090001 HH PPS REVENUE CREDIT

## 2021-07-28 PROCEDURE — 3331090002 HH PPS REVENUE DEBIT

## 2021-07-29 PROCEDURE — 3331090001 HH PPS REVENUE CREDIT

## 2021-07-29 PROCEDURE — 3331090002 HH PPS REVENUE DEBIT

## 2021-07-30 PROCEDURE — 3331090001 HH PPS REVENUE CREDIT

## 2021-07-30 PROCEDURE — 3331090002 HH PPS REVENUE DEBIT

## 2021-07-31 PROCEDURE — 3331090002 HH PPS REVENUE DEBIT

## 2021-07-31 PROCEDURE — 3331090001 HH PPS REVENUE CREDIT

## 2021-08-01 PROCEDURE — 3331090002 HH PPS REVENUE DEBIT

## 2021-08-01 PROCEDURE — 3331090001 HH PPS REVENUE CREDIT

## 2021-08-02 ENCOUNTER — DOCUMENTATION ONLY (OUTPATIENT)
Dept: INTERNAL MEDICINE CLINIC | Age: 76
End: 2021-08-02

## 2021-08-02 PROCEDURE — 3331090002 HH PPS REVENUE DEBIT

## 2021-08-02 PROCEDURE — 3331090001 HH PPS REVENUE CREDIT

## 2021-08-02 NOTE — PROGRESS NOTES
[facility], Order ID number [id number], Provider signed 7/29/2021, Fax confirmed 7/30/2021 to [phone], Confirmations scanned into [facility]

## 2021-08-02 NOTE — PROGRESS NOTES
Request received to resume home health orders for PT, OT and speech. Scheduled for hospital follow-up tomorrow with Dr. Ruiz Sol.        Joya De La Torre MD

## 2021-08-03 ENCOUNTER — OFFICE VISIT (OUTPATIENT)
Dept: INTERNAL MEDICINE CLINIC | Age: 76
End: 2021-08-03
Payer: MEDICARE

## 2021-08-03 ENCOUNTER — HOME CARE VISIT (OUTPATIENT)
Dept: HOME HEALTH SERVICES | Facility: HOME HEALTH | Age: 76
End: 2021-08-03
Payer: MEDICARE

## 2021-08-03 ENCOUNTER — TELEPHONE (OUTPATIENT)
Dept: INTERNAL MEDICINE CLINIC | Age: 76
End: 2021-08-03

## 2021-08-03 ENCOUNTER — TELEPHONE (OUTPATIENT)
Dept: NEUROLOGY | Age: 76
End: 2021-08-03

## 2021-08-03 VITALS
DIASTOLIC BLOOD PRESSURE: 58 MMHG | RESPIRATION RATE: 15 BRPM | HEIGHT: 66 IN | SYSTOLIC BLOOD PRESSURE: 145 MMHG | BODY MASS INDEX: 19.93 KG/M2 | TEMPERATURE: 97.9 F | OXYGEN SATURATION: 100 % | HEART RATE: 57 BPM | WEIGHT: 124 LBS

## 2021-08-03 DIAGNOSIS — N18.32 CKD STAGE G3B/A2, GFR 30-44 AND ALBUMIN CREATININE RATIO 30-299 MG/G (HCC): ICD-10-CM

## 2021-08-03 DIAGNOSIS — I69.30 H/O: STROKE WITH RESIDUAL EFFECTS: ICD-10-CM

## 2021-08-03 DIAGNOSIS — N18.32 CKD STAGE G3B/A2, GFR 30-44 AND ALBUMIN CREATININE RATIO 30-299 MG/G (HCC): Primary | ICD-10-CM

## 2021-08-03 DIAGNOSIS — E11.51 CONTROLLED TYPE 2 DIABETES MELLITUS WITH DIABETIC PERIPHERAL ANGIOPATHY WITHOUT GANGRENE, WITH LONG-TERM CURRENT USE OF INSULIN (HCC): ICD-10-CM

## 2021-08-03 DIAGNOSIS — I63.9 CEREBROVASCULAR ACCIDENT (CVA), UNSPECIFIED MECHANISM (HCC): Primary | ICD-10-CM

## 2021-08-03 DIAGNOSIS — Z09 HOSPITAL DISCHARGE FOLLOW-UP: ICD-10-CM

## 2021-08-03 DIAGNOSIS — S78.112A UNILATERAL AKA, LEFT (HCC): ICD-10-CM

## 2021-08-03 DIAGNOSIS — Z79.4 CONTROLLED TYPE 2 DIABETES MELLITUS WITH DIABETIC PERIPHERAL ANGIOPATHY WITHOUT GANGRENE, WITH LONG-TERM CURRENT USE OF INSULIN (HCC): ICD-10-CM

## 2021-08-03 PROCEDURE — G8536 NO DOC ELDER MAL SCRN: HCPCS | Performed by: INTERNAL MEDICINE

## 2021-08-03 PROCEDURE — G8427 DOCREV CUR MEDS BY ELIG CLIN: HCPCS | Performed by: INTERNAL MEDICINE

## 2021-08-03 PROCEDURE — G8420 CALC BMI NORM PARAMETERS: HCPCS | Performed by: INTERNAL MEDICINE

## 2021-08-03 PROCEDURE — 99214 OFFICE O/P EST MOD 30 MIN: CPT | Performed by: INTERNAL MEDICINE

## 2021-08-03 PROCEDURE — G8510 SCR DEP NEG, NO PLAN REQD: HCPCS | Performed by: INTERNAL MEDICINE

## 2021-08-03 PROCEDURE — 3331090002 HH PPS REVENUE DEBIT

## 2021-08-03 PROCEDURE — 3331090001 HH PPS REVENUE CREDIT

## 2021-08-03 PROCEDURE — 1101F PT FALLS ASSESS-DOCD LE1/YR: CPT | Performed by: INTERNAL MEDICINE

## 2021-08-03 PROCEDURE — 1090F PRES/ABSN URINE INCON ASSESS: CPT | Performed by: INTERNAL MEDICINE

## 2021-08-03 PROCEDURE — G8754 DIAS BP LESS 90: HCPCS | Performed by: INTERNAL MEDICINE

## 2021-08-03 PROCEDURE — G8753 SYS BP > OR = 140: HCPCS | Performed by: INTERNAL MEDICINE

## 2021-08-03 RX ORDER — ASPIRIN 81 MG
TABLET, DELAYED RELEASE (ENTERIC COATED) ORAL
COMMUNITY
Start: 2021-07-23

## 2021-08-03 NOTE — PROGRESS NOTES
Jammie Lew (: 1945) is a 64 y.o. female, established patient, here for evaluation of the following chief complaint(s):  Chief Complaint   Patient presents with    Transitions Of Care     Patient discharged from [facility] on 21, admitted for stroke. Assessment and Plan:       ICD-10-CM ICD-9-CM    1. Cerebrovascular accident (CVA), unspecified mechanism ([facility] [address]  I63.9 434.91 REFERRAL TO NEUROLOGY   2. Unilateral AKA, left ([facility] [address]  S78.112A 897.2    3. Controlled type 2 diabetes mellitus with diabetic peripheral angiopathy without gangrene, with long-term current use of insulin ([facility])  E11.51 250.70     Z79.4 443.81      V58.67    4. CKD stage G3b/A2, GFR 30-44 and albumin creatinine ratio  mg/g ([facility])  N18.32 585.3    5. Hospital discharge follow-up  Z09 V67.59 REFERRAL TO NEUROLOGY       1. Follow-up with neurology reviewed. PT/OT orders placed by Dr. Nile Aguero at visit as reviewed. 3,4:  Labs updated with renal--Dr. Nile Aguero will follow-up labs as needed with next visit as scheduled below. Follow-up and Dispositions    · Return for as scheduled. lab results and schedule of future lab studies reviewed with patient  reviewed medications and side effects in detail    For additional documentation of information and/or recommendations discussed this visit, please see notes in instructions. Plan and evaluation (above) reviewed with pt/son at visit  Patient/son voiced understanding of plan and provided with time to ask/review questions. After Visit Summary (AVS) provided to pt/son after visit with additional instructions as needed/reviewed. Future Appointments   Date Time Provider Inge Aburto   2021  9:30 AM Silvano Mckeon MD [facility] AMB     --Updated future visits after patient check-out.       History of Present Illness:     Notes (nursing/rooming note copied below in italics):  Dr. Diana Dallas (kidney specialist) appt on the 21    [facility] discharge follow-up. PCP:  Silvano Mckeon MD    [facility] discharge summary reviewed by PCP yesterday. She was admitted 7/21 and discharged 7/23 for suspected acute CVA and h/o CVA. Stage 3b CKD. She had left hand tingling and slurred speech with initial CT negative. MRI showed multiple small acute infarcts in right frontal lobe. She was on statin and low-dose Eliquis. Neuro eval recommended low-dose ASA in addition to Eliquis. Son notes he purchased ASA, but covered with insurance also. He has a script for 10mg amlodipine from pharmacy as well. She returned to baseline prior to discharge. Amlodipine was increased from 5mg daily to 10mg daily during admit also. She had AWV with Dr. Nile Aguero 5-26-21. She still has left hand numbness from the stroke. She has had labs done last week for nephrology visit above. Nursing screenings reviewed by provider at visit. Vital signs, medical history (including PMH, FH, SH, History and Problem Lists), current medicines, allergies reviewed during visit. Prior to Admission medications    Medication Sig Start Date End Date Taking? Authorizing Provider   Aspirin Low Dose 81 mg tablet TAKE 1 TABLET BY MOUTH DAILY 7/23/21  Yes Provider, Historical   sodium bicarbonate 650 mg tablet Take 650 mg by mouth three (3) times daily. Yes Rosa Reyes MD   amLODIPine (NORVASC) 5 mg tablet Take 1 Tablet by mouth daily. Patient taking differently: Take 10 mg by mouth daily. 5/28/21  Yes Silvano Mckeon MD   cloNIDine HCL (CATAPRES) 0.2 mg tablet Take 1 Tablet by mouth two (2) times a day. 5/28/21  Yes Silvano Mckeon MD   Blood-Glucose Meter (True Metrix Air Glucose Meter) monitoring kit Use to test blood sugar on average 3 times per day. Dx code: E11.51 and Z79.4 5/28/21  Yes Silvano Mckeon MD   glucose blood VI test strips (True Metrix Glucose Test Strip) strip TEST BLOOD SUGAR ON AVERAGE 3 TIMES DAILY.  5/28/21  Yes Nile Aguero Geovanni Montano MD   alcohol swabs (BD Single Use Swabs Regular) padm Use as directed. 5/28/21  Yes Jorge Cruz MD   lancets (TRUEplus Lancets) 33 gauge misc Use one lancet to test blood sugar three times a day 5/28/21  Yes Jorge Cruz MD   gabapentin (NEURONTIN) 100 mg capsule TAKE 1 CAPSULE BY MOUTH DAILY. MAX DAILY AMOUNT: 100 MG. 5/28/21  Yes Jorge Cruz MD   losartan (COZAAR) 25 mg tablet TAKE 1 TABLET BY MOUTH EVERY DAY 5/10/21  Yes Provider, Historical   carbamide peroxide (DEBROX) 6.5 % otic solution Administer 5 Drops into each ear two (2) times a day. 5/26/21  Yes Jorge Cruz MD   mupirocin Oakleyruthie Martinez) 2 % ointment Apply  to affected area two (2) times a day. 5/26/21  Yes Jorge Cruz MD   Eliquis 2.5 mg tablet Take 1 Tab by mouth two (2) times a day. 8/7/20  Yes Jorge Cruz MD   atorvastatin (LIPITOR) 10 mg tablet Take 1 Tab by mouth daily. 8/2/20  Yes Jorge Cruz MD   carvediloL (COREG) 25 mg tablet Take 25 mg by mouth two (2) times a day. 6/26/20  Yes Provider, Historical   Vitamin D3 50 mcg (2,000 unit) tab TAKE 1 TABLET BY MOUTH EVERY DAY  Patient taking differently: Indications: unsure if they should continue this 4/22/20  Yes Jorge Cruz MD   lancets (PRODIGY TWIST TOP LANCET) 28 gauge misc To measure blood sugar 3-4 times daily. Dx code E 11.51, Z79.4 10/17/18  Yes Jorge Cruz MD   gum/pva/dental adhesive (SUPER DENTURE ADHESIVE DT)  7/11/18  Yes Provider, Historical        ROS    Vitals:    08/03/21 0939   BP: (!) 145/58   Pulse: (!) 57   Resp: 15   Temp: 97.9 °F (36.6 °C)   TempSrc: Oral   SpO2: 100%   Weight: 124 lb (56.2 kg)   Height: 5' 5.5\" (1.664 m)   PainSc:   0 - No pain      Body mass index is 20.32 kg/m². Physical Exam:     Physical Exam  Vitals and nursing note reviewed. Constitutional:       General: She is not in acute distress. Appearance: Normal appearance. She is well-developed.  She is not diaphoretic. HENT:      Head: Normocephalic and atraumatic. Mouth/Throat:      Mouth: Mucous membranes are moist.   Eyes:      General: No scleral icterus. Right eye: No discharge. Left eye: No discharge. Conjunctiva/sclera: Conjunctivae normal.   Cardiovascular:      Rate and Rhythm: Normal rate and regular rhythm. Pulses: Normal pulses. Heart sounds: Normal heart sounds. No murmur heard. No friction rub. No gallop. Pulmonary:      Effort: Pulmonary effort is normal. No respiratory distress. Breath sounds: Normal breath sounds. No stridor. No wheezing, rhonchi or rales. Abdominal:      General: Bowel sounds are normal. There is no distension. Palpations: Abdomen is soft. Tenderness: There is no abdominal tenderness. There is no guarding. Musculoskeletal:         General: No swelling, tenderness, deformity or signs of injury. Right lower leg: No edema. Comments: Left AKA. Skin:     General: Skin is warm. Coloration: Skin is not jaundiced or pale. Findings: No bruising, erythema or rash. Neurological:      General: No focal deficit present. Mental Status: She is alert. Motor: No abnormal muscle tone. Coordination: Coordination normal.      Gait: Gait normal.   Psychiatric:         Mood and Affect: Mood normal.         Behavior: Behavior normal.         Thought Content: Thought content normal.         Judgment: Judgment normal.       Neuro exam (continued):    Left hand with fingertip numbness to light touch. Notes more problems holding things left hand. PT/OT for hand therapy post CVS reviewed      An electronic signature was used to authenticate this note.   -- Lisa Fitzgerald MD

## 2021-08-03 NOTE — PROGRESS NOTES
RM 16    Dr. Ramon Schwartz (kidney specialist) appt on the 9/2//21    Chief Complaint   Patient presents with    Transitions Of Care     Patient discharged from [facility] EM[facility] on 7/23/21, admitted for stroke. 1. Have you been to the ER, urgent care clinic since your last visit? Hospitalized since your last visit? No 7/21/21-7/23/21, HDH, stroke    2. Have you seen or consulted any other health care providers outside of the [address] [place] [address] since your last visit? Include any pap smears or colon screening. No May or June 2021, Cardiologist, follow up. Health Maintenance Due   Topic Date Due    DTaP/Tdap/Td series (1 - Tdap) Never done    Shingrix Vaccine Age 50> (1 of 2) Never done    Foot Exam Q1  09/10/2020    Eye Exam Retinal or Dilated  09/13/2020       Abuse Screening Questionnaire 8/3/2021   Do you ever feel afraid of your partner? N   Are you in a relationship with someone who physically or mentally threatens you? N   Is it safe for you to go home? Y       3 most recent PHQ Screens 8/3/2021   Little interest or pleasure in doing things Not at all   Feeling down, depressed, irritable, or hopeless Not at all   Total Score PHQ 2 0     Fall Risk Assessment, last 12 mths 8/3/2021   Able to walk? Yes   Fall in past 12 months? 0   Do you feel unsteady? 0   Are you worried about falling 0   Number of falls in past 12 months -   Fall with injury?  -       Learning Assessment 6/20/2017   PRIMARY LEARNER Patient   PRIMARY LANGUAGE ENGLISH   LEARNER PREFERENCE PRIMARY READING   ANSWERED BY patient   RELATIONSHIP SELF

## 2021-08-03 NOTE — PATIENT INSTRUCTIONS
1. Dr. Raúl Downs has placed orders for speech, OT and PT that [facility] can see. Please let us know if they need to be clarified/faxed once you review with her therapist.      2.  Please follow the following instructions to process/authorize your referral, if needed:    Referrals processing  Please verify with your insurance IF you need referral authorization submitted. For insurance plans which require this, please follow the following steps. FAILURE TO DO SO MAY RESULT IN INABILITY TO SEE THE SPECIALIST YOU HAVE BEEN REFERRED TO (once you are scheduled to see them). 1. Call and schedule appointment with specialist  2. Call our clinic and leave message with provider name, and date of appointment  3. We will then submit the referral to your insurance. This process takes 2-5 business days. If you have questions about scheduling or authorizing referral, you can review with our referral coordinator. You can review with her today if available/if you have time, or you can call to review once you have made your referral/appointment. If you are not sure if you need referral authorizations, please review with the referral coordinator(s), either prior to or after you have made the appointment, as reviewed.

## 2021-08-04 PROCEDURE — 3331090001 HH PPS REVENUE CREDIT

## 2021-08-04 PROCEDURE — 3331090002 HH PPS REVENUE DEBIT

## 2021-08-05 ENCOUNTER — HOME CARE VISIT (OUTPATIENT)
Dept: SCHEDULING | Facility: HOME HEALTH | Age: 76
End: 2021-08-05
Payer: MEDICARE

## 2021-08-05 VITALS
OXYGEN SATURATION: 97 % | TEMPERATURE: 97.8 F | DIASTOLIC BLOOD PRESSURE: 70 MMHG | SYSTOLIC BLOOD PRESSURE: 122 MMHG | HEART RATE: 72 BPM | RESPIRATION RATE: 16 BRPM

## 2021-08-05 PROCEDURE — G0151 HHCP-SERV OF PT,EA 15 MIN: HCPCS

## 2021-08-05 PROCEDURE — 3331090001 HH PPS REVENUE CREDIT

## 2021-08-05 PROCEDURE — 3331090002 HH PPS REVENUE DEBIT

## 2021-08-06 ENCOUNTER — TELEPHONE (OUTPATIENT)
Dept: INTERNAL MEDICINE CLINIC | Age: 76
End: 2021-08-06

## 2021-08-06 ENCOUNTER — HOME CARE VISIT (OUTPATIENT)
Dept: SCHEDULING | Facility: HOME HEALTH | Age: 76
End: 2021-08-06
Payer: MEDICARE

## 2021-08-06 VITALS
TEMPERATURE: 97.5 F | OXYGEN SATURATION: 98 % | RESPIRATION RATE: 18 BRPM | DIASTOLIC BLOOD PRESSURE: 66 MMHG | SYSTOLIC BLOOD PRESSURE: 160 MMHG | HEART RATE: 64 BPM

## 2021-08-06 VITALS
SYSTOLIC BLOOD PRESSURE: 130 MMHG | DIASTOLIC BLOOD PRESSURE: 70 MMHG | RESPIRATION RATE: 16 BRPM | HEART RATE: 64 BPM | TEMPERATURE: 97 F

## 2021-08-06 PROCEDURE — 3331090001 HH PPS REVENUE CREDIT

## 2021-08-06 PROCEDURE — G0152 HHCP-SERV OF OT,EA 15 MIN: HCPCS

## 2021-08-06 PROCEDURE — G0153 HHCP-SVS OF S/L PATH,EA 15MN: HCPCS

## 2021-08-06 PROCEDURE — 3331090002 HH PPS REVENUE DEBIT

## 2021-08-06 NOTE — TELEPHONE ENCOUNTER
Returned call to [address] [place] [address]  Called patient. She has not been sleeping well the past 2 nights due to pain in leg. This morning 146/64 61  129/52 61 yesterday. Has not yet started taking extra dose of gabapentin (was on 100mg in AM, now increasing to 100mg BID). Discussed okay to take 3rd 100mg dose if pain uncontrolled, but to call and let me know so her script can be adjusted.      Hopeful BP will improve with improved pain and sleep    Gerard Hood MD

## 2021-08-06 NOTE — TELEPHONE ENCOUNTER
----- Message from Mikel Augustin sent at 8/6/2021  1:08 PM EDT -----  Regarding: Dr Kimmie Gold first and last name:[address] Speech  Therapist      Reason for call:said the pt is s/p stroke  this past Saturday one week ago, and during her visit today her BP is 160/66, she usually runs  122/70 or between 120 to 140 top and on bottom usually 60-74, no signs of dizziness or headaches, pt also check BP thru out the day just wanted to make sure Dr Silvina Wu is aware of readings since she just d/c from hosp for stroke  one week ago.       Callback required yes/no and why:yes before the weekend to see what Dr Silvina Wu recommends she will not see pt again until next week so like to have call back today,thanks      Best contact number(s):[phone]      Details to clarify the request:      Mikel Augustin

## 2021-08-07 PROCEDURE — 3331090001 HH PPS REVENUE CREDIT

## 2021-08-07 PROCEDURE — 3331090002 HH PPS REVENUE DEBIT

## 2021-08-08 PROCEDURE — 3331090002 HH PPS REVENUE DEBIT

## 2021-08-08 PROCEDURE — 3331090001 HH PPS REVENUE CREDIT

## 2021-08-09 ENCOUNTER — DOCUMENTATION ONLY (OUTPATIENT)
Dept: INTERNAL MEDICINE CLINIC | Age: 76
End: 2021-08-09

## 2021-08-09 PROCEDURE — 3331090002 HH PPS REVENUE DEBIT

## 2021-08-09 PROCEDURE — 3331090001 HH PPS REVENUE CREDIT

## 2021-08-09 NOTE — PROGRESS NOTES
Home Health, Plan of Care, Provider signed 6/18/2021, Fax confirmed 6/22/2021 to [phone], Confirmation fax scanned in [facility]

## 2021-08-10 ENCOUNTER — HOME CARE VISIT (OUTPATIENT)
Dept: SCHEDULING | Facility: HOME HEALTH | Age: 76
End: 2021-08-10
Payer: MEDICARE

## 2021-08-10 VITALS
DIASTOLIC BLOOD PRESSURE: 60 MMHG | RESPIRATION RATE: 18 BRPM | SYSTOLIC BLOOD PRESSURE: 146 MMHG | HEART RATE: 62 BPM | OXYGEN SATURATION: 99 % | TEMPERATURE: 97.1 F

## 2021-08-10 PROCEDURE — 400016 HH ROC

## 2021-08-10 PROCEDURE — G0157 HHC PT ASSISTANT EA 15: HCPCS

## 2021-08-10 PROCEDURE — 400018 HH-NO PAY CLAIM PROCEDURE

## 2021-08-10 PROCEDURE — G0153 HHCP-SVS OF S/L PATH,EA 15MN: HCPCS

## 2021-08-10 PROCEDURE — 3331090001 HH PPS REVENUE CREDIT

## 2021-08-10 PROCEDURE — 3331090002 HH PPS REVENUE DEBIT

## 2021-08-11 ENCOUNTER — HOME CARE VISIT (OUTPATIENT)
Dept: SCHEDULING | Facility: HOME HEALTH | Age: 76
End: 2021-08-11
Payer: MEDICARE

## 2021-08-11 PROCEDURE — 3331090001 HH PPS REVENUE CREDIT

## 2021-08-11 PROCEDURE — G0152 HHCP-SERV OF OT,EA 15 MIN: HCPCS

## 2021-08-11 PROCEDURE — 3331090002 HH PPS REVENUE DEBIT

## 2021-08-12 ENCOUNTER — HOME CARE VISIT (OUTPATIENT)
Dept: SCHEDULING | Facility: HOME HEALTH | Age: 76
End: 2021-08-12
Payer: MEDICARE

## 2021-08-12 VITALS
RESPIRATION RATE: 16 BRPM | DIASTOLIC BLOOD PRESSURE: 64 MMHG | HEART RATE: 65 BPM | SYSTOLIC BLOOD PRESSURE: 120 MMHG | TEMPERATURE: 98.7 F | OXYGEN SATURATION: 95 %

## 2021-08-12 VITALS
SYSTOLIC BLOOD PRESSURE: 146 MMHG | HEART RATE: 62 BPM | DIASTOLIC BLOOD PRESSURE: 60 MMHG | RESPIRATION RATE: 18 BRPM | TEMPERATURE: 98.1 F | OXYGEN SATURATION: 98 %

## 2021-08-12 PROCEDURE — G0157 HHC PT ASSISTANT EA 15: HCPCS

## 2021-08-12 PROCEDURE — G0153 HHCP-SVS OF S/L PATH,EA 15MN: HCPCS

## 2021-08-12 PROCEDURE — 3331090001 HH PPS REVENUE CREDIT

## 2021-08-12 PROCEDURE — 3331090002 HH PPS REVENUE DEBIT

## 2021-08-13 ENCOUNTER — HOME CARE VISIT (OUTPATIENT)
Dept: SCHEDULING | Facility: HOME HEALTH | Age: 76
End: 2021-08-13
Payer: MEDICARE

## 2021-08-13 VITALS
RESPIRATION RATE: 18 BRPM | OXYGEN SATURATION: 97 % | SYSTOLIC BLOOD PRESSURE: 138 MMHG | TEMPERATURE: 96.7 F | DIASTOLIC BLOOD PRESSURE: 53 MMHG | HEART RATE: 66 BPM

## 2021-08-13 VITALS
OXYGEN SATURATION: 98 % | RESPIRATION RATE: 16 BRPM | TEMPERATURE: 97.7 F | DIASTOLIC BLOOD PRESSURE: 70 MMHG | SYSTOLIC BLOOD PRESSURE: 122 MMHG | HEART RATE: 68 BPM

## 2021-08-13 PROCEDURE — 3331090001 HH PPS REVENUE CREDIT

## 2021-08-13 PROCEDURE — G0152 HHCP-SERV OF OT,EA 15 MIN: HCPCS

## 2021-08-13 PROCEDURE — 3331090002 HH PPS REVENUE DEBIT

## 2021-08-14 PROCEDURE — 3331090001 HH PPS REVENUE CREDIT

## 2021-08-14 PROCEDURE — 3331090002 HH PPS REVENUE DEBIT

## 2021-08-15 PROCEDURE — 3331090001 HH PPS REVENUE CREDIT

## 2021-08-15 PROCEDURE — 3331090002 HH PPS REVENUE DEBIT

## 2021-08-16 VITALS
SYSTOLIC BLOOD PRESSURE: 147 MMHG | RESPIRATION RATE: 16 BRPM | TEMPERATURE: 98 F | DIASTOLIC BLOOD PRESSURE: 64 MMHG | OXYGEN SATURATION: 99 % | HEART RATE: 60 BPM

## 2021-08-16 PROCEDURE — 3331090001 HH PPS REVENUE CREDIT

## 2021-08-16 PROCEDURE — 3331090002 HH PPS REVENUE DEBIT

## 2021-08-17 ENCOUNTER — HOME CARE VISIT (OUTPATIENT)
Dept: SCHEDULING | Facility: HOME HEALTH | Age: 76
End: 2021-08-17
Payer: MEDICARE

## 2021-08-17 VITALS
SYSTOLIC BLOOD PRESSURE: 130 MMHG | RESPIRATION RATE: 16 BRPM | DIASTOLIC BLOOD PRESSURE: 60 MMHG | OXYGEN SATURATION: 98 % | TEMPERATURE: 61 F | HEART RATE: 98 BPM

## 2021-08-17 PROCEDURE — G0157 HHC PT ASSISTANT EA 15: HCPCS

## 2021-08-17 PROCEDURE — G0153 HHCP-SVS OF S/L PATH,EA 15MN: HCPCS

## 2021-08-17 PROCEDURE — 3331090001 HH PPS REVENUE CREDIT

## 2021-08-17 PROCEDURE — G0152 HHCP-SERV OF OT,EA 15 MIN: HCPCS

## 2021-08-17 PROCEDURE — 3331090002 HH PPS REVENUE DEBIT

## 2021-08-18 VITALS
SYSTOLIC BLOOD PRESSURE: 130 MMHG | TEMPERATURE: 97.6 F | RESPIRATION RATE: 16 BRPM | HEART RATE: 60 BPM | OXYGEN SATURATION: 99 % | DIASTOLIC BLOOD PRESSURE: 60 MMHG

## 2021-08-18 PROCEDURE — 3331090002 HH PPS REVENUE DEBIT

## 2021-08-18 PROCEDURE — 3331090001 HH PPS REVENUE CREDIT

## 2021-08-19 ENCOUNTER — HOME CARE VISIT (OUTPATIENT)
Dept: SCHEDULING | Facility: HOME HEALTH | Age: 76
End: 2021-08-19
Payer: MEDICARE

## 2021-08-19 VITALS
OXYGEN SATURATION: 97 % | HEART RATE: 61 BPM | TEMPERATURE: 98 F | RESPIRATION RATE: 16 BRPM | DIASTOLIC BLOOD PRESSURE: 62 MMHG | SYSTOLIC BLOOD PRESSURE: 142 MMHG

## 2021-08-19 VITALS
SYSTOLIC BLOOD PRESSURE: 130 MMHG | TEMPERATURE: 97.9 F | RESPIRATION RATE: 16 BRPM | HEART RATE: 72 BPM | DIASTOLIC BLOOD PRESSURE: 60 MMHG | OXYGEN SATURATION: 98 %

## 2021-08-19 PROCEDURE — G0157 HHC PT ASSISTANT EA 15: HCPCS

## 2021-08-19 PROCEDURE — G0152 HHCP-SERV OF OT,EA 15 MIN: HCPCS

## 2021-08-19 PROCEDURE — 3331090001 HH PPS REVENUE CREDIT

## 2021-08-19 PROCEDURE — G0153 HHCP-SVS OF S/L PATH,EA 15MN: HCPCS

## 2021-08-19 PROCEDURE — 3331090002 HH PPS REVENUE DEBIT

## 2021-08-20 VITALS
DIASTOLIC BLOOD PRESSURE: 62 MMHG | OXYGEN SATURATION: 97 % | SYSTOLIC BLOOD PRESSURE: 148 MMHG | HEART RATE: 61 BPM | TEMPERATURE: 98 F | RESPIRATION RATE: 16 BRPM

## 2021-08-20 VITALS
DIASTOLIC BLOOD PRESSURE: 55 MMHG | RESPIRATION RATE: 18 BRPM | TEMPERATURE: 97.3 F | SYSTOLIC BLOOD PRESSURE: 122 MMHG | HEART RATE: 55 BPM

## 2021-08-20 PROCEDURE — 3331090002 HH PPS REVENUE DEBIT

## 2021-08-20 PROCEDURE — 3331090001 HH PPS REVENUE CREDIT

## 2021-08-21 PROCEDURE — 3331090002 HH PPS REVENUE DEBIT

## 2021-08-21 PROCEDURE — 3331090001 HH PPS REVENUE CREDIT

## 2021-08-22 PROCEDURE — 3331090001 HH PPS REVENUE CREDIT

## 2021-08-22 PROCEDURE — 3331090002 HH PPS REVENUE DEBIT

## 2021-08-23 ENCOUNTER — HOME CARE VISIT (OUTPATIENT)
Dept: SCHEDULING | Facility: HOME HEALTH | Age: 76
End: 2021-08-23
Payer: MEDICARE

## 2021-08-23 PROCEDURE — 3331090002 HH PPS REVENUE DEBIT

## 2021-08-23 PROCEDURE — G0157 HHC PT ASSISTANT EA 15: HCPCS

## 2021-08-23 PROCEDURE — 3331090001 HH PPS REVENUE CREDIT

## 2021-08-24 ENCOUNTER — HOME CARE VISIT (OUTPATIENT)
Dept: SCHEDULING | Facility: HOME HEALTH | Age: 76
End: 2021-08-24
Payer: MEDICARE

## 2021-08-24 VITALS
OXYGEN SATURATION: 100 % | DIASTOLIC BLOOD PRESSURE: 60 MMHG | TEMPERATURE: 97 F | SYSTOLIC BLOOD PRESSURE: 122 MMHG | RESPIRATION RATE: 16 BRPM | HEART RATE: 51 BPM

## 2021-08-24 VITALS
SYSTOLIC BLOOD PRESSURE: 136 MMHG | TEMPERATURE: 97.7 F | HEART RATE: 63 BPM | OXYGEN SATURATION: 97 % | DIASTOLIC BLOOD PRESSURE: 71 MMHG | RESPIRATION RATE: 16 BRPM

## 2021-08-24 PROCEDURE — 3331090001 HH PPS REVENUE CREDIT

## 2021-08-24 PROCEDURE — 3331090002 HH PPS REVENUE DEBIT

## 2021-08-24 PROCEDURE — G0152 HHCP-SERV OF OT,EA 15 MIN: HCPCS

## 2021-08-25 ENCOUNTER — HOME CARE VISIT (OUTPATIENT)
Dept: SCHEDULING | Facility: HOME HEALTH | Age: 76
End: 2021-08-25
Payer: MEDICARE

## 2021-08-25 ENCOUNTER — OFFICE VISIT (OUTPATIENT)
Dept: INTERNAL MEDICINE CLINIC | Age: 76
End: 2021-08-25
Payer: MEDICARE

## 2021-08-25 VITALS
SYSTOLIC BLOOD PRESSURE: 119 MMHG | OXYGEN SATURATION: 94 % | DIASTOLIC BLOOD PRESSURE: 56 MMHG | TEMPERATURE: 98.2 F | WEIGHT: 123 LBS | BODY MASS INDEX: 19.77 KG/M2 | HEART RATE: 73 BPM | HEIGHT: 66 IN

## 2021-08-25 VITALS
SYSTOLIC BLOOD PRESSURE: 126 MMHG | RESPIRATION RATE: 16 BRPM | DIASTOLIC BLOOD PRESSURE: 58 MMHG | HEART RATE: 60 BPM | TEMPERATURE: 97.4 F | OXYGEN SATURATION: 96 %

## 2021-08-25 DIAGNOSIS — E11.51 CONTROLLED TYPE 2 DIABETES MELLITUS WITH DIABETIC PERIPHERAL ANGIOPATHY WITHOUT GANGRENE, WITH LONG-TERM CURRENT USE OF INSULIN (HCC): Primary | ICD-10-CM

## 2021-08-25 DIAGNOSIS — K58.8 OTHER IRRITABLE BOWEL SYNDROME: ICD-10-CM

## 2021-08-25 DIAGNOSIS — M81.0 AGE-RELATED OSTEOPOROSIS WITHOUT CURRENT PATHOLOGICAL FRACTURE: ICD-10-CM

## 2021-08-25 DIAGNOSIS — Z79.4 CONTROLLED TYPE 2 DIABETES MELLITUS WITH DIABETIC PERIPHERAL ANGIOPATHY WITHOUT GANGRENE, WITH LONG-TERM CURRENT USE OF INSULIN (HCC): Primary | ICD-10-CM

## 2021-08-25 DIAGNOSIS — E13.51 PERIPHERAL VASCULAR DISEASE DUE TO SECONDARY DIABETES (HCC): ICD-10-CM

## 2021-08-25 DIAGNOSIS — I51.89 VENTRICULAR HYPOKINESIS: ICD-10-CM

## 2021-08-25 DIAGNOSIS — I69.30 HISTORY OF STROKE WITH CURRENT RESIDUAL EFFECTS: ICD-10-CM

## 2021-08-25 DIAGNOSIS — E55.9 VITAMIN D DEFICIENCY: ICD-10-CM

## 2021-08-25 DIAGNOSIS — I10 ESSENTIAL HYPERTENSION: ICD-10-CM

## 2021-08-25 LAB — HBA1C MFR BLD HPLC: 5.8 %

## 2021-08-25 PROCEDURE — G8427 DOCREV CUR MEDS BY ELIG CLIN: HCPCS | Performed by: INTERNAL MEDICINE

## 2021-08-25 PROCEDURE — 3331090001 HH PPS REVENUE CREDIT

## 2021-08-25 PROCEDURE — 1101F PT FALLS ASSESS-DOCD LE1/YR: CPT | Performed by: INTERNAL MEDICINE

## 2021-08-25 PROCEDURE — G8754 DIAS BP LESS 90: HCPCS | Performed by: INTERNAL MEDICINE

## 2021-08-25 PROCEDURE — G0157 HHC PT ASSISTANT EA 15: HCPCS

## 2021-08-25 PROCEDURE — G8752 SYS BP LESS 140: HCPCS | Performed by: INTERNAL MEDICINE

## 2021-08-25 PROCEDURE — 83036 HEMOGLOBIN GLYCOSYLATED A1C: CPT | Performed by: INTERNAL MEDICINE

## 2021-08-25 PROCEDURE — 99214 OFFICE O/P EST MOD 30 MIN: CPT | Performed by: INTERNAL MEDICINE

## 2021-08-25 PROCEDURE — G8536 NO DOC ELDER MAL SCRN: HCPCS | Performed by: INTERNAL MEDICINE

## 2021-08-25 PROCEDURE — G8420 CALC BMI NORM PARAMETERS: HCPCS | Performed by: INTERNAL MEDICINE

## 2021-08-25 PROCEDURE — G8510 SCR DEP NEG, NO PLAN REQD: HCPCS | Performed by: INTERNAL MEDICINE

## 2021-08-25 PROCEDURE — 1090F PRES/ABSN URINE INCON ASSESS: CPT | Performed by: INTERNAL MEDICINE

## 2021-08-25 PROCEDURE — 3331090002 HH PPS REVENUE DEBIT

## 2021-08-25 NOTE — PROGRESS NOTES
A/P:  Kelley Paz is a 64 y.o. female, she presents today for:    1. Controlled type 2 diabetes mellitus with diabetic peripheral angiopathy without gangrene, with long-term current use of insulin (HCC)  -     AMB POC HEMOGLOBIN A1C  2. Other irritable bowel syndrome  3. Essential hypertension  -     amLODIPine (NORVASC) 10 mg tablet; Take 1 Tablet by mouth daily. , Normal, Disp-90 Tablet, R-4  4. Vitamin D deficiency  -     cholecalciferol, vitamin D3, (Vitamin D3) 50 mcg (2,000 unit) tab; Take 1 Tablet by mouth daily. , Normal, Disp-90 Tablet, R-1  5. Age-related osteoporosis without current pathological fracture  -     cholecalciferol, vitamin D3, (Vitamin D3) 50 mcg (2,000 unit) tab; Take 1 Tablet by mouth daily. , Normal, Disp-90 Tablet, R-1  6. Ventricular hypokinesis  7. History of stroke with current residual effects    CVA: 2021 admitted  - MRI with right frontal infarcts. - symptoms including: left hand numbness, dysarthria. Both improving.    - completing home health therapies appropriately. - on apixaban for  History of embolic stroke  - PFO found on echo - during recent hospitalization advised by Dr. Lorena Lopez - neurology to add ASA   - has not yet scheduled for follow-up with neurology   -needs monitoring of platelets    Venticular hypokinesis: without syptoms of heart failure. Echocardiogram in hospital showin21  Moderate LVH: mildly reduced systolic function (89-12%), severe hypokinesis of basal-midanterolateral, inferolateral and inferior. also noted MV with mod regurg, tricuspit with mod regurg and moderate pulm htn)   - no rprior hypokinesis noted on  RANJAN    Intermittent episodes of gas and frequent stool: No blood no significant pain. discussed possible dietary causes.  At this time is going to try and reduce sugar free jello (currently eating 3 times a day) Reduce sugar free foods to help improve     Follow-up and Dispositions    · Return in about 4 months (around 12/25/2021) for follow-up diabetes. Future Appointments   Date Time Provider Inge Rodriguezisti   8/25/2021 12:00 PM Saira Liner [facility]   8/26/2021 10:00 AM Yesika Ghosh [facility]   8/30/2021 To Be Determined Barbie Linares [facility]   8/31/2021 To Be Determined Saira Liner [facility]   9/1/2021 To Be Determined Irina Hutchison OT [facility]   9/2/2021 To Be Determined Finnbear Randolphgi [facility]       HPI    3 bowel movements last night. Increased gas, not diarrhea. - comes and goes. Occurring for 2-3 days per month. No blood no pain. No nausea. No history of bleeding ulcer nor diverticulitis. Has a good appetite. No problem with     Has appointment for prosthesis later today. Speech therapist \"Let her go. Left side of the body remains week. - most notable in the fingertips. PMH/PSH: reviewed and updated  Sochx/Famhx: reviewed and updated     All: No Known Allergies  Med:   Current Outpatient Medications   Medication Sig    gabapentin (NEURONTIN) 100 mg capsule TAKE 2 CAPSULE BY MOUTH DAILY.  Aspirin Low Dose 81 mg tablet TAKE 1 TABLET BY MOUTH DAILY    sodium bicarbonate 650 mg tablet Take 650 mg by mouth three (3) times daily.  amLODIPine (NORVASC) 5 mg tablet Take 1 Tablet by mouth daily. (Patient taking differently: Take 10 mg by mouth daily. take 2 tablets by mouth daily)    cloNIDine HCL (CATAPRES) 0.2 mg tablet Take 1 Tablet by mouth two (2) times a day.  Blood-Glucose Meter (True Metrix Air Glucose Meter) monitoring kit Use to test blood sugar on average 3 times per day. Dx code: E11.51 and Z79.4    glucose blood VI test strips (True Metrix Glucose Test Strip) strip TEST BLOOD SUGAR ON AVERAGE 3 TIMES DAILY.  alcohol swabs (BD Single Use Swabs Regular) padm Use as directed.     lancets (TRUEplus Lancets) 33 gauge misc Use one lancet to test blood sugar three times a day  losartan (COZAAR) 25 mg tablet TAKE 1 TABLET BY MOUTH EVERY DAY    carbamide peroxide (DEBROX) 6.5 % otic solution Administer 5 Drops into each ear two (2) times a day.  Eliquis 2.5 mg tablet Take 1 Tab by mouth two (2) times a day.  atorvastatin (LIPITOR) 10 mg tablet Take 1 Tab by mouth daily.  carvediloL (COREG) 25 mg tablet Take 25 mg by mouth two (2) times a day.  Vitamin D3 50 mcg (2,000 unit) tab TAKE 1 TABLET BY MOUTH EVERY DAY (Patient taking differently: Indications: unsure if they should continue this)    lancets (PRODIGY TWIST TOP LANCET) 28 gauge misc To measure blood sugar 3-4 times daily. Dx code E 11.51, Z79.4    gum/pva/dental adhesive (SUPER DENTURE ADHESIVE DT)     mupirocin (BACTROBAN) 2 % ointment Apply  to affected area two (2) times a day. (Patient not taking: Reported on 8/25/2021)     No current facility-administered medications for this visit. ROS pertinent for the following:  Review of Systems   Constitutional: Negative for chills, fever and malaise/fatigue. Respiratory: Negative for shortness of breath. Cardiovascular: Negative for chest pain. PE:  Blood pressure (!) 119/56, pulse 73, temperature 98.2 °F (36.8 °C), height 5' 5.5\" (1.664 m), weight 123 lb (55.8 kg), SpO2 94 %. Body mass index is 20.16 kg/m². Physical Exam  Vitals and nursing note reviewed. Constitutional:       General: She is not in acute distress. Appearance: Normal appearance. HENT:      Head: Normocephalic and atraumatic. Nose: Nose normal.      Mouth/Throat:      Mouth: Mucous membranes are moist.   Eyes:      Extraocular Movements: Extraocular movements intact. Conjunctiva/sclera: Conjunctivae normal.      Pupils: Pupils are equal, round, and reactive to light. Cardiovascular:      Rate and Rhythm: Normal rate and regular rhythm. Pulses: Normal pulses. Heart sounds: Normal heart sounds.    Pulmonary:      Effort: Pulmonary effort is normal. Breath sounds: Normal breath sounds. Musculoskeletal:         General: Normal range of motion. Cervical back: Normal range of motion and neck supple. Comments: Left side AKA     Skin:     General: Skin is warm and dry. Neurological:      Mental Status: She is alert and oriented to person, place, and time. Mental status is at baseline. Psychiatric:         Mood and Affect: Mood normal.         Behavior: Behavior normal.         Labs:   See addendum for interpretation of labs resulting after time of visit. Results for orders placed or performed in visit on 08/25/21   AMB POC HEMOGLOBIN A1C   Result Value Ref Range    Hemoglobin A1c (POC) 5.8 %         She was given AVS and expressed understanding with the diagnosis and plan as discussed. An electronic signature was used to authenticate this note.   -- Ronnie Alfredo MD

## 2021-08-25 NOTE — PROGRESS NOTES
RM 1    Chief Complaint   Patient presents with    Diabetes       Visit Vitals  BP (!) 119/56   Pulse 73   Temp 98.2 °F (36.8 °C)   Ht 5' 5.5\" (1.664 m)   Wt 123 lb (55.8 kg)   SpO2 94%   BMI 20.16 kg/m²       3 most recent PHQ Screens 8/25/2021   Little interest or pleasure in doing things Not at all   Feeling down, depressed, irritable, or hopeless Not at all   Total Score PHQ 2 0         1. Have you been to the ER, urgent care clinic since your last visit? Hospitalized since your last visit? 7/21-7/23, [address] [facility] for stroke, saw mariajose 8/3/21    2. Have you seen or consulted any other health care providers outside of the [address] [place] [address] since your last visit? Include any pap smears or colon screening. No    Health Maintenance Due   Topic Date Due    DTaP/Tdap/Td series (1 - Tdap) Never done    Shingrix Vaccine Age 50> (1 of 2) Never done    Foot Exam Q1  09/10/2020    Eye Exam Retinal or Dilated  09/13/2020       Learning Assessment 6/20/2017   PRIMARY LEARNER Patient   PRIMARY LANGUAGE ENGLISH   LEARNER PREFERENCE PRIMARY READING   ANSWERED BY patient   RELATIONSHIP SELF     AVS  education, follow up, and recommendations provided and addressed with patient.

## 2021-08-25 NOTE — PATIENT INSTRUCTIONS
- reduce sugar free foods   - follow-up with neurologist.        Diet for Irritable Bowel Syndrome: Care Instructions  Your Care Instructions     Irritable bowel syndrome, or IBS, is a problem with the intestines. IBS can cause belly pain, bloating, gas, constipation, and diarrhea. Most people can control their symptoms by changing their diet and easing stress. No specific foods cause everyone with IBS to have symptoms. Many people find that they feel better by limiting or eliminating foods that may bring on symptoms. Make sure you don't stop eating all foods from any one food group without talking with a dietitian. You need to make sure you are still getting all the nutrients you need. Follow-up care is a key part of your treatment and safety. Be sure to make and go to all appointments, and call your doctor if you are having problems. It's also a good idea to know your test results and keep a list of the medicines you take. How can you care for yourself at home? To reduce constipation  · Include fruits, vegetables, beans, and whole grains in your diet each day. These foods are high in fiber. Slowly increase the amount of fiber you eat. This helps you avoid a lot of gas. · Drink plenty of fluids. If you have kidney, heart, or liver disease and have to limit fluids, talk with your doctor before you increase the amount of fluids you drink. · Get some exercise every day. Build up slowly to 30 to 60 minutes a day on 5 or more days of the week. · Take a fiber supplement, such as Citrucel or Metamucil, every day if needed. Read and follow all instructions on the label. · Schedule time each day for a bowel movement. Having a daily routine may help. Take your time and do not strain when having a bowel movement. · Check with your doctor before you increase the amount of fiber in your diet. For some people who have IBS, eating more fiber may make some symptoms worse. This includes bloating.   To reduce diarrhea  You may try giving up foods or drinks one at a time to see whether symptoms improve. Limit or avoid the following:  · Alcohol  · Caffeine, which is found in coffee, tea, cola drinks, and chocolate  · Nicotine, from smoking or chewing tobacco  · Gas-producing foods, such as beans, broccoli, cabbage, and apples  · Dairy products that contain lactose (milk sugar), such as ice cream and milk. · Foods and drinks high in sugar, especially fruit juice, soda, candy, and other packaged sweets (such as cookies)  · Foods high in fat, including bridges, sausage, butter, oils, and anything deep-fried  · Sorbitol and xylitol, artificial sweeteners found in some sugarless candies and chewing gum  Keep track of foods  · Some people with IBS use a daily food diary to keep track of what they eat and whether they have any symptoms after eating certain foods. The diary also can be a good way to record what is going on in your life. · Stress plays a role in IBS. So if you are aware that certain stresses bring on symptoms, you can try to reduce those stresses. Keep mealtimes pleasant  · Try to maintain a pleasant environment when you eat. This may reduce stress that can make symptoms likely to occur. · Give yourself plenty of time to eat, rather than eating on the go. Chew your food slowly. Try not to swallow air, which can cause bloating. Where can you learn more? Go to http://www.gray.com/  Enter G945 in the search box to learn more about \"Diet for Irritable Bowel Syndrome: Care Instructions. \"  Current as of: December 17, 2020               Content Version: 12.8  © 0884-1637 Healthwise, Incorporated. Care instructions adapted under license by [employer] (which disclaims liability or warranty for this information).  If you have questions about a medical condition or this instruction, always ask your healthcare professional. [place] [address] any warranty or liability for your use of this information.

## 2021-08-26 ENCOUNTER — HOME CARE VISIT (OUTPATIENT)
Dept: SCHEDULING | Facility: HOME HEALTH | Age: 76
End: 2021-08-26
Payer: MEDICARE

## 2021-08-26 PROCEDURE — G0152 HHCP-SERV OF OT,EA 15 MIN: HCPCS

## 2021-08-26 PROCEDURE — 3331090001 HH PPS REVENUE CREDIT

## 2021-08-26 PROCEDURE — 3331090002 HH PPS REVENUE DEBIT

## 2021-08-27 VITALS
TEMPERATURE: 97.7 F | DIASTOLIC BLOOD PRESSURE: 60 MMHG | HEART RATE: 59 BPM | OXYGEN SATURATION: 99 % | SYSTOLIC BLOOD PRESSURE: 142 MMHG

## 2021-08-27 PROCEDURE — 3331090001 HH PPS REVENUE CREDIT

## 2021-08-27 PROCEDURE — 3331090002 HH PPS REVENUE DEBIT

## 2021-08-28 PROCEDURE — 3331090001 HH PPS REVENUE CREDIT

## 2021-08-28 PROCEDURE — 3331090002 HH PPS REVENUE DEBIT

## 2021-08-29 ENCOUNTER — TELEPHONE (OUTPATIENT)
Dept: INTERNAL MEDICINE CLINIC | Age: 76
End: 2021-08-29

## 2021-08-29 PROBLEM — I69.30 HISTORY OF STROKE WITH CURRENT RESIDUAL EFFECTS: Status: ACTIVE | Noted: 2020-06-26

## 2021-08-29 PROBLEM — I51.89 VENTRICULAR HYPOKINESIS: Status: ACTIVE | Noted: 2021-08-29

## 2021-08-29 PROCEDURE — 3331090002 HH PPS REVENUE DEBIT

## 2021-08-29 PROCEDURE — 3331090001 HH PPS REVENUE CREDIT

## 2021-08-29 RX ORDER — CHOLECALCIFEROL (VITAMIN D3) 125 MCG
1 CAPSULE ORAL DAILY
Qty: 90 TABLET | Refills: 1 | Status: SHIPPED | OUTPATIENT
Start: 2021-08-29 | End: 2022-04-14

## 2021-08-29 RX ORDER — AMLODIPINE BESYLATE 10 MG/1
10 TABLET ORAL DAILY
Qty: 90 TABLET | Refills: 4 | Status: SHIPPED | OUTPATIENT
Start: 2021-08-29 | End: 2022-04-29 | Stop reason: SDUPTHER

## 2021-08-29 NOTE — TELEPHONE ENCOUNTER
Please call to follow-up recent visit. 1. I reviewed notes from hospital stay again this weekend. - heart test showed some changes in how the muscle of the heart was working- I would like Dalila Horton to follow-up with cardiology. Previously saw Dr. Jesus Manuel Silverman. I have placed the referral.    2. I have sent an updated rx for amlodipine at new dose of 10mg.      3. Confirm she scheduled with her neurologist, Dr. Miles Henriquez    Thanks  Lucy Weston MD

## 2021-08-30 ENCOUNTER — HOME CARE VISIT (OUTPATIENT)
Dept: SCHEDULING | Facility: HOME HEALTH | Age: 76
End: 2021-08-30
Payer: MEDICARE

## 2021-08-30 ENCOUNTER — TELEPHONE (OUTPATIENT)
Dept: INTERNAL MEDICINE CLINIC | Age: 76
End: 2021-08-30

## 2021-08-30 VITALS
RESPIRATION RATE: 16 BRPM | TEMPERATURE: 97 F | SYSTOLIC BLOOD PRESSURE: 140 MMHG | DIASTOLIC BLOOD PRESSURE: 60 MMHG | OXYGEN SATURATION: 98 % | HEART RATE: 60 BPM

## 2021-08-30 PROCEDURE — G0157 HHC PT ASSISTANT EA 15: HCPCS

## 2021-08-30 PROCEDURE — G0152 HHCP-SERV OF OT,EA 15 MIN: HCPCS

## 2021-08-30 PROCEDURE — 3331090002 HH PPS REVENUE DEBIT

## 2021-08-30 PROCEDURE — 3331090001 HH PPS REVENUE CREDIT

## 2021-08-30 NOTE — TELEPHONE ENCOUNTER
Patient is scheduled to see neurologist 9/28/21    Patients son on phi reports he will call the cardiologists office to schedule the appointment. Advised new rx for new dosage of amlodipine sent to pharmacy     No questions. Nothing else needed at this time .

## 2021-08-31 VITALS
TEMPERATURE: 98.1 F | OXYGEN SATURATION: 97 % | HEART RATE: 62 BPM | SYSTOLIC BLOOD PRESSURE: 141 MMHG | DIASTOLIC BLOOD PRESSURE: 64 MMHG | RESPIRATION RATE: 16 BRPM

## 2021-08-31 PROCEDURE — 3331090002 HH PPS REVENUE DEBIT

## 2021-08-31 PROCEDURE — 3331090001 HH PPS REVENUE CREDIT

## 2021-09-01 ENCOUNTER — HOME CARE VISIT (OUTPATIENT)
Dept: SCHEDULING | Facility: HOME HEALTH | Age: 76
End: 2021-09-01
Payer: MEDICARE

## 2021-09-01 VITALS
RESPIRATION RATE: 16 BRPM | TEMPERATURE: 97 F | DIASTOLIC BLOOD PRESSURE: 70 MMHG | HEART RATE: 50 BPM | OXYGEN SATURATION: 98 % | SYSTOLIC BLOOD PRESSURE: 120 MMHG

## 2021-09-01 PROCEDURE — 3331090001 HH PPS REVENUE CREDIT

## 2021-09-01 PROCEDURE — G0152 HHCP-SERV OF OT,EA 15 MIN: HCPCS

## 2021-09-01 PROCEDURE — 3331090002 HH PPS REVENUE DEBIT

## 2021-09-02 ENCOUNTER — HOME CARE VISIT (OUTPATIENT)
Dept: SCHEDULING | Facility: HOME HEALTH | Age: 76
End: 2021-09-02
Payer: MEDICARE

## 2021-09-02 PROCEDURE — 3331090002 HH PPS REVENUE DEBIT

## 2021-09-02 PROCEDURE — 3331090003 HH PPS REVENUE ADJ

## 2021-09-02 PROCEDURE — G0151 HHCP-SERV OF PT,EA 15 MIN: HCPCS

## 2021-09-02 PROCEDURE — 3331090001 HH PPS REVENUE CREDIT

## 2021-09-03 VITALS
DIASTOLIC BLOOD PRESSURE: 64 MMHG | SYSTOLIC BLOOD PRESSURE: 124 MMHG | OXYGEN SATURATION: 97 % | TEMPERATURE: 97.8 F | RESPIRATION RATE: 16 BRPM | HEART RATE: 72 BPM

## 2021-09-03 PROCEDURE — 3331090001 HH PPS REVENUE CREDIT

## 2021-09-03 PROCEDURE — 3331090002 HH PPS REVENUE DEBIT

## 2021-09-04 PROCEDURE — 3331090002 HH PPS REVENUE DEBIT

## 2021-09-04 PROCEDURE — 3331090001 HH PPS REVENUE CREDIT

## 2021-09-05 PROCEDURE — 3331090002 HH PPS REVENUE DEBIT

## 2021-09-05 PROCEDURE — 3331090001 HH PPS REVENUE CREDIT

## 2021-09-06 PROCEDURE — 3331090002 HH PPS REVENUE DEBIT

## 2021-09-06 PROCEDURE — 3331090001 HH PPS REVENUE CREDIT

## 2021-09-16 ENCOUNTER — OFFICE VISIT (OUTPATIENT)
Dept: CARDIOLOGY CLINIC | Age: 76
End: 2021-09-16
Payer: MEDICARE

## 2021-09-16 VITALS
BODY MASS INDEX: 19.77 KG/M2 | SYSTOLIC BLOOD PRESSURE: 100 MMHG | DIASTOLIC BLOOD PRESSURE: 60 MMHG | OXYGEN SATURATION: 100 % | RESPIRATION RATE: 16 BRPM | HEIGHT: 66 IN | WEIGHT: 123 LBS | HEART RATE: 56 BPM

## 2021-09-16 DIAGNOSIS — I42.8 OTHER CARDIOMYOPATHY (HCC): Primary | ICD-10-CM

## 2021-09-16 DIAGNOSIS — I10 ESSENTIAL HYPERTENSION: ICD-10-CM

## 2021-09-16 DIAGNOSIS — I69.30 HISTORY OF STROKE WITH CURRENT RESIDUAL EFFECTS: ICD-10-CM

## 2021-09-16 DIAGNOSIS — I51.89 VENTRICULAR HYPOKINESIS: ICD-10-CM

## 2021-09-16 DIAGNOSIS — S78.112A UNILATERAL AKA, LEFT (HCC): ICD-10-CM

## 2021-09-16 PROCEDURE — G8754 DIAS BP LESS 90: HCPCS | Performed by: INTERNAL MEDICINE

## 2021-09-16 PROCEDURE — G8427 DOCREV CUR MEDS BY ELIG CLIN: HCPCS | Performed by: INTERNAL MEDICINE

## 2021-09-16 PROCEDURE — 1101F PT FALLS ASSESS-DOCD LE1/YR: CPT | Performed by: INTERNAL MEDICINE

## 2021-09-16 PROCEDURE — G8432 DEP SCR NOT DOC, RNG: HCPCS | Performed by: INTERNAL MEDICINE

## 2021-09-16 PROCEDURE — G8536 NO DOC ELDER MAL SCRN: HCPCS | Performed by: INTERNAL MEDICINE

## 2021-09-16 PROCEDURE — G8420 CALC BMI NORM PARAMETERS: HCPCS | Performed by: INTERNAL MEDICINE

## 2021-09-16 PROCEDURE — 1090F PRES/ABSN URINE INCON ASSESS: CPT | Performed by: INTERNAL MEDICINE

## 2021-09-16 PROCEDURE — 99214 OFFICE O/P EST MOD 30 MIN: CPT | Performed by: INTERNAL MEDICINE

## 2021-09-16 PROCEDURE — G8752 SYS BP LESS 140: HCPCS | Performed by: INTERNAL MEDICINE

## 2021-09-16 NOTE — PROGRESS NOTES
[address]: Cyrus Lee  [phone]    History of Present Illness:   Ms. Bud Smith is a 73 yo F with chronic kidney disease followed by Dr. Ryanne Welsh, type 2 diabetes, essential hypertension, history of PAD with complicated gangrene s/p amputation of her left leg in 2016 with an AKA, history of tobacco use, quit many years ago. Since her last visit, she has had some records. Back in 06/2020, she had a stroke and subsequent cardiac workup including transthoracic echocardiogram and transesophageal echocardiogram demonstrated preserved LV function, but there was a question of PFO with bubble study. Per the son who helps give a history, she had followup with Dr. Ngozi Valenzuela and when they relooked there was no evidence of PFO. She was started on Eliquis for her embolic stroke. When she saw Dr. Beatriz Romero they also added aspirin. She had another hospital admission and a stroke in 07/2021. The echocardiogram there reported an ejection fraction of 45-50% with hypokinesis of the anterolateral, inferolateral and inferior wall. From a symptom standpoint, she denies any chest pain. Her breathing has been stable. No significant palpitations, lightheadedness or dizziness. She has been compliant with her medications. She is compensated on exam with clear lungs and no lower extremity edema. AKA on the left side. Soc hx. Quit tobacco 25 yo. Fam hx. No early CAD. Assessment and Plan:   1. Cardiomyopathy. Will obtain an echocardiogram for further evaluation. She is not having symptoms. The focus is likely going to be medical management. She is already on statin and medications for blood pressure. Depending on the echocardiogram results, may potentially have her follow back in six months. 2. Embolic stroke. Anticoagulation. Would continue her Eliquis and aspirin per neurology. 3. Peripheral vascular disease, status post left AKA. 4. Type 2 diabetes. 5. Essential hypertension.     6. History of tobacco use.   7. Chronic kidney disease. Followed by Dr. Grey Orlando. She  has a past medical history of Cerebrovascular accident (CVA) due to embolism of cerebral artery ([email or web address] (6/26/2020), Diabetes ([facility] Hypertension, and Peripheral vascular disease due to secondary diabetes ([facility] (1/28/2019). All other systems negative except as above. PE  Vitals:    09/16/21 0905   BP: 100/60   Pulse: (!) 56   Resp: 16   SpO2: 100%   Weight: 123 lb (55.8 kg)   Height: 5' 5.5\" (1.664 m)    Body mass index is 20.16 kg/m².    General appearance - alert, well appearing, and in no distress  Mental status - affect appropriate to mood  Eyes - sclera anicteric, moist mucous membranes  Neck - supple, no JVD  Chest - clear to auscultation, no wheezes, rales or rhonchi  Heart - normal rate, regular rhythm, normal S1, S2, no murmurs, rubs, clicks or gallops  Abdomen - soft, nontender, nondistended, no masses or organomegaly  Neurological - no focal deficit  Musculoskeletal - no muscular tenderness noted, normal strength  Extremities - Left AKA, right trace edema      Recent Labs:  Lab Results   Component Value Date/Time    Cholesterol, total 61 05/26/2021 11:14 AM    HDL Cholesterol 46 05/26/2021 11:14 AM    LDL, calculated NEG 3 05/26/2021 11:14 AM    Triglyceride 90 05/26/2021 11:14 AM    CHOL/HDL Ratio 1.3 05/26/2021 11:14 AM     Lab Results   Component Value Date/Time    Creatinine 1.87 (H) 07/08/2020 08:23 AM     Lab Results   Component Value Date/Time    BUN 53 (H) 07/08/2020 08:23 AM     Lab Results   Component Value Date/Time    Potassium 7.0 (>) 07/08/2020 08:23 AM     Lab Results   Component Value Date/Time    Hemoglobin A1c 6.2 (H) 09/10/2019 12:43 PM     Lab Results   Component Value Date/Time    HGB 10.3 (L) 05/26/2021 11:14 AM     Lab Results   Component Value Date/Time    PLATELET 877 97/53/5771 11:14 AM       Reviewed:  Past Medical History:   Diagnosis Date    Cerebrovascular accident (CVA) due to embolism of cerebral artery ([facility] 6/26/2020    Diabetes ([facility]     Hypertension     Peripheral vascular disease due to secondary diabetes ([facility] 1/28/2019     Social History     Tobacco Use   Smoking Status Former Smoker    Years: 15.00   Smokeless Tobacco Never Used     Social History     Substance and Sexual Activity   Alcohol Use No     No Known Allergies    Current Outpatient Medications   Medication Sig    cholecalciferol, vitamin D3, (Vitamin D3) 50 mcg (2,000 unit) tab Take 1 Tablet by mouth daily.  amLODIPine (NORVASC) 10 mg tablet Take 1 Tablet by mouth daily.  gabapentin (NEURONTIN) 100 mg capsule TAKE 2 CAPSULE BY MOUTH DAILY. (Patient taking differently: Take 100 mg by mouth two (2) times a day. TAKE 1 CAPSULE BY MOUTH 2 x DAILY. )    Aspirin Low Dose 81 mg tablet TAKE 1 TABLET BY MOUTH DAILY    sodium bicarbonate 650 mg tablet Take 650 mg by mouth three (3) times daily.  cloNIDine HCL (CATAPRES) 0.2 mg tablet Take 1 Tablet by mouth two (2) times a day.  Blood-Glucose Meter (True Metrix Air Glucose Meter) monitoring kit Use to test blood sugar on average 3 times per day. Dx code: E11.51 and Z79.4    glucose blood VI test strips (True Metrix Glucose Test Strip) strip TEST BLOOD SUGAR ON AVERAGE 3 TIMES DAILY.  lancets (TRUEplus Lancets) 33 gauge misc Use one lancet to test blood sugar three times a day    losartan (COZAAR) 25 mg tablet TAKE 1 TABLET BY MOUTH EVERY DAY    carbamide peroxide (DEBROX) 6.5 % otic solution Administer 5 Drops into each ear two (2) times a day.  Eliquis 2.5 mg tablet Take 1 Tab by mouth two (2) times a day.  atorvastatin (LIPITOR) 10 mg tablet Take 1 Tab by mouth daily.  carvediloL (COREG) 25 mg tablet Take 25 mg by mouth two (2) times a day.  lancets (PRODIGY TWIST TOP LANCET) 28 gauge misc To measure blood sugar 3-4 times daily.  Dx code E 11.51, Z79.4    gum/pva/dental adhesive (SUPER DENTURE ADHESIVE DT)     alcohol swabs (BD Single Use Swabs Regular) padm Use as directed. No current facility-administered medications for this visit.        Marcia Gresham MD  [facility] [place]  [address] [facility] [address]  [address]

## 2021-10-08 ENCOUNTER — ANCILLARY PROCEDURE (OUTPATIENT)
Dept: CARDIOLOGY CLINIC | Age: 76
End: 2021-10-08
Payer: MEDICARE

## 2021-10-08 VITALS — HEIGHT: 65 IN | WEIGHT: 123 LBS | BODY MASS INDEX: 20.49 KG/M2

## 2021-10-08 DIAGNOSIS — I10 ESSENTIAL HYPERTENSION: ICD-10-CM

## 2021-10-08 DIAGNOSIS — I51.89 VENTRICULAR HYPOKINESIS: ICD-10-CM

## 2021-10-08 DIAGNOSIS — S78.112A UNILATERAL AKA, LEFT (HCC): ICD-10-CM

## 2021-10-08 DIAGNOSIS — I42.8 OTHER CARDIOMYOPATHY (HCC): ICD-10-CM

## 2021-10-08 DIAGNOSIS — I69.30 HISTORY OF STROKE WITH CURRENT RESIDUAL EFFECTS: ICD-10-CM

## 2021-10-08 LAB
ECHO AO ASC DIAM: 2.83 CM
ECHO AO ROOT DIAM: 2.63 CM
ECHO AV AREA PEAK VELOCITY: 1.48 CM2
ECHO AV AREA VTI: 1.71 CM2
ECHO AV AREA/BSA PEAK VELOCITY: 0.9 CM2/M2
ECHO AV AREA/BSA VTI: 1.1 CM2/M2
ECHO AV MEAN GRADIENT: 3.98 MMHG
ECHO AV PEAK GRADIENT: 7.48 MMHG
ECHO AV PEAK VELOCITY: 136.77 CM/S
ECHO AV VTI: 30.98 CM
ECHO IVC PROX: 1.4 CM
ECHO LA AREA 4C: 26.78 CM2
ECHO LA MAJOR AXIS: 4.09 CM
ECHO LA MINOR AXIS: 2.54 CM
ECHO LA VOL 2C: 102.84 ML (ref 22–52)
ECHO LA VOL 4C: 88.07 ML (ref 22–52)
ECHO LA VOL BP: 101.88 ML (ref 22–52)
ECHO LA VOL/BSA BIPLANE: 63.28 ML/M2 (ref 16–28)
ECHO LA VOLUME INDEX A2C: 63.88 ML/M2 (ref 16–28)
ECHO LA VOLUME INDEX A4C: 54.7 ML/M2 (ref 16–28)
ECHO LV E' LATERAL VELOCITY: 4.39 CM/S
ECHO LV E' SEPTAL VELOCITY: 4.84 CM/S
ECHO LV EDV A2C: 66.91 ML
ECHO LV EDV A4C: 88.36 ML
ECHO LV EDV BP: 76.72 ML (ref 56–104)
ECHO LV EDV INDEX A4C: 54.9 ML/M2
ECHO LV EDV INDEX BP: 47.7 ML/M2
ECHO LV EDV NDEX A2C: 41.6 ML/M2
ECHO LV EJECTION FRACTION A2C: 52 PERCENT
ECHO LV EJECTION FRACTION A4C: 54 PERCENT
ECHO LV EJECTION FRACTION BIPLANE: 51.2 PERCENT (ref 55–100)
ECHO LV ESV A2C: 32.22 ML
ECHO LV ESV A4C: 41.01 ML
ECHO LV ESV BP: 37.41 ML (ref 19–49)
ECHO LV ESV INDEX A2C: 20 ML/M2
ECHO LV ESV INDEX A4C: 25.5 ML/M2
ECHO LV ESV INDEX BP: 23.2 ML/M2
ECHO LV INTERNAL DIMENSION DIASTOLIC: 4.77 CM (ref 3.9–5.3)
ECHO LV INTERNAL DIMENSION SYSTOLIC: 3.04 CM
ECHO LV IVSD: 1.41 CM (ref 0.6–0.9)
ECHO LV MASS 2D: 238 G (ref 67–162)
ECHO LV MASS INDEX 2D: 147.8 G/M2 (ref 43–95)
ECHO LV POSTERIOR WALL DIASTOLIC: 1.15 CM (ref 0.6–0.9)
ECHO LVOT DIAM: 1.77 CM
ECHO LVOT PEAK GRADIENT: 2.7 MMHG
ECHO LVOT PEAK VELOCITY: 82.17 CM/S
ECHO LVOT SV: 53.1 ML
ECHO LVOT VTI: 21.51 CM
ECHO MV A VELOCITY: 115.52 CM/S
ECHO MV E DECELERATION TIME (DT): 210.8 MS
ECHO MV E VELOCITY: 101.21 CM/S
ECHO MV E/A RATIO: 0.88
ECHO MV E/E' LATERAL: 23.05
ECHO MV E/E' RATIO (AVERAGED): 21.98
ECHO MV E/E' SEPTAL: 20.91
ECHO PV MAX VELOCITY: 93.38 CM/S
ECHO PV PEAK INSTANTANEOUS GRADIENT SYSTOLIC: 3.49 MMHG
ECHO RA MINOR AXIS: 3.68 CM
ECHO RV INTERNAL DIMENSION: 3.47 CM
ECHO RV TAPSE: 2.07 CM (ref 1.5–2)
ECHO TV REGURGITANT MAX VELOCITY: 311.36 CM/S
ECHO TV REGURGITANT PEAK GRADIENT: 38.78 MMHG
LA VOL DISK BP: 97.18 ML (ref 22–52)

## 2021-10-08 PROCEDURE — 93306 TTE W/DOPPLER COMPLETE: CPT | Performed by: INTERNAL MEDICINE

## 2021-10-12 ENCOUNTER — TELEPHONE (OUTPATIENT)
Dept: CARDIOLOGY CLINIC | Age: 76
End: 2021-10-12

## 2021-10-12 NOTE — TELEPHONE ENCOUNTER
----- Message from Arnoldo Willams MD sent at 10/8/2021  5:09 PM EDT -----  Please let pt know echo was overall ok. LV function is normal. There is mild to moderate leakiness of the mitral valve  (regurgitation). Likely repeat echo in 1 yr. Please schedule follow up in 6 months.  thx

## 2021-10-12 NOTE — TELEPHONE ENCOUNTER
Patient son returned call. Two patient indentifiers verified. He was informed of the results. Appointment scheduled.      Future Appointments   Date Time Provider Inge Aburto   10/14/2021 11:00 AM Yogesh Stark MD NEUR[facility]TC BS AMB   12/29/2021 10:00 AM Leah Christensen MD CP BS AMB   4/13/2022  9:20 AM MD ANDIE Owusu BS AMB

## 2021-10-14 ENCOUNTER — OFFICE VISIT (OUTPATIENT)
Dept: NEUROLOGY | Age: 76
End: 2021-10-14
Payer: MEDICARE

## 2021-10-14 VITALS
DIASTOLIC BLOOD PRESSURE: 64 MMHG | SYSTOLIC BLOOD PRESSURE: 152 MMHG | RESPIRATION RATE: 20 BRPM | HEART RATE: 55 BPM | OXYGEN SATURATION: 98 %

## 2021-10-14 DIAGNOSIS — I63.9 ACUTE CVA (CEREBROVASCULAR ACCIDENT) (HCC): Primary | ICD-10-CM

## 2021-10-14 PROCEDURE — G8753 SYS BP > OR = 140: HCPCS | Performed by: PSYCHIATRY & NEUROLOGY

## 2021-10-14 PROCEDURE — G8754 DIAS BP LESS 90: HCPCS | Performed by: PSYCHIATRY & NEUROLOGY

## 2021-10-14 PROCEDURE — G8427 DOCREV CUR MEDS BY ELIG CLIN: HCPCS | Performed by: PSYCHIATRY & NEUROLOGY

## 2021-10-14 PROCEDURE — G8432 DEP SCR NOT DOC, RNG: HCPCS | Performed by: PSYCHIATRY & NEUROLOGY

## 2021-10-14 PROCEDURE — G8536 NO DOC ELDER MAL SCRN: HCPCS | Performed by: PSYCHIATRY & NEUROLOGY

## 2021-10-14 PROCEDURE — 1101F PT FALLS ASSESS-DOCD LE1/YR: CPT | Performed by: PSYCHIATRY & NEUROLOGY

## 2021-10-14 PROCEDURE — 1090F PRES/ABSN URINE INCON ASSESS: CPT | Performed by: PSYCHIATRY & NEUROLOGY

## 2021-10-14 PROCEDURE — 99204 OFFICE O/P NEW MOD 45 MIN: CPT | Performed by: PSYCHIATRY & NEUROLOGY

## 2021-10-14 PROCEDURE — G8420 CALC BMI NORM PARAMETERS: HCPCS | Performed by: PSYCHIATRY & NEUROLOGY

## 2021-10-14 NOTE — PROGRESS NOTES
NEUROLOGY CLINIC NOTE    Patient ID:  Agustina Painting  [id number]  87 y.o.  1945    Date of Consultation:  October 14, 2021    Referring Physician: Dr. Hailee Tristan    Reason for Consultation:  History of stroke    Chief Complaint   Patient presents with    New Patient     stroke        History of Present Illness:     Patient Active Problem List    Diagnosis Date Noted    Ventricular hypokinesis 08/29/2021    History of stroke with current residual effects 06/26/2020    Peripheral vascular disease due to secondary diabetes ([address] 01/28/2019    Age-related osteoporosis without current pathological fracture 08/07/2017    CKD stage G3b/A2, GFR 30-44 and albumin creatinine ratio  mg/g ([address] 06/21/2017    Diabetic nephropathy associated with type 2 diabetes mellitus ([address] 06/21/2017    Essential hypertension 06/20/2017    Controlled type 2 diabetes mellitus with circulatory disorder ([address] 06/20/2017    Diabetic polyneuropathy associated with type 2 diabetes mellitus ([address] 06/20/2017    Unilateral AKA, left ([address] 06/20/2017     Past Medical History:   Diagnosis Date    Cerebrovascular accident (CVA) due to embolism of cerebral artery ([address] 6/26/2020    Diabetes [facility]     Hypertension     Peripheral vascular disease due to secondary diabetes ([facility] 1/28/2019      Past Surgical History:   Procedure Laterality Date    HX ABOVE KNEE AMPUTATION  02/06/2017    HX BELOW KNEE AMPUTATION      HX GYN      hysterectomy      Prior to Admission medications    Medication Sig Start Date End Date Taking? Authorizing Provider   cholecalciferol, vitamin D3, (Vitamin D3) 50 mcg (2,000 unit) tab Take 1 Tablet by mouth daily. 8/29/21  Yes Everett Malik MD   amLODIPine (NORVASC) 10 mg tablet Take 1 Tablet by mouth daily. 8/29/21  Yes Everett Malik MD   gabapentin (NEURONTIN) 100 mg capsule TAKE 2 CAPSULE BY MOUTH DAILY. Patient taking differently: Take 100 mg by mouth two (2) times a day.  TAKE 1 CAPSULE BY MOUTH 2 x DAILY. 8/5/21  Yes Álvaro Lewis MD   Aspirin Low Dose 81 mg tablet TAKE 1 TABLET BY MOUTH DAILY 7/23/21  Yes Provider, Historical   sodium bicarbonate 650 mg tablet Take 650 mg by mouth three (3) times daily. Yes Viji Tate MD   cloNIDine HCL (CATAPRES) 0.2 mg tablet Take 1 Tablet by mouth two (2) times a day. 5/28/21  Yes Álvaro Lewis MD   Blood-Glucose Meter (True Metrix Air Glucose Meter) monitoring kit Use to test blood sugar on average 3 times per day. Dx code: E11.51 and Z79.4 5/28/21  Yes Álvaro Lewis MD   glucose blood VI test strips (True Metrix Glucose Test Strip) strip TEST BLOOD SUGAR ON AVERAGE 3 TIMES DAILY. 5/28/21  Yes Álvaro Lewis MD   lancets (TRUEplus Lancets) 33 gauge misc Use one lancet to test blood sugar three times a day 5/28/21  Yes Álvaro Lewis MD   losartan (COZAAR) 25 mg tablet TAKE 1 TABLET BY MOUTH EVERY DAY 5/10/21  Yes Provider, Historical   Eliquis 2.5 mg tablet Take 1 Tab by mouth two (2) times a day. 8/7/20  Yes Álvaro Lewis MD   atorvastatin (LIPITOR) 10 mg tablet Take 1 Tab by mouth daily. 8/2/20  Yes Álvaro Lewis MD   carvediloL (COREG) 25 mg tablet Take 25 mg by mouth two (2) times a day. 6/26/20  Yes Provider, Historical   lancets (PRODIGY TWIST TOP LANCET) 28 gauge misc To measure blood sugar 3-4 times daily. Dx code E 11.51, Z79.4 10/17/18  Yes Álvaro Lewis MD   gum/pva/dental adhesive (SUPER DENTURE ADHESIVE DT)  7/11/18  Yes Provider, Historical   alcohol swabs (BD Single Use Swabs Regular) padm Use as directed. Patient not taking: Reported on 10/14/2021 5/28/21   Álvaro Lewis MD   carbamide peroxide [place]) 6.5 % otic solution Administer 5 Drops into each ear two (2) times a day.   Patient not taking: Reported on 10/14/2021 5/26/21   Álvaro Lewis MD     No Known Allergies   Social History     Tobacco Use    Smoking status: Former Smoker     Years: 15.00    Smokeless tobacco: Never Used   Substance Use Topics    Alcohol use: No      Family History   Problem Relation Age of Onset    Diabetes Mother     Diabetes Father     Heart Disease Father     Diabetes Brother     Diabetes Brother     Diabetes Brother         Subjective: Nallely Swan is a 64 y.o. history of CVA x2, diabetes, hypertension, PAD status post amputation of her left leg (AKA) who was referred here by Dr. Zia Escudero for further evaluation after a stroke. Patient was seen at her PCP office 8/3/2021 for follow-up after discharge from [facility] on 7/23/2021 for stroke. Patient was complaining of left hand tingling and slurred speech with reportedly an initially negative head CT. Brain MRI done revealed multiple small acute infarcts in the right frontal lobe. Patient then was only complaining of left hand numbness. Patient was seen by cardiology 9/16/2021. Note mentions prior stroke 6/20/2020 with cardiac work-up that included TTE and RANJAN revealed question of possible PFO during bubble study. Repeat testing did not show any evidence of PFO. Patient was started on Eliquis for embolic stroke and placed by neurology on aspirin. Had another hospital admission July 2021 for stroke. Echocardiogram revealed EF of 45 to 50% with hypokinesis of the anterolateral, inferolateral inferior wall. Plan was to obtain another echocardiogram and to continue Eliquis and aspirin. Echocardiogram done revealed EF of 55 to 60%. Mild to moderate mitral valve regurgitation. Hemoglobin A1c done 8/25/2021 was 5.8. Per patient both of her strokes affected mainly the left upper extremity and slurred speech. Those have improved and the only thing that remains is numbness of her left hand. Strength is okay. She is compliant with her Eliquis and aspirin daily for stroke prophylaxis. Denies any side effects.     Outside reports reviewed: office notes, radiology reports, lab reports. Review of Systems:    A comprehensive review of systems was performed:   Constitutional: positive for none  Eyes: positive for vision problems  Ears, nose, mouth, throat, and face: positive for none  Respiratory: positive for none  Cardiovascular: positive for high blood pressure  Gastrointestinal: positive for none  Genitourinary: positive for none  Integument/breast: positive for none  Hematologic/lymphatic: positive for none  Musculoskeletal: positive for none  Neurological: positive for memory loss  Behavioral/Psych: positive for none  Endocrine: positive for none  Allergic/Immunologic: positive for none      Objective:     Visit Vitals  BP (!) 152/64   Pulse (!) 55   Resp 20   LMP  (LMP Unknown)   SpO2 98%       PHYSICAL EXAM:    General Appearance: Alert, patient appears stated age. General:  Well developed, well nourished, patient in no apparent distress. Head/Face: The head is normocephalic and atraumatic. Eyes: Conjunctivae appear normal. Sclera appear normal and non-icteric. Nose (and Sinus):   No abnormality of the nose or sinuses is noted. Oral:   Throat is clear. Lymphatics:  No lymphadenopathy in the neck/head. Neck and Thyroid:   No bruits noted in the neck. Respiratory:  Lungs clear to auscultation. Cardiovascular:  Palpation and auscultation: regular rate and rhythm. Extremity: No joint swelling, erythema or pedal edema. Left AKA     NEUROLOGICAL EXAM:    Appearance: The patient is well developed, well nourished, provides a coherent history and is in no acute distress. Mental Status: Oriented to place and person. Fluent, no aphasia or dysarthria. Mood and affect appropriate. Issues with short term memory. Cranial Nerves:   Intact visual fields. KERLINE, EOM's full, no nystagmus, no ptosis. Facial sensation is normal. Corneal reflexes are intact. Facial movement is symmetric. Hearing is normal bilaterally. Palate is midline with normal elevation.  Sternocleidomastoid and trapezius muscles are normal. Tongue is midline. Motor:  5/5 strength with left AKA. Normal bulk and tone. No pronator drift. Reflexes:   Deep tendon reflexes 1+/4. Sensory:   Normal to cold and pinprick even on the left hand. Gait:  Unable to tested due to left AKA. Tremor:   No tremor noted. Cerebellar:  Intact FTN/HAWK.         Assessment:   Acute CVA    Plan:   Neurological examination does not reveal any residual left sided weakness or actual left hand sensory deficit to correlate with the subjective complaint. Status post stroke x2. Brain MRI done revealed multiple small acute infarcts in the right frontal lobe. Echocardiogram recently done did not reveal any significant pathology. I need to obtain a copy of her hospitalization records to see the extent of the stroke work-up and if there is any other risk stratification work-up that needs to be done. For now continue aspirin 81 mg daily and Eliquis for stroke prophylaxis. Discussed with patient and family member that she is already on penultimate treatment for stroke prevention and there is no other option. They understood. Advised strict compliance with dietary modifications and medications for cholesterol and hypertension. Advised to do routine structured mental and physical exercises. Fall precautions. All questions and concerns were answered. Visit lasted 55 minutes. Greater 50% was spent reviewing available medical records, discussion about her condition, etiology, prognosis, stroke risk and prevention, need to obtain medical records for further review, fall precautions, routine structured physical and mental exercises, treatment options, medication    This note was created using voice recognition software. Despite editing, there may be syntax errors.

## 2021-10-14 NOTE — PATIENT INSTRUCTIONS
Learning About How to Prevent a Stroke  What is a stroke? A stroke occurs when a blood vessel in the brain bursts or is blocked by a blood clot. Without blood and the oxygen it carries, part of the brain starts to die. The part of the body controlled by the damaged area of the brain can't work properly. But there are many things you can do to help lower your stroke risk. What increases your risk for stroke? A risk factor is anything that makes you more likely to have a particular health problem. Risk factors for stroke that you can treat or change include:  · Health problems like high blood pressure, atrial fibrillation, diabetes, and high cholesterol. · Smoking. · Heavy use of alcohol. · Being overweight. · Not getting enough physical activity. Risk factors you can't change include:  · Age. The risk of stroke goes up as you get older. · Race.  Americans, Native Americans, and [place] Natives have a higher risk than those of other races. · Being female. Women have a higher risk of stroke than men. · Family history of stroke. Your doctor can help you know your risk. Then you and your can doctor talk about whether you need to lower it. What can you do to prevent a stroke? · Treat any health problems you have that raise your risk. · Adopt a heart-healthy lifestyle:  ? Don't smoke. If you need help quitting, talk to your doctor about stop-smoking programs and medicines. These can increase your chances of quitting for good. ? Limit alcohol to 2 drinks a day for men and 1 drink a day for women. ? Stay at a healthy weight. Lose weight if you need to.  ? If your doctor recommends it, get more exercise. Walking is a good choice. Bit by bit, increase the amount you walk every day. Try for at least 30 minutes on most days of the week. ? Eat heart-healthy foods. These include fruits, vegetables, high-fiber foods, and fish.  Choose foods that are low in sodium, saturated fat, and trans fat.  · Decide with your doctor whether you will also take medicines to help lower your risk. For example, you and your doctor may decide you will take a medicine that prevents blood clots. What are the symptoms of a stroke? Symptoms of a stroke happen quickly. A stroke may cause:  · Sudden numbness, tingling, weakness, or loss of movement in your face, arm, or leg, especially on only one side of your body. · Sudden vision changes. · Sudden trouble speaking. · Sudden confusion or trouble understanding simple statements. · Sudden problems with walking or balance. · A sudden, severe headache that is different from past headaches. FAST is a simple way to remember the main symptoms of stroke. Recognizing these symptoms helps you know when to call for medical help. FAST stands for:  · F ace drooping. · A rm weakness. · S peech difficulty. · T lizzy to call 911. It's important to call for medical help if you have stroke symptoms. Quick treatment may save your life. And it may reduce the damage in your brain so that you have fewer problems after the stroke. Follow-up care is a key part of your treatment and safety. Be sure to make and go to all appointments, and call your doctor if you are having problems. It's also a good idea to know your test results and keep a list of the medicines you take. Where can you learn more? Go to http://www.gray.com/  Enter G757 in the search box to learn more about \"Learning About How to Prevent a Stroke. \"  Current as of: July 6, 2021               Content Version: 13.0  © 2006-2021 Healthwise, [facility]. Care instructions adapted under license by [employer] (which disclaims liability or warranty for this information). If you have questions about a medical condition or this instruction, always ask your healthcare professional. [place] [address] any warranty or liability for your use of this information.          Learning About How to Prevent Another Stroke  What can you do to prevent another stroke? After a stroke, people feel lots of different emotions. Some people are worried that they could have another stroke. Or they may feel overwhelmed by how much there is to learn and do. Some people feel sad or depressed. No matter what emotions you are feeling, you can give yourself some control and peace of mind by making a plan to lower your risk of having another stroke. Take your medicines  You'll need to take medicines to help prevent another stroke. Be sure to take your medicines exactly as prescribed. And don't stop taking them unless your doctor tells you to. If you stop taking your medicines, you can increase your risk of having another stroke. Some of the medicines your doctor may prescribe include:  · Aspirin or some other blood thinner to prevent blood clots. · Statins and other medicines to lower cholesterol. · Blood pressure medicines to lower blood pressure. Manage other health problems  You can help lower your chance of having another stroke by managing certain other health problems. Problems that increase your risk of having another stroke include:  · High blood pressure. · High cholesterol. · Atrial fibrillation. · Diabetes. If you have any of these health problems, you can manage them with healthy lifestyle changes along with medicine. Adopt a healthy lifestyle  · Do not smoke or allow others to smoke around you. If you need help quitting, talk to your doctor about stop-smoking programs and medicines. These can increase your chances of quitting for good. Smoking makes a stroke more likely. · Limit alcohol to 2 drinks a day for men and 1 drink a day for women. · Lose weight if you need to. Controlling your weight will help you keep your heart and body healthy. · Be active. Ask your doctor what type and level of activity is safe for you.   · Eat heart-healthy foods, like fruits, vegetables, and high-fiber foods.  It's also important to:  · Get a flu shot every year. · Ask for help if you think you are depressed. Do stroke rehab  Taking part in a stroke rehabilitation (rehab) program will help you to regain skills you lost or make the most of your abilities after a stroke. It also helps you take steps to prevent another stroke. Your rehab team will give you education and support to help you build new, healthy habits. You'll learn how to manage any other health problems that you might have. Bertram Fatima also learn how to exercise safely, eat a healthy diet, and quit smoking if you smoke. Bertram Fatima work with your team to decide what lifestyle choices are best for you. If your doctor hasn't already suggested it, ask him or her if stroke rehab is right for you. Know stroke symptoms  Make sure you know the symptoms of stroke. FAST is a simple way to remember. Recognizing these symptoms helps you know when to call for medical help. FAST stands for:  · F ace drooping. · A rm weakness. · S peech difficulty. · T lizzy to call 911. Follow-up care is a key part of your treatment and safety. Be sure to make and go to all appointments, and call your doctor if you are having problems. It's also a good idea to know your test results and keep a list of the medicines you take. Where can you learn more? Go to http://www.gray.com/  Enter X336 in the search box to learn more about \"Learning About How to Prevent Another Stroke. \"  Current as of: July 6, 2021               Content Version: 13.0  © 2006-2021 Healthwise, [employer]. Care instructions adapted under license by [employer] (which disclaims liability or warranty for this information). If you have questions about a medical condition or this instruction, always ask your healthcare professional. [address] any warranty or liability for your use of this information.

## 2021-10-14 NOTE — PROGRESS NOTES
Stroke- July 2021  Previous 2019 minor     Lost feeling in her fingers, speech was slower   8 visits of speech and OT therapy     Symptoms:  Fingertips were feeling different, she could use her hand like she was supposed to, bad feeling in her left arm, speech was off     No feeling in the fingertips of her left hand still

## 2021-10-14 NOTE — LETTER
10/16/2021    Patient: Pilar Smith   YOB: 1945   Date of Visit: 10/14/2021     Mireya Ramos MD  [facility] [id number]  Via [facility] [address] [place] [address], [address] [place] [address]  Via In [facility]    Dear MD Charly Augustine MD,      Thank you for referring Ms. Pilar Smith to [facility] for evaluation. My notes for this consultation are attached. If you have questions, please do not hesitate to call me. I look forward to following your patient along with you.       Sincerely,    Antoine Rodriguez MD

## 2021-10-30 DIAGNOSIS — I63.40 CEREBROVASCULAR ACCIDENT (CVA) DUE TO EMBOLISM OF CEREBRAL ARTERY (HCC): ICD-10-CM

## 2021-10-30 DIAGNOSIS — E11.51 CONTROLLED TYPE 2 DIABETES MELLITUS WITH DIABETIC PERIPHERAL ANGIOPATHY WITHOUT GANGRENE, WITH LONG-TERM CURRENT USE OF INSULIN (HCC): ICD-10-CM

## 2021-10-30 DIAGNOSIS — Z79.4 CONTROLLED TYPE 2 DIABETES MELLITUS WITH DIABETIC PERIPHERAL ANGIOPATHY WITHOUT GANGRENE, WITH LONG-TERM CURRENT USE OF INSULIN (HCC): ICD-10-CM

## 2021-11-01 RX ORDER — ATORVASTATIN CALCIUM 10 MG/1
TABLET, FILM COATED ORAL
Qty: 90 TABLET | Refills: 3 | Status: SHIPPED | OUTPATIENT
Start: 2021-11-01 | End: 2022-07-20

## 2021-11-01 RX ORDER — LANCETS 33 GAUGE
EACH MISCELLANEOUS
Qty: 300 EACH | Refills: 3 | Status: SHIPPED | OUTPATIENT
Start: 2021-11-01

## 2021-11-01 RX ORDER — ISOPROPYL ALCOHOL 0.75 G/1
SWAB TOPICAL
Qty: 300 PAD | Refills: 3 | Status: SHIPPED | OUTPATIENT
Start: 2021-11-01 | End: 2022-09-09 | Stop reason: SDUPTHER

## 2022-03-18 PROBLEM — E11.42 DIABETIC POLYNEUROPATHY ASSOCIATED WITH TYPE 2 DIABETES MELLITUS (HCC): Status: ACTIVE | Noted: 2017-06-20

## 2022-03-19 PROBLEM — E11.21 DIABETIC NEPHROPATHY ASSOCIATED WITH TYPE 2 DIABETES MELLITUS (HCC): Status: ACTIVE | Noted: 2017-06-21

## 2022-03-19 PROBLEM — N18.32 CKD STAGE G3B/A2, GFR 30-44 AND ALBUMIN CREATININE RATIO 30-299 MG/G (HCC): Status: ACTIVE | Noted: 2017-06-21

## 2022-03-19 PROBLEM — I10 ESSENTIAL HYPERTENSION: Status: ACTIVE | Noted: 2017-06-20

## 2022-03-19 PROBLEM — M81.0 AGE-RELATED OSTEOPOROSIS WITHOUT CURRENT PATHOLOGICAL FRACTURE: Status: ACTIVE | Noted: 2017-08-07

## 2022-03-19 PROBLEM — E11.59 CONTROLLED TYPE 2 DIABETES MELLITUS WITH CIRCULATORY DISORDER (HCC): Status: ACTIVE | Noted: 2017-06-20

## 2022-03-19 PROBLEM — S78.112A UNILATERAL AKA, LEFT (HCC): Status: ACTIVE | Noted: 2017-06-20

## 2022-03-19 PROBLEM — I51.89 VENTRICULAR HYPOKINESIS: Status: ACTIVE | Noted: 2021-08-29

## 2022-03-19 PROBLEM — E13.51 PERIPHERAL VASCULAR DISEASE DUE TO SECONDARY DIABETES (HCC): Status: ACTIVE | Noted: 2019-01-28

## 2022-03-19 PROBLEM — I69.30 HISTORY OF STROKE WITH CURRENT RESIDUAL EFFECTS: Status: ACTIVE | Noted: 2020-06-26

## 2022-04-08 ENCOUNTER — TELEPHONE (OUTPATIENT)
Dept: INTERNAL MEDICINE CLINIC | Age: 77
End: 2022-04-08

## 2022-04-08 NOTE — TELEPHONE ENCOUNTER
Patient discharged today from [facility] for pneumonia. - please contact today or Monday to verify status - confirm follow-up - currently cheduled for 4/20.  We can keep this or if patient is having concerns, try to schedule sooner      Octavia Woo MD

## 2022-04-08 NOTE — TELEPHONE ENCOUNTER
----- Message from Samaria Lal sent at 4/8/2022  2:14 PM EDT -----  Subject: Message to Provider    QUESTIONS  Information for Provider? Cassie Vela is calling from [facility]   and would like a call back in regards to needing a approval to start home   health on the pt ; Cassie Vela can be reached at [phone]  ---------------------------------------------------------------------------  --------------  CALL BACK INFO  What is the best way for the office to contact you? OK to leave message on   voicemail  Preferred Call Back Phone Number? [phone]  ---------------------------------------------------------------------------  --------------  SCRIPT ANSWERS  Relationship to Patient? Third Party  Third Party Type? Home Health Care?    Representative Name? [facility]

## 2022-04-08 NOTE — TELEPHONE ENCOUNTER
Patient admitted for 2 days at [facility] - pneumonia. 4/6-4/8 PT and OT.      Provided verbal orders    Karo Cole MD

## 2022-04-08 NOTE — TELEPHONE ENCOUNTER
Placed call to home health agency for more details, no answer. I left detailed message to have them return call to office.       Karen Johnston LPN

## 2022-04-11 NOTE — TELEPHONE ENCOUNTER
Spoke with patient and she stated that she is feeling ok. She is planning to keep her appointment in office on 4/20/22. PT voiced understanding.       Osei Shi LPN

## 2022-04-14 DIAGNOSIS — I10 ESSENTIAL HYPERTENSION: ICD-10-CM

## 2022-04-14 DIAGNOSIS — E55.9 VITAMIN D DEFICIENCY: ICD-10-CM

## 2022-04-14 DIAGNOSIS — Z79.4 CONTROLLED TYPE 2 DIABETES MELLITUS WITH DIABETIC PERIPHERAL ANGIOPATHY WITHOUT GANGRENE, WITH LONG-TERM CURRENT USE OF INSULIN (HCC): ICD-10-CM

## 2022-04-14 DIAGNOSIS — E11.51 CONTROLLED TYPE 2 DIABETES MELLITUS WITH DIABETIC PERIPHERAL ANGIOPATHY WITHOUT GANGRENE, WITH LONG-TERM CURRENT USE OF INSULIN (HCC): ICD-10-CM

## 2022-04-14 DIAGNOSIS — M81.0 AGE-RELATED OSTEOPOROSIS WITHOUT CURRENT PATHOLOGICAL FRACTURE: ICD-10-CM

## 2022-04-14 DIAGNOSIS — G62.9 NEUROPATHY: ICD-10-CM

## 2022-04-14 RX ORDER — GABAPENTIN 100 MG/1
100 CAPSULE ORAL 2 TIMES DAILY
Qty: 180 CAPSULE | Refills: 0 | Status: SHIPPED | OUTPATIENT
Start: 2022-04-14 | End: 2022-07-26

## 2022-04-14 RX ORDER — CLONIDINE HYDROCHLORIDE 0.2 MG/1
TABLET ORAL
Qty: 180 TABLET | Refills: 1 | Status: SHIPPED | OUTPATIENT
Start: 2022-04-14 | End: 2022-09-09 | Stop reason: SDUPTHER

## 2022-04-14 RX ORDER — BLOOD-GLUCOSE METER
EACH MISCELLANEOUS
Qty: 1 KIT | Refills: 0 | Status: SHIPPED | OUTPATIENT
Start: 2022-04-14 | End: 2022-07-20

## 2022-04-14 RX ORDER — ACETAMINOPHEN 500 MG
TABLET ORAL
Qty: 90 CAPSULE | Refills: 5 | Status: SHIPPED | OUTPATIENT
Start: 2022-04-14

## 2022-04-29 ENCOUNTER — OFFICE VISIT (OUTPATIENT)
Dept: INTERNAL MEDICINE CLINIC | Age: 77
End: 2022-04-29
Payer: MEDICARE

## 2022-04-29 VITALS
DIASTOLIC BLOOD PRESSURE: 55 MMHG | RESPIRATION RATE: 16 BRPM | BODY MASS INDEX: 20.49 KG/M2 | SYSTOLIC BLOOD PRESSURE: 118 MMHG | HEART RATE: 54 BPM | TEMPERATURE: 97.6 F | WEIGHT: 123 LBS | OXYGEN SATURATION: 97 % | HEIGHT: 65 IN

## 2022-04-29 DIAGNOSIS — E05.90 HYPERTHYROIDISM: ICD-10-CM

## 2022-04-29 DIAGNOSIS — I42.8 OTHER CARDIOMYOPATHY (HCC): Primary | ICD-10-CM

## 2022-04-29 DIAGNOSIS — R79.89 LOW TSH LEVEL: ICD-10-CM

## 2022-04-29 DIAGNOSIS — N18.32 CKD STAGE G3B/A2, GFR 30-44 AND ALBUMIN CREATININE RATIO 30-299 MG/G (HCC): ICD-10-CM

## 2022-04-29 DIAGNOSIS — I10 ESSENTIAL HYPERTENSION: ICD-10-CM

## 2022-04-29 DIAGNOSIS — Z89.612 S/P AKA (ABOVE KNEE AMPUTATION) UNILATERAL, LEFT (HCC): ICD-10-CM

## 2022-04-29 DIAGNOSIS — I63.40 CEREBROVASCULAR ACCIDENT (CVA) DUE TO EMBOLISM OF CEREBRAL ARTERY (HCC): ICD-10-CM

## 2022-04-29 DIAGNOSIS — J96.01 ACUTE RESPIRATORY FAILURE WITH HYPOXIA (HCC): ICD-10-CM

## 2022-04-29 DIAGNOSIS — I69.30 HISTORY OF STROKE WITH CURRENT RESIDUAL EFFECTS: ICD-10-CM

## 2022-04-29 DIAGNOSIS — E13.51 PERIPHERAL VASCULAR DISEASE DUE TO SECONDARY DIABETES (HCC): ICD-10-CM

## 2022-04-29 PROCEDURE — 1090F PRES/ABSN URINE INCON ASSESS: CPT | Performed by: INTERNAL MEDICINE

## 2022-04-29 PROCEDURE — G8536 NO DOC ELDER MAL SCRN: HCPCS | Performed by: INTERNAL MEDICINE

## 2022-04-29 PROCEDURE — G8510 SCR DEP NEG, NO PLAN REQD: HCPCS | Performed by: INTERNAL MEDICINE

## 2022-04-29 PROCEDURE — 1101F PT FALLS ASSESS-DOCD LE1/YR: CPT | Performed by: INTERNAL MEDICINE

## 2022-04-29 PROCEDURE — G8752 SYS BP LESS 140: HCPCS | Performed by: INTERNAL MEDICINE

## 2022-04-29 PROCEDURE — G8754 DIAS BP LESS 90: HCPCS | Performed by: INTERNAL MEDICINE

## 2022-04-29 PROCEDURE — G8420 CALC BMI NORM PARAMETERS: HCPCS | Performed by: INTERNAL MEDICINE

## 2022-04-29 PROCEDURE — 99215 OFFICE O/P EST HI 40 MIN: CPT | Performed by: INTERNAL MEDICINE

## 2022-04-29 PROCEDURE — G8427 DOCREV CUR MEDS BY ELIG CLIN: HCPCS | Performed by: INTERNAL MEDICINE

## 2022-04-29 RX ORDER — APIXABAN 2.5 MG/1
2.5 TABLET, FILM COATED ORAL 2 TIMES DAILY
Qty: 180 TABLET | Refills: 0 | Status: SHIPPED | OUTPATIENT
Start: 2022-04-29 | End: 2022-09-09 | Stop reason: SDUPTHER

## 2022-04-29 RX ORDER — METHIMAZOLE 10 MG/1
10 TABLET ORAL DAILY
Qty: 30 TABLET | Refills: 1 | Status: SHIPPED | OUTPATIENT
Start: 2022-04-29 | End: 2022-06-22 | Stop reason: SDUPTHER

## 2022-04-29 RX ORDER — AMLODIPINE BESYLATE 10 MG/1
10 TABLET ORAL DAILY
Qty: 90 TABLET | Refills: 4 | Status: SHIPPED | OUTPATIENT
Start: 2022-04-29 | End: 2022-09-26

## 2022-04-29 RX ORDER — FUROSEMIDE 40 MG/1
40 TABLET ORAL DAILY
COMMUNITY
Start: 2022-04-08 | End: 2022-04-29 | Stop reason: SDUPTHER

## 2022-04-29 RX ORDER — METHIMAZOLE 10 MG/1
10 TABLET ORAL DAILY
COMMUNITY
Start: 2022-04-08 | End: 2022-04-29 | Stop reason: SDUPTHER

## 2022-04-29 RX ORDER — FUROSEMIDE 40 MG/1
40 TABLET ORAL DAILY
Qty: 30 TABLET | Refills: 2 | Status: SHIPPED | OUTPATIENT
Start: 2022-04-29 | End: 2022-06-30 | Stop reason: SDUPTHER

## 2022-04-29 NOTE — PROGRESS NOTES
RM 1    Chief Complaint   Patient presents with   [facility] Follow Up       Visit Vitals  BP (!) 118/55 (BP 1 Location: Right arm, BP Patient Position: Sitting, BP Cuff Size: Adult)   Pulse (!) 54   Temp 97.6 °F (36.4 °C) (Temporal)   Resp 16   Ht 5' 5\" (1.651 m)   Wt 123 lb (55.8 kg)   SpO2 97%   BMI 20.47 kg/m²       3 most recent PHQ Screens 4/29/2022   Little interest or pleasure in doing things Not at all   Feeling down, depressed, irritable, or hopeless Not at all   Total Score PHQ 2 0         1. Have you been to the ER, urgent care clinic since your last visit? Hospitalized since your last visit? Yes, Pt was hospitilized 3 weeks ago    2. Have you seen or consulted any other health care providers outside of the [address] [place] [address] since your last visit? Include any pap smears or colon screening. No    Health Maintenance Due   Topic Date Due    DTaP/Tdap/Td series (1 - Tdap) Never done    Shingrix Vaccine Age 50> (1 of 2) Never done    Foot Exam Q1  09/10/2020    Eye Exam Retinal or Dilated  09/13/2020    COVID-19 Vaccine (3 - Booster for Pfizer series) 08/27/2021    MICROALBUMIN Q1  05/01/2022    Medicare Yearly Exam  05/27/2022       Learning Assessment 6/20/2017   PRIMARY LEARNER Patient   PRIMARY LANGUAGE ENGLISH   LEARNER PREFERENCE PRIMARY READING   ANSWERED BY patient   RELATIONSHIP SELF         AVS  education, follow up, and recommendations provided and addressed with patient.  services used to advise patient. No

## 2022-04-29 NOTE — PROGRESS NOTES
A/P:  Raj Mccann is a 64 y.o. female, she presents today for:    1. Other cardiomyopathy ([facility].)  -     furosemide (LASIX) 40 mg tablet; Take 1 Tablet by mouth daily. , Normal, Disp-30 Tablet, R-2  -     METABOLIC PANEL, COMPREHENSIVE; Future  2. Cerebrovascular accident (CVA) due to embolism of cerebral artery (HCC)  -     Eliquis 2.5 mg tablet; Take 1 Tablet by mouth two (2) times a day., Normal, Disp-180 Tablet, R-0, VITALY  3. Peripheral vascular disease due to secondary diabetes ([facility]  4. CKD stage G3b/A2, GFR 30-44 and albumin creatinine ratio  mg/g (HCC)  -     furosemide (LASIX) 40 mg tablet; Take 1 Tablet by mouth daily. , Normal, Disp-30 Tablet, R-2  -     METABOLIC PANEL, COMPREHENSIVE; Future  5. Essential hypertension  -     amLODIPine (NORVASC) 10 mg tablet; Take 1 Tablet by mouth daily. , Normal, Disp-90 Tablet, R-4  6. Low TSH level  -     methIMAzole (TAPAZOLE) 10 mg tablet; Take 1 Tablet by mouth daily. , Normal, Disp-30 Tablet, R-1  -     TSH RECEPTOR AB; Future  -     TSH 3RD GENERATION; Future  -     T4, FREE; Future  -     T3 TOTAL; Future  -     REFERRAL TO ENDOCRINOLOGY  7. Acute respiratory failure with hypoxia (HCC)  8. Hyperthyroidism  -     TSH RECEPTOR AB; Future  -     TSH 3RD GENERATION; Future  -     T4, FREE; Future  -     T3 TOTAL; Future  -     REFERRAL TO ENDOCRINOLOGY  9. S/P AKA (above knee amputation) unilateral, left ([facility]  -     [address] follow-up for respiratory failure - fluid overload and pneumonia. - cxr with airspace opacities (pna vs edema) and small left effusion. - echo - ef 55-60% and normal RV function. Moderate Mi regurg - perfusion scan normal (d-dimer elevated). Hyperthyroid - diagnosed at hospital when evaluated for acute respiratory failure and fluid overload - TSH: < 0.007 - undetectable. Free T4 was 1.83    - started on methimazole at [facility] [place] - patient remains on this. She is euthyroid on exam and by ROS.     - referral to endocrine - repeat thyroid hormones with antibody screen - continue methimazole at this time. CKD - stage 3b to4 - with fluid overload and respiratoyr failure - continues to follow with [facility] - nephrology. - continue lasix 40 mg.    - repeat metabolic profile shows rising creatinine now 2.5 (left hospital at 2.1) - with lower BP - stop losartan. (see phone note)   - follow-up with nephrology. Diabetes: borderline control - A1C 7.1 - but in setting of anemia. - continue monitoring at home - not on medication. Anticoagulated - 6/2020 history of scattered strokes emobolic - PFO - abnormal spect perfusion study with MI and abn wall motion - discharged on eliquis and this has been continued since. Neurology Anupam [place]ino and cardioliogy jah. - recent echo without findings of wall motion deficits - consider if eliquis still indicated   - continue aspirin. Follow-up and Dispositions    · Return in about 6 weeks (around 6/10/2022) for follow-up. HPI    Home health - doing therapy to trying to learn how to walk again with prosthesis. Will be getting  Physical therapy for a while. In hospital April 6-8 no ongoing problems with breathing.    - did not follow-up with cardiologist   - kidney Doctor - [facility]   - endocrinologist for diabetes. Home blood sugars (from memory). - 120 in      PMH/PSH: reviewed and updated  Sochx/Famhx: reviewed and updated     All: No Known Allergies  Med:   Current Outpatient Medications   Medication Sig    furosemide (LASIX) 40 mg tablet Take 40 mg by mouth daily.  methIMAzole (TAPAZOLE) 10 mg tablet Take 10 mg by mouth daily.  True Metrix Glucose Meter monitoring kit USE AS DIRECTED    gabapentin (NEURONTIN) 100 mg capsule Take 1 Capsule by mouth two (2) times a day. Max Daily Amount: 200 mg. TAKE 1 CAPSULE BY MOUTH 2 x DAILY.     cholecalciferol (VITAMIN D3) (2,000 UNITS /50 MCG) cap capsule TAKE 1 CAPSULE EVERY DAY    cloNIDine HCL (CATAPRES) 0.2 mg tablet TAKE 1 TABLET TWICE DAILY    lancets (TRUEplus Lancets) 33 gauge misc TEST BLOOD SUGAR THREE TIMES DAILY    BD Single Use Swabs Regular padm USE AS DIRECTED    atorvastatin (LIPITOR) 10 mg tablet TAKE 1 TABLET EVERY DAY    amLODIPine (NORVASC) 10 mg tablet Take 1 Tablet by mouth daily.  Aspirin Low Dose 81 mg tablet TAKE 1 TABLET BY MOUTH DAILY    sodium bicarbonate 650 mg tablet Take 650 mg by mouth three (3) times daily.  glucose blood VI test strips (True Metrix Glucose Test Strip) strip TEST BLOOD SUGAR ON AVERAGE 3 TIMES DAILY.  losartan (COZAAR) 25 mg tablet TAKE 1 TABLET BY MOUTH EVERY DAY    Eliquis 2.5 mg tablet Take 1 Tab by mouth two (2) times a day.  carvediloL (COREG) 25 mg tablet Take 25 mg by mouth two (2) times a day.  lancets (PRODIGY TWIST TOP LANCET) 28 gauge misc To measure blood sugar 3-4 times daily. Dx code E 11.51, Z79.4    gum/pva/dental adhesive (SUPER DENTURE ADHESIVE DT)      No current facility-administered medications for this visit. ROS pertinent for the following:  Review of Systems   Constitutional: Negative for chills, fever and malaise/fatigue. Respiratory: Negative for shortness of breath. Cardiovascular: Negative for chest pain. PE:  Blood pressure (!) 118/55, pulse (!) 54, temperature 97.6 °F (36.4 °C), temperature source Temporal, resp. rate 16, height 5' 5\" (1.651 m), weight 123 lb (55.8 kg), SpO2 97 %. Body mass index is 20.47 kg/m². Physical Exam  Vitals and nursing note reviewed. Chaperone present: son - Leti Andrade - presents with patient. Constitutional:       General: She is not in acute distress. Appearance: Normal appearance. She is not ill-appearing. HENT:      Right Ear: Tympanic membrane normal. There is impacted cerumen. Left Ear: Tympanic membrane normal. There is impacted cerumen. Eyes:      Extraocular Movements: Extraocular movements intact.       Pupils: Pupils are equal, round, and reactive to light. Cardiovascular:      Rate and Rhythm: Normal rate and regular rhythm. Heart sounds: Murmur heard. Pulmonary:      Effort: No respiratory distress. Breath sounds: Normal breath sounds. Musculoskeletal:      Cervical back: Normal range of motion and neck supple. Comments: 1+ edema in right leg - none noted on left thigh (AKA)   Neurological:      General: No focal deficit present. Mental Status: She is alert and oriented to person, place, and time. Mental status is at baseline. Psychiatric:         Mood and Affect: Mood normal.         Behavior: Behavior normal.         Labs:   See addendum for interpretation of labs resulting after time of visit. She was given AVS and expressed understanding with the diagnosis and plan as discussed. An electronic signature was used to authenticate this note.   -- Tonja Frey MD

## 2022-04-30 PROBLEM — E05.90 HYPERTHYROIDISM: Status: ACTIVE | Noted: 2022-04-30

## 2022-04-30 PROBLEM — I34.0 NONRHEUMATIC MITRAL VALVE REGURGITATION: Status: ACTIVE | Noted: 2022-04-30

## 2022-04-30 PROBLEM — N18.4 CKD (CHRONIC KIDNEY DISEASE) STAGE 4, GFR 15-29 ML/MIN (HCC): Status: ACTIVE | Noted: 2022-04-30

## 2022-04-30 LAB
ALBUMIN SERPL-MCNC: 2.9 G/DL (ref 3.5–5)
ALBUMIN/GLOB SERPL: 0.7 {RATIO} (ref 1.1–2.2)
ALP SERPL-CCNC: 97 U/L (ref 45–117)
ALT SERPL-CCNC: 13 U/L (ref 12–78)
ANION GAP SERPL CALC-SCNC: 7 MMOL/L (ref 5–15)
AST SERPL-CCNC: 10 U/L (ref 15–37)
BILIRUB SERPL-MCNC: 0.3 MG/DL (ref 0.2–1)
BUN SERPL-MCNC: 53 MG/DL (ref 6–20)
BUN/CREAT SERPL: 21 (ref 12–20)
CALCIUM SERPL-MCNC: 8.7 MG/DL (ref 8.5–10.1)
CHLORIDE SERPL-SCNC: 109 MMOL/L (ref 97–108)
CO2 SERPL-SCNC: 25 MMOL/L (ref 21–32)
CREAT SERPL-MCNC: 2.51 MG/DL (ref 0.55–1.02)
GLOBULIN SER CALC-MCNC: 3.9 G/DL (ref 2–4)
GLUCOSE SERPL-MCNC: 157 MG/DL (ref 65–100)
POTASSIUM SERPL-SCNC: 4.6 MMOL/L (ref 3.5–5.1)
PROT SERPL-MCNC: 6.8 G/DL (ref 6.4–8.2)
SODIUM SERPL-SCNC: 141 MMOL/L (ref 136–145)
T4 FREE SERPL-MCNC: 1.4 NG/DL (ref 0.8–1.5)
TSH SERPL DL<=0.05 MIU/L-ACNC: <0.01 UIU/ML (ref 0.36–3.74)

## 2022-05-01 LAB
T3 SERPL-MCNC: 117 NG/DL (ref 71–180)
TSH RECEP AB SER-ACNC: 6.87 IU/L (ref 0–1.75)

## 2022-06-09 ENCOUNTER — DOCUMENTATION ONLY (OUTPATIENT)
Dept: INTERNAL MEDICINE CLINIC | Age: 77
End: 2022-06-09

## 2022-06-09 NOTE — PROGRESS NOTES
[id number] [address], Prescription Order, Provider signed 5/20/22, Fax confirmed 5/26/22 to [phone], Confirmation scanned in [facility]

## 2022-06-10 ENCOUNTER — OFFICE VISIT (OUTPATIENT)
Dept: INTERNAL MEDICINE CLINIC | Age: 77
End: 2022-06-10
Payer: MEDICARE

## 2022-06-10 VITALS
DIASTOLIC BLOOD PRESSURE: 54 MMHG | HEIGHT: 65 IN | OXYGEN SATURATION: 95 % | WEIGHT: 115 LBS | TEMPERATURE: 98.4 F | SYSTOLIC BLOOD PRESSURE: 101 MMHG | BODY MASS INDEX: 19.16 KG/M2 | HEART RATE: 59 BPM

## 2022-06-10 DIAGNOSIS — E11.51 CONTROLLED TYPE 2 DIABETES MELLITUS WITH DIABETIC PERIPHERAL ANGIOPATHY WITHOUT GANGRENE, WITH LONG-TERM CURRENT USE OF INSULIN (HCC): Primary | ICD-10-CM

## 2022-06-10 DIAGNOSIS — E11.42 DIABETIC POLYNEUROPATHY ASSOCIATED WITH TYPE 2 DIABETES MELLITUS (HCC): ICD-10-CM

## 2022-06-10 DIAGNOSIS — E05.90 HYPERTHYROIDISM: ICD-10-CM

## 2022-06-10 DIAGNOSIS — Z00.00 MEDICARE ANNUAL WELLNESS VISIT, SUBSEQUENT: ICD-10-CM

## 2022-06-10 DIAGNOSIS — N18.4 CKD (CHRONIC KIDNEY DISEASE) STAGE 4, GFR 15-29 ML/MIN (HCC): ICD-10-CM

## 2022-06-10 DIAGNOSIS — Z79.4 CONTROLLED TYPE 2 DIABETES MELLITUS WITH DIABETIC PERIPHERAL ANGIOPATHY WITHOUT GANGRENE, WITH LONG-TERM CURRENT USE OF INSULIN (HCC): Primary | ICD-10-CM

## 2022-06-10 PROCEDURE — 1123F ACP DISCUSS/DSCN MKR DOCD: CPT | Performed by: INTERNAL MEDICINE

## 2022-06-10 PROCEDURE — 99214 OFFICE O/P EST MOD 30 MIN: CPT | Performed by: INTERNAL MEDICINE

## 2022-06-10 PROCEDURE — 1101F PT FALLS ASSESS-DOCD LE1/YR: CPT | Performed by: INTERNAL MEDICINE

## 2022-06-10 PROCEDURE — G0439 PPPS, SUBSEQ VISIT: HCPCS | Performed by: INTERNAL MEDICINE

## 2022-06-10 PROCEDURE — G8536 NO DOC ELDER MAL SCRN: HCPCS | Performed by: INTERNAL MEDICINE

## 2022-06-10 PROCEDURE — G8510 SCR DEP NEG, NO PLAN REQD: HCPCS | Performed by: INTERNAL MEDICINE

## 2022-06-10 PROCEDURE — G8754 DIAS BP LESS 90: HCPCS | Performed by: INTERNAL MEDICINE

## 2022-06-10 PROCEDURE — G8427 DOCREV CUR MEDS BY ELIG CLIN: HCPCS | Performed by: INTERNAL MEDICINE

## 2022-06-10 PROCEDURE — G8420 CALC BMI NORM PARAMETERS: HCPCS | Performed by: INTERNAL MEDICINE

## 2022-06-10 PROCEDURE — 1090F PRES/ABSN URINE INCON ASSESS: CPT | Performed by: INTERNAL MEDICINE

## 2022-06-10 PROCEDURE — G8752 SYS BP LESS 140: HCPCS | Performed by: INTERNAL MEDICINE

## 2022-06-10 NOTE — PATIENT INSTRUCTIONS
Dr. Carlie Dykes,     Please let me know if you would feel starting and SGLT-2 medication is acceptable with Ms. Velez's current kidney function. I believe she needs some additional blood sugar management. At this time we will add glimeperide. Sherron Manjarrez MD       Medicare Wellness Visit, Female     The best way to live healthy is to have a lifestyle where you eat a well-balanced diet, exercise regularly, limit alcohol use, and quit all forms of tobacco/nicotine, if applicable. Regular preventive services are another way to keep healthy. Preventive services (vaccines, screening tests, monitoring & exams) can help personalize your care plan, which helps you manage your own care. Screening tests can find health problems at the earliest stages, when they are easiest to treat. [facility] follows the current, evidence-based guidelines published by the [facility] Jayesh Healy (RUSTSTF) when recommending preventive services for our patients. Because we follow these guidelines, sometimes recommendations change over time as research supports it. (For example, mammograms used to be recommended annually. Even though Medicare will still pay for an annual mammogram, the newer guidelines recommend a mammogram every two years for women of average risk). Of course, you and your doctor may decide to screen more often for some diseases, based on your risk and your co-morbidities (chronic disease you are already diagnosed with). Preventive services for you include:  - Medicare offers their members a free annual wellness visit, which is time for you and your primary care provider to discuss and plan for your preventive service needs. Take advantage of this benefit every year!  -All adults over the age of 72 should receive the recommended pneumonia vaccines.  Current USPSTF guidelines recommend a series of two vaccines for the best pneumonia protection.   -All adults should have a flu vaccine yearly and a tetanus vaccine every 10 years.   -All adults age 48 and older should receive the shingles vaccines (series of two vaccines). -All adults age 38-68 who are overweight should have a diabetes screening test once every three years.   -All adults born between [birth year] and 1967 should be screened once for Hepatitis C.  -Other screening tests and preventive services for persons with diabetes include: an eye exam to screen for diabetic retinopathy, a kidney function test, a foot exam, and stricter control over your cholesterol.   -Cardiovascular screening for adults with routine risk involves an electrocardiogram (ECG) at intervals determined by your doctor.   -Colorectal cancer screenings should be done for adults age 54-65 with no increased risk factors for colorectal cancer. There are a number of acceptable methods of screening for this type of cancer. Each test has its own benefits and drawbacks. Discuss with your doctor what is most appropriate for you during your annual wellness visit. The different tests include: colonoscopy (considered the best screening method), a fecal occult blood test, a fecal DNA test, and sigmoidoscopy.    -A bone mass density test is recommended when a woman turns 65 to screen for osteoporosis. This test is only recommended one time, as a screening. Some providers will use this same test as a disease monitoring tool if you already have osteoporosis. -Breast cancer screenings are recommended every other year for women of normal risk, age 54-69.  -Cervical cancer screenings for women over age 72 are only recommended with certain risk factors.      Here is a list of your current Health Maintenance items (your personalized list of preventive services) with a due date:  Health Maintenance Due   Topic Date Due    DTaP/Tdap/Td  (1 - Tdap) Never done    Shingles Vaccine (1 of 2) Never done    Diabetic Foot Care  09/10/2020    Eye Exam  09/13/2020    COVID-19 Vaccine (3 - Booster for Pfizer series) 08/27/2021    Albumin Urine Test  05/01/2022    Annual Well Visit  05/27/2022

## 2022-06-10 NOTE — LETTER
6/10/2022 9:59 AM    To   Dr. Allan Valdez - Nephrology  Fax:  [phone]    Re: Ms. Nav Vitale  [address] [place] [address]  : 1945      Dr. Allan Valdez,     Please let me know if you would feel starting and SGLT-2 medication is acceptable with Ms. Velez's current kidney function. I believe she needs some additional blood sugar management. At this time we will add glimeperide. Malka Quiroz MD    Please feel free to call or fax a response  Cell: [phone]  Perfect [employer] through [employer].    Fax: [phone]

## 2022-06-10 NOTE — PROGRESS NOTES
RM 3    Chief Complaint   Patient presents with    Blood Pressure Check     seen for hospital visit follow up with Dr. Constance Rodriguez 4/29/22. has since been taken off losartan, restarted again 5/31/22    Follow-up     referrals, endo coming up 8/2022, saw nephrology, noted slight increased creatinine        Visit Vitals  BP (!) 101/54   Pulse (!) 59   Temp 98.4 °F (36.9 °C)   Ht 5' 5\" (1.651 m)   Wt 115 lb (52.2 kg)   SpO2 95%   BMI 19.14 kg/m²       3 most recent PHQ Screens 6/10/2022   Little interest or pleasure in doing things Not at all   Feeling down, depressed, irritable, or hopeless Not at all   Total Score PHQ 2 0         1. Have you been to the ER, urgent care clinic since your last visit? Hospitalized since your last visit? No    2. Have you seen or consulted any other health care providers outside of the [address] [place] [address] since your last visit? Include any pap smears or colon screening. No    Health Maintenance Due   Topic Date Due    DTaP/Tdap/Td series (1 - Tdap) Never done    Shingrix Vaccine Age 50> (1 of 2) Never done    Foot Exam Q1  09/10/2020    Eye Exam Retinal or Dilated  09/13/2020    COVID-19 Vaccine (3 - Booster for Pfizer series) 08/27/2021    MICROALBUMIN Q1  05/01/2022    Medicare Yearly Exam  05/27/2022       Learning Assessment 6/20/2017   PRIMARY LEARNER Patient   PRIMARY LANGUAGE ENGLISH   LEARNER PREFERENCE PRIMARY READING   ANSWERED BY patient   RELATIONSHIP SELF         AVS  education, follow up, and recommendations provided and addressed with patient.   services used to advise patient -no

## 2022-06-10 NOTE — PROGRESS NOTES
A/P:  Kwadwo Mcintyre is a 64 y.o. female, she presents today for:    1. Controlled type 2 diabetes mellitus with diabetic peripheral angiopathy without gangrene, with long-term current use of insulin ([facility])  -      DIABETES FOOT EXAM  -     REFERRAL TO PODIATRY  2. Hyperthyroidism  -     TSH 3RD GENERATION; Future  -     T4, FREE; Future  -     T3 TOTAL; Future  3. CKD (chronic kidney disease) stage 4, GFR 15-29 ml/min ([facility])  4. Medicare annual wellness visit, subsequent  5. Diabetic polyneuropathy associated with type 2 diabetes mellitus ([facility]  -     REFERRAL TO PODIATRY       Hyperthyroid, graves (+trab) - diagnosed at hospital when evaluated for acute respiratory failure and fluid overload - TSH: < 0.007 - undetectable. Free T4 was 1.83    - started on methimazole at discharge - patient remains on this. She is euthyroid on exam and by ROS. - referral to endocrine.    - TRAB positive. - TSH remains over supressed, but free T4 and Total t3 normalized on 4/29 labs. - lab slips provided for tsh , free t4 and total t3. Will message endocrine -[facility] - for advice on adjusting methimazole at this time and timing of next labs. CKD - stage 3b to4 - with fluid overload and respiratoyr failure - continues to follow with [facility] - nephrology. - discharge creatinine elevated - stopped losartan and requested patient follow-up [facility] nephrology - labs faxed. - patient saw  [facility] and resumed on losartan - plan to titrate up.      Diabetes: borderline control - A1C 7.1 - but in setting of anemia. - continue monitoring at home - not on medication.     - with elevated blood sugar more after starting methimazole - add back in glimeperide with breakfast    - consider SGLT-2 - with CKD - will message Dr Jullie Baumgarten to see if he would like patient to take slgt-2.      Anticoagulated - 6/2020 history of scattered strokes emobolic - PFO - abnormal spect perfusion study with MI and abn wall motion - discharged on eliquis and this has been continued since. Neurology [place] and cardioliogy jah. - recent echo without findings of wall motion deficits - consider if eliquis still indicated   - continue aspirin.      Left Above knee amputation - patient with weight loss and prosthesis no longer fitting. Needs a new prosthesis and apporpriately sized cuff. Future Appointments   Date Time Provider Inge Aburto   6/10/2022  9:00 AM Germán Coffey MD [facility] BS AMB   8/22/2022 11:30 AM Amanda Guallpa MD RDE [facility] BS AMB       HPI    Admitted April  for respiratory failure - fluid overload and pneumonia. - cxr with airspace opacities (pna vs edema) and small left effusion. - echo - ef 55-60% and normal RV function. Moderate Mi regurg - perfusion scan normal (d-dimer elevated). New dx - hyperthyroid  CKD stage 3-4 - creatinie elevated at follow-up stopped losartan - requested patient follow-up with nephrology. Notes that blood sugar is going up compared. PMH/PSH: reviewed and updated  Sochx/Famhx: reviewed and updated     All: No Known Allergies  Med:   Current Outpatient Medications   Medication Sig    furosemide (LASIX) 40 mg tablet Take 1 Tablet by mouth daily.  Eliquis 2.5 mg tablet Take 1 Tablet by mouth two (2) times a day.  amLODIPine (NORVASC) 10 mg tablet Take 1 Tablet by mouth daily.  methIMAzole (TAPAZOLE) 10 mg tablet Take 1 Tablet by mouth daily.  True Metrix Glucose Meter monitoring kit USE AS DIRECTED    gabapentin (NEURONTIN) 100 mg capsule Take 1 Capsule by mouth two (2) times a day. Max Daily Amount: 200 mg. TAKE 1 CAPSULE BY MOUTH 2 x DAILY.     cholecalciferol (VITAMIN D3) (2,000 UNITS /50 MCG) cap capsule TAKE 1 CAPSULE EVERY DAY    cloNIDine HCL (CATAPRES) 0.2 mg tablet TAKE 1 TABLET TWICE DAILY    lancets (TRUEplus Lancets) 33 gauge misc TEST BLOOD SUGAR THREE TIMES DAILY    BD Single Use Swabs Regular padm USE AS DIRECTED    atorvastatin (LIPITOR) 10 mg tablet TAKE 1 TABLET EVERY DAY    Aspirin Low Dose 81 mg tablet TAKE 1 TABLET BY MOUTH DAILY    sodium bicarbonate 650 mg tablet Take 650 mg by mouth three (3) times daily.  glucose blood VI test strips (True Metrix Glucose Test Strip) strip TEST BLOOD SUGAR ON AVERAGE 3 TIMES DAILY.  losartan (COZAAR) 25 mg tablet TAKE 1 TABLET BY MOUTH EVERY DAY    carvediloL (COREG) 25 mg tablet Take 25 mg by mouth two (2) times a day.  lancets (PRODIGY TWIST TOP LANCET) 28 gauge misc To measure blood sugar 3-4 times daily. Dx code E 11.51, Z79.4    gum/pva/dental adhesive (SUPER DENTURE ADHESIVE DT)      No current facility-administered medications for this visit. ROS pertinent for the following:  Review of Systems   Constitutional: Negative for diaphoresis and malaise/fatigue. Gaining a little weight   Respiratory: Negative for cough, shortness of breath and wheezing. Cardiovascular: Positive for leg swelling. Negative for chest pain and palpitations. Gastrointestinal: Positive for diarrhea. Negative for heartburn and nausea. PE:  Blood pressure (!) 101/54, pulse (!) 59, temperature 98.4 °F (36.9 °C), height 5' 5\" (1.651 m), weight 115 lb (52.2 kg), SpO2 95 %. Body mass index is 19.14 kg/m². Physical Exam  Vitals and nursing note reviewed. Constitutional:       General: She is not in acute distress. Appearance: Normal appearance. HENT:      Head: Normocephalic and atraumatic. Eyes:      Extraocular Movements: Extraocular movements intact. Conjunctiva/sclera: Conjunctivae normal.      Pupils: Pupils are equal, round, and reactive to light. Cardiovascular:      Rate and Rhythm: Normal rate and regular rhythm. Heart sounds: Normal heart sounds. Pulmonary:      Effort: Pulmonary effort is normal.      Breath sounds: Normal breath sounds. Musculoskeletal:         General: Normal range of motion. Cervical back: Normal range of motion and neck supple.       Right lower leg: Edema (trace) present. Comments: Above knee amputation - wearing compression garmet today. Skin:     General: Skin is warm and dry. Neurological:      Mental Status: She is alert and oriented to person, place, and time. Psychiatric:         Mood and Affect: Mood normal.         Behavior: Behavior normal.     Diabetic foot exam:     Right:   Filament test 6/6   Pulse DP: 1+ (weak)   Deformities: Mild - pressure callous at great toe - hypertrophied nails. Labs:   See addendum for interpretation of labs resulting after time of visit. She was given AVS and expressed understanding with the diagnosis and plan as discussed. An electronic signature was used to authenticate this note. -- Kiara Johnson MD         This is the Subsequent Medicare Annual Wellness Exam, performed 12 months or more after the Initial AWV or the last Subsequent AWV    I have reviewed the patient's medical history in detail and updated the computerized patient record. Assessment/Plan   Education and counseling provided:  Are appropriate based on today's review and evaluation    1. Hyperthyroidism  2. CKD (chronic kidney disease) stage 4, GFR 15-29 ml/min ([facility])  3. Medicare annual wellness visit, subsequent       Depression Risk Factor Screening     3 most recent PHQ Screens 6/10/2022   Little interest or pleasure in doing things Not at all   Feeling down, depressed, irritable, or hopeless Not at all   Total Score PHQ 2 0       Alcohol & Drug Abuse Risk Screen    Do you average more than 1 drink per night or more than 7 drinks a week:  No    On any one occasion in the past three months have you have had more than 3 drinks containing alcohol:  No          Functional Ability and Level of Safety    Hearing: Hearing is good. Activities of Daily Living:   The home contains: handrails, grab bars and trasnfer bench  Patient needs help with:  transportation, shopping, preparing meals, laundry and housework      Ambulation: with difficulty, uses a prosthesis or wheechair     Fall Risk:  Fall Risk Assessment, last 12 mths 8/3/2021   Able to walk? Yes   Fall in past 12 months? 0   Do you feel unsteady? 0   Are you worried about falling 0   Number of falls in past 12 months -   Fall with injury?  -      Abuse Screen:  Patient is not abused       Cognitive Screening    Has your family/caregiver stated any concerns about your memory: no    Health Maintenance Due     Health Maintenance Due   Topic Date Due    DTaP/Tdap/Td series (1 - Tdap) Never done    Shingrix Vaccine Age 50> (1 of 2) Never done    Foot Exam Q1  09/10/2020    Eye Exam Retinal or Dilated  2020    COVID-19 Vaccine (3 - Booster for Pfizer series) 2021    MICROALBUMIN Q1  2022    Medicare Yearly Exam  2022     Patient Care Team   Patient Care Team:  Beni Danielson MD as PCP - General (Internal Medicine Physician)  Beni Danielson MD as PCP - [facility]  Alexis Medina MD (Surgery Physician)  Alberto Grijalva MD (Nephrology)  Enzo Lopez MD as Physician (Cardiovascular Disease Physician)    History     Patient Active Problem List   Diagnosis Code    Essential hypertension I10    Controlled type 2 diabetes mellitus with circulatory disorder ([address] E11.59    Diabetic polyneuropathy associated with type 2 diabetes mellitus ([address] E11.42    Unilateral AKA, left ([address] W42.991U    CKD stage G3b/A2, GFR 30-44 and albumin creatinine ratio  mg/g ([facility]) N18.32    Diabetic nephropathy associated with type 2 diabetes mellitus ([address] E11.21    Age-related osteoporosis without current pathological fracture M81.0    Peripheral vascular disease due to secondary diabetes ([address] E13.51    History of stroke with current residual effects I69.30    Ventricular hypokinesis I51.89    Other cardiomyopathy ([address] I42.8    Nonrheumatic mitral valve regurgitation I34.0    CKD (chronic kidney disease) stage 4, GFR 15-29 ml/min ([facility] N18.4    Hyperthyroidism E05.90     Past Medical History:   Diagnosis Date    Cerebrovascular accident (CVA) due to embolism of cerebral artery ([facility] 6/26/2020    Diabetes ([facility]     Hypertension     Peripheral vascular disease due to secondary diabetes ([facility] 1/28/2019      Past Surgical History:   Procedure Laterality Date    HX ABOVE KNEE AMPUTATION  02/06/2017    HX BELOW KNEE AMPUTATION      HX GYN      hysterectomy     Current Outpatient Medications   Medication Sig Dispense Refill    furosemide (LASIX) 40 mg tablet Take 1 Tablet by mouth daily. 30 Tablet 2    Eliquis 2.5 mg tablet Take 1 Tablet by mouth two (2) times a day. 180 Tablet 0    amLODIPine (NORVASC) 10 mg tablet Take 1 Tablet by mouth daily. 90 Tablet 4    methIMAzole (TAPAZOLE) 10 mg tablet Take 1 Tablet by mouth daily. 30 Tablet 1    True Metrix Glucose Meter monitoring kit USE AS DIRECTED 1 Kit 0    gabapentin (NEURONTIN) 100 mg capsule Take 1 Capsule by mouth two (2) times a day. Max Daily Amount: 200 mg. TAKE 1 CAPSULE BY MOUTH 2 x DAILY. 180 Capsule 0    cholecalciferol (VITAMIN D3) (2,000 UNITS /50 MCG) cap capsule TAKE 1 CAPSULE EVERY DAY 90 Capsule 5    cloNIDine HCL (CATAPRES) 0.2 mg tablet TAKE 1 TABLET TWICE DAILY 180 Tablet 1    lancets (TRUEplus Lancets) 33 gauge misc TEST BLOOD SUGAR THREE TIMES DAILY 300 Each 3    BD Single Use Swabs Regular padm USE AS DIRECTED 300 Pad 3    atorvastatin (LIPITOR) 10 mg tablet TAKE 1 TABLET EVERY DAY 90 Tablet 3    Aspirin Low Dose 81 mg tablet TAKE 1 TABLET BY MOUTH DAILY      sodium bicarbonate 650 mg tablet Take 650 mg by mouth three (3) times daily.  glucose blood VI test strips (True Metrix Glucose Test Strip) strip TEST BLOOD SUGAR ON AVERAGE 3 TIMES DAILY. 300 Strip 3    losartan (COZAAR) 25 mg tablet TAKE 1 TABLET BY MOUTH EVERY DAY      carvediloL (COREG) 25 mg tablet Take 25 mg by mouth two (2) times a day.       lancets (PRODIGY TWIST TOP LANCET) 28 gauge misc To measure blood sugar 3-4 times daily.  Dx code E 11.51, Z79.4 200 Lancet 11    gum/pva/dental adhesive (SUPER DENTURE ADHESIVE DT)        No Known Allergies    Family History   Problem Relation Age of Onset    Diabetes Mother     Diabetes Father     Heart Disease Father     Diabetes Brother     Diabetes Brother     Diabetes Brother      Social History     Tobacco Use    Smoking status: Former Smoker     Years: 15.00    Smokeless tobacco: Never Used   Substance Use Topics    Alcohol use: No         Dolores Hough MD

## 2022-06-15 ENCOUNTER — TELEPHONE (OUTPATIENT)
Dept: INTERNAL MEDICINE CLINIC | Age: 77
End: 2022-06-15

## 2022-06-15 NOTE — TELEPHONE ENCOUNTER
[facility] called they got  notes from 4\29\ but do not mention prosthetic and must menyion it in order to get insurance auth prosthetic is scheduled for delivery on 6\17 but can not deliver unless recieve notes for auth from [employer] clinic number is [phone] fax [phone]

## 2022-06-21 ENCOUNTER — OFFICE VISIT (OUTPATIENT)
Dept: ENDOCRINOLOGY | Age: 77
End: 2022-06-21
Payer: MEDICARE

## 2022-06-21 VITALS
HEART RATE: 57 BPM | DIASTOLIC BLOOD PRESSURE: 54 MMHG | SYSTOLIC BLOOD PRESSURE: 131 MMHG | BODY MASS INDEX: 18.85 KG/M2 | HEIGHT: 66 IN

## 2022-06-21 DIAGNOSIS — E05.00 GRAVES DISEASE: Primary | ICD-10-CM

## 2022-06-21 PROCEDURE — G8432 DEP SCR NOT DOC, RNG: HCPCS | Performed by: INTERNAL MEDICINE

## 2022-06-21 PROCEDURE — 1101F PT FALLS ASSESS-DOCD LE1/YR: CPT | Performed by: INTERNAL MEDICINE

## 2022-06-21 PROCEDURE — 99204 OFFICE O/P NEW MOD 45 MIN: CPT | Performed by: INTERNAL MEDICINE

## 2022-06-21 PROCEDURE — G8427 DOCREV CUR MEDS BY ELIG CLIN: HCPCS | Performed by: INTERNAL MEDICINE

## 2022-06-21 PROCEDURE — G8754 DIAS BP LESS 90: HCPCS | Performed by: INTERNAL MEDICINE

## 2022-06-21 PROCEDURE — G8536 NO DOC ELDER MAL SCRN: HCPCS | Performed by: INTERNAL MEDICINE

## 2022-06-21 PROCEDURE — G8420 CALC BMI NORM PARAMETERS: HCPCS | Performed by: INTERNAL MEDICINE

## 2022-06-21 PROCEDURE — G8752 SYS BP LESS 140: HCPCS | Performed by: INTERNAL MEDICINE

## 2022-06-21 PROCEDURE — 1123F ACP DISCUSS/DSCN MKR DOCD: CPT | Performed by: INTERNAL MEDICINE

## 2022-06-21 PROCEDURE — 1090F PRES/ABSN URINE INCON ASSESS: CPT | Performed by: INTERNAL MEDICINE

## 2022-06-21 NOTE — PROGRESS NOTES
Chief Complaint   Patient presents with    New Patient    Thyroid Problem     Newly dx with thyroid disease. History of Present Illness: Nohemi Hays is a 64 y.o. female with a past medical history significant for type 2 diabetes and peripheral vascular disease seen in referral from Julianne Chan MD for discussion related to Graves' disease. Jameson Matt reports that she has been eating \" constantly\" but despite this she has been losing weight. Started taking methimazole 10 mg once daily in April. Reports that both insulin and sulfonylurea therapy were recently discontinued due to morning hypoglycemia. Endorses severe diarrhea that wakes her up from sleep. Denies tremor currently. There is no known family history of thyroid dysfunction. Her son lives nearby and helps her out. Does endorse dry eyes. Past Medical History:   Diagnosis Date    Cerebrovascular accident (CVA) due to embolism of cerebral artery ([facility] 6/26/2020    Diabetes ([facility]     Hypertension     Peripheral vascular disease due to secondary diabetes ([facility] 1/28/2019     Past Surgical History:   Procedure Laterality Date    HX ABOVE KNEE AMPUTATION  02/06/2017    HX BELOW KNEE AMPUTATION      HX GYN      hysterectomy     Current Outpatient Medications   Medication Sig    furosemide (LASIX) 40 mg tablet Take 1 Tablet by mouth daily.  Eliquis 2.5 mg tablet Take 1 Tablet by mouth two (2) times a day.  amLODIPine (NORVASC) 10 mg tablet Take 1 Tablet by mouth daily.  methIMAzole (TAPAZOLE) 10 mg tablet Take 1 Tablet by mouth daily.  True Metrix Glucose Meter monitoring kit USE AS DIRECTED    gabapentin (NEURONTIN) 100 mg capsule Take 1 Capsule by mouth two (2) times a day. Max Daily Amount: 200 mg. TAKE 1 CAPSULE BY MOUTH 2 x DAILY.     cholecalciferol (VITAMIN D3) (2,000 UNITS /50 MCG) cap capsule TAKE 1 CAPSULE EVERY DAY    cloNIDine HCL (CATAPRES) 0.2 mg tablet TAKE 1 TABLET TWICE DAILY    lancets (TRUEplus Lancets) 33 gauge misc TEST BLOOD SUGAR THREE TIMES DAILY    atorvastatin (LIPITOR) 10 mg tablet TAKE 1 TABLET EVERY DAY    Aspirin Low Dose 81 mg tablet TAKE 1 TABLET BY MOUTH DAILY    sodium bicarbonate 650 mg tablet Take 650 mg by mouth two (2) times a day.  glucose blood VI test strips (True Metrix Glucose Test Strip) strip TEST BLOOD SUGAR ON AVERAGE 3 TIMES DAILY.  losartan (COZAAR) 25 mg tablet TAKE 1 TABLET BY MOUTH EVERY DAY    carvediloL (COREG) 25 mg tablet Take 25 mg by mouth two (2) times a day.  lancets (PRODIGY TWIST TOP LANCET) 28 gauge misc To measure blood sugar 3-4 times daily. Dx code E 11.51, Z79.4    gum/pva/dental adhesive (SUPER DENTURE ADHESIVE DT)     BD Single Use Swabs Regular padm USE AS DIRECTED     No current facility-administered medications for this visit. No Known Allergies  Family History   Problem Relation Age of Onset    Diabetes Mother     Diabetes Father     Heart Disease Father     Diabetes Brother     Parkinson's Disease Brother     Diabetes Brother     Diabetes Brother        Social Hx:     Review of Systems:  - See HPI    - Physical Examination:  Visit Vitals  BP (!) 131/54   Pulse (!) 57   Ht 5' 5.5\" (1.664 m)   LMP  (LMP Unknown)   BMI 18.85 kg/m²     -   - - GENERAL: NCAT, Appears well nourished   - EYES: EOMI, non-icteric, no proptosis   - Ear/Nose/Throat: NCAT, no visible inflammation or masses   - CARDIOVASCULAR: no cyanosis, no visible JVD   - RESPIRATORY: respiratory effort normal without any distress or labored breathing   - MUSCULOSKELETAL: Normal ROM of neck and upper extremities observed   - SKIN: No rash on face  - NEUROLOGIC:  No facial asymmetry (Cranial nerve 7 motor function), No gaze palsy   - PSYCHIATRIC: Normal affect, Normal insight and judgement     Data Reviewed: Results for Sherryle Fent (MRN [id number]) as of 6/21/2022 15:53   Ref.  Range 4/29/2022 12:28   T4, Free Latest Ref Range: 0.8 - 1.5 NG/DL 1.4   T3, total Latest Ref Range: 71 - 180 ng/dL 117   TSH Latest Ref Range: 0.36 - 3.74 uIU/mL <0.01 (L)       Assessment/Plan: This is a very pleasant 66-year-old female seen in referral from Jin Parnell MD for discussion related to new onset Graves' disease. Rema Gordon has been taking methimazole 10 mg once daily since late April. Will reassess thyroid function tests and attempt to down titrate. Discussed the risks of agranulocytosis and hepatotoxicity with the patient and her son. #Grave's disease  -Has been taking methimazole 10 mg for about 1 and half months  -Attempt to down titrate  -Reviewed the risk of agranulocytosis and hepatotoxicity  -Provided [employer] handout on Graves' disease  - TSH 3RD GENERATION  - T4, FREE  - HEPATIC FUNCTION PANEL  - T3 TOTAL  - CBC WITH AUTOMATED DIFF    1. Graves disease      Patient Instructions   You are taking methimazole or PTU for treatment of your hyperthyroidism. One out of 300 patients taking either of these drugs may develop agranulocytosis, lowering of the white blood cell count, which resolves once the drug is stopped. White blood cells help your body to fight infection. Patients with low white blood cell counts develop fevers and sore throats. During the entire period you are taking methimazole, if you develop a fever or sore throat you must stop the drug and go immediately to a lab to get a blood test for a white blood cell count. Do not resume taking the methimazole until you hear from me or another physician. Please present this slip to the lab so that they will know what blood test to do and will be able to fax the result to me. You must then call [facility] at [phone] and ask to speak with the physician on call. You MUST speak to the on call physician to tell him/her that you have had either a fever or a sore throat and the blood test has been drawn.     In addition, if you are nauseated or lose your appetite for more than 2 days, you must stop the drug and call the Endocrinology clinic. To the lab: This patient needs a CBC with differential  Please fax the result to               Cory Gamboa MD [phone]                                                         Diagnosis: 242.00      Follow-up and Dispositions    Routing History         Copy sent to: Soila Moreno, [facility]

## 2022-06-21 NOTE — PATIENT INSTRUCTIONS
You are taking methimazole or PTU for treatment of your hyperthyroidism. One out of 300 patients taking either of these drugs may develop agranulocytosis, lowering of the white blood cell count, which resolves once the drug is stopped. White blood cells help your body to fight infection. Patients with low white blood cell counts develop fevers and sore throats. During the entire period you are taking methimazole, if you develop a fever or sore throat you must stop the drug and go immediately to a lab to get a blood test for a white blood cell count. Do not resume taking the methimazole until you hear from me or another physician. Please present this slip to the lab so that they will know what blood test to do and will be able to fax the result to me. You must then call [facility] at [phone] and ask to speak with the physician on call. You MUST speak to the on call physician to tell him/her that you have had either a fever or a sore throat and the blood test has been drawn. In addition, if you are nauseated or lose your appetite for more than 2 days, you must stop the drug and call the Endocrinology clinic. To the lab:   This patient needs a CBC with differential  Please fax the result to               Oriana Macedo MD [phone]                                                         Diagnosis: 242.00

## 2022-06-22 DIAGNOSIS — E05.00 GRAVES DISEASE: Primary | ICD-10-CM

## 2022-06-22 DIAGNOSIS — E05.00 GRAVES DISEASE: ICD-10-CM

## 2022-06-22 DIAGNOSIS — R79.89 LOW TSH LEVEL: ICD-10-CM

## 2022-06-22 LAB
ALBUMIN SERPL-MCNC: 3.4 G/DL (ref 3.7–4.7)
ALP SERPL-CCNC: 103 IU/L (ref 44–121)
ALT SERPL-CCNC: 6 IU/L (ref 0–32)
AST SERPL-CCNC: 5 IU/L (ref 0–40)
BASOPHILS # BLD AUTO: 0 X10E3/UL (ref 0–0.2)
BASOPHILS NFR BLD AUTO: 0 %
BILIRUB DIRECT SERPL-MCNC: 0.15 MG/DL (ref 0–0.4)
BILIRUB SERPL-MCNC: 0.3 MG/DL (ref 0–1.2)
EOSINOPHIL # BLD AUTO: 0.2 X10E3/UL (ref 0–0.4)
EOSINOPHIL NFR BLD AUTO: 2 %
ERYTHROCYTE [DISTWIDTH] IN BLOOD BY AUTOMATED COUNT: 15.7 % (ref 11.7–15.4)
HCT VFR BLD AUTO: 28.2 % (ref 34–46.6)
HGB BLD-MCNC: 8.6 G/DL (ref 11.1–15.9)
IMM GRANULOCYTES # BLD AUTO: 0 X10E3/UL (ref 0–0.1)
IMM GRANULOCYTES NFR BLD AUTO: 0 %
LYMPHOCYTES # BLD AUTO: 3.1 X10E3/UL (ref 0.7–3.1)
LYMPHOCYTES NFR BLD AUTO: 31 %
MCH RBC QN AUTO: 23.8 PG (ref 26.6–33)
MCHC RBC AUTO-ENTMCNC: 30.5 G/DL (ref 31.5–35.7)
MCV RBC AUTO: 78 FL (ref 79–97)
MONOCYTES # BLD AUTO: 0.6 X10E3/UL (ref 0.1–0.9)
MONOCYTES NFR BLD AUTO: 6 %
NEUTROPHILS # BLD AUTO: 6 X10E3/UL (ref 1.4–7)
NEUTROPHILS NFR BLD AUTO: 61 %
PLATELET # BLD AUTO: 265 X10E3/UL (ref 150–450)
PROT SERPL-MCNC: 6.4 G/DL (ref 6–8.5)
RBC # BLD AUTO: 3.62 X10E6/UL (ref 3.77–5.28)
T3 SERPL-MCNC: 88 NG/DL (ref 71–180)
T4 FREE SERPL-MCNC: 0.99 NG/DL (ref 0.82–1.77)
TSH SERPL DL<=0.005 MIU/L-ACNC: 0.01 UIU/ML (ref 0.45–4.5)
WBC # BLD AUTO: 10 X10E3/UL (ref 3.4–10.8)

## 2022-06-22 RX ORDER — METHIMAZOLE 10 MG/1
5 TABLET ORAL DAILY
Qty: 30 TABLET | Refills: 1
Start: 2022-06-22 | End: 2022-08-30 | Stop reason: ALTCHOICE

## 2022-06-22 NOTE — LETTER
6/22/2022    [address] [place] [id number]      Dear Ky Doing:    Please find your most recent results below. Resulted Orders   CBC WITH AUTOMATED DIFF   Result Value Ref Range    WBC 10.0 3.4 - 10.8 x10E3/uL    RBC 3.62 (L) 3.77 - 5.28 x10E6/uL    HGB 8.6 (L) 11.1 - 15.9 g/dL    HCT 28.2 (L) 34.0 - 46.6 %    MCV 78 (L) 79 - 97 fL    MCH 23.8 (L) 26.6 - 33.0 pg    MCHC 30.5 (L) 31.5 - 35.7 g/dL    RDW 15.7 (H) 11.7 - 15.4 %    PLATELET 801 517 - 951 x10E3/uL    NEUTROPHILS 61 Not Estab. %    Lymphocytes 31 Not Estab. %    MONOCYTES 6 Not Estab. %    EOSINOPHILS 2 Not Estab. %    BASOPHILS 0 Not Estab. %    ABS. NEUTROPHILS 6.0 1.4 - 7.0 x10E3/uL    Abs Lymphocytes 3.1 0.7 - 3.1 x10E3/uL    ABS. MONOCYTES 0.6 0.1 - 0.9 x10E3/uL    ABS. EOSINOPHILS 0.2 0.0 - 0.4 x10E3/uL    ABS. BASOPHILS 0.0 0.0 - 0.2 x10E3/uL    IMMATURE GRANULOCYTES 0 Not Estab. %    ABS. IMM. GRANS. 0.0 0.0 - 0.1 x10E3/uL    Narrative    Performed at:  [address]  [id number]  : Tasia Woods MD, Phone:  [phone]   HEPATIC FUNCTION PANEL   Result Value Ref Range    Protein, total 6.4 6.0 - 8.5 g/dL    Albumin 3.4 (L) 3.7 - 4.7 g/dL    Bilirubin, total 0.3 0.0 - 1.2 mg/dL    Bilirubin, direct 0.15 0.00 - 0.40 mg/dL    Alk.  phosphatase 103 44 - 121 IU/L    AST (SGOT) 5 0 - 40 IU/L    ALT (SGPT) 6 0 - 32 IU/L    Narrative    Performed at:  [place], [place]  [id number]  : Tasia Woods MD, Phone:  [phone]   T4, FREE   Result Value Ref Range    T4, Free 0.99 0.82 - 1.77 ng/dL    Narrative    Performed at:  [address]  [id number]  : Tasia Woods MD, Phone:  [phone]   TSH 3RD GENERATION   Result Value Ref Range    TSH 0.012 (L) 0.450 - 4.500 uIU/mL    Narrative    Performed at:  [facility] , [place], [place]  [id number]  : Deb Welsh MD, Phone:  [phone]   T3 TOTAL   Result Value Ref Range    T3, total 88 71 - 180 ng/dL    Narrative    Performed at:  [address] [facility]  [address], [place], [place]  [id number]  : Deb Welsh MD, Phone:  [phone]       RECOMMENDATIONS:    Julia Serrano,    Your thyroid levels have improved with methimazole 10mg once daily. Drop the dose to 5mg once daily (half a pill) and repeat labs in 2 weeks. The medicine hasn't impacted your liver or blood counts but you are pretty anemic and this is worse than 1 year ago (see below).       You should discuss potential causes of anemia with Parish Armendariz MD.     Please call me if you have any questions: [phone]    Sincerely,      Matilde Cheng MD

## 2022-06-24 DIAGNOSIS — R68.89 OTHER GENERAL SYMPTOMS AND SIGNS: ICD-10-CM

## 2022-06-24 DIAGNOSIS — D50.9 MICROCYTIC ANEMIA: Primary | ICD-10-CM

## 2022-06-24 RX ORDER — FERROUS SULFATE 325(65) MG
325 TABLET, DELAYED RELEASE (ENTERIC COATED) ORAL DAILY
Qty: 30 TABLET | Refills: 1 | Status: SHIPPED | OUTPATIENT
Start: 2022-06-24 | End: 2022-09-09

## 2022-06-25 NOTE — PROGRESS NOTES
Labs to follow-up worsening anemia with new microcytosis requested. To start on iron supplement pending these evaluations.      Pearle Gilford, MD

## 2022-06-29 ENCOUNTER — TELEPHONE (OUTPATIENT)
Dept: ENDOCRINOLOGY | Age: 77
End: 2022-06-29

## 2022-06-29 NOTE — TELEPHONE ENCOUNTER
Spoke with pt son, Messi Eli, and after he verified his mother's name and date of birth, he was made aware that his mother's lab orders will be in the [facility] system, but he can also stop by the office to  the the orders as well. Messi Eli voiced understanding to what was stated.

## 2022-06-29 NOTE — TELEPHONE ENCOUNTER
6/29/2022  2:56 PM    Pt son called and stated his mom has to get lab work done in 2 weeks. He would like to know if [facility] will have the order for the pt or do he need to stop by the office to  the order. Mr. Melanie Tenorio is okay with the nurse responding to this message through his mom's mychart but he can be reached at [phone].     Thanks,   Luis F Hoffmann

## 2022-06-30 DIAGNOSIS — N18.32 CKD STAGE G3B/A2, GFR 30-44 AND ALBUMIN CREATININE RATIO 30-299 MG/G (HCC): ICD-10-CM

## 2022-06-30 DIAGNOSIS — I42.8 OTHER CARDIOMYOPATHY (HCC): ICD-10-CM

## 2022-06-30 NOTE — TELEPHONE ENCOUNTER
Last visit 06/10/2022 MD Bhupinder Croft   Next appointment 09/09/2022 MD Bhupinder Croft   Previous refill encounter(s)  04/29/2022 Lasix #30 with 2 refills    For Pharmacy Admin Tracking Only     Intervention Detail: New Rx: 1, reason: Patient Preference   Time Spent (min): 5        Requested Prescriptions     Pending Prescriptions Disp Refills    furosemide (LASIX) 40 mg tablet 30 Tablet 2     Sig: Take 1 Tablet by mouth daily.

## 2022-07-01 RX ORDER — FUROSEMIDE 40 MG/1
40 TABLET ORAL DAILY
Qty: 90 TABLET | Refills: 1 | Status: SHIPPED | OUTPATIENT
Start: 2022-07-01

## 2022-07-05 ENCOUNTER — DOCUMENTATION ONLY (OUTPATIENT)
Dept: INTERNAL MEDICINE CLINIC | Age: 77
End: 2022-07-05

## 2022-07-05 NOTE — PROGRESS NOTES
[facility] [address] [facility], Provider signed 6/28/2022, Faxed 6/29/2022  to [phone], Confirmation scanned in [facility]

## 2022-07-06 NOTE — TELEPHONE ENCOUNTER
OT Rk Springer) called stating that she is working to get pt a power chair. OT stated that patient was seen recently in office and wanted to know if Dr. Elida Loo would do an addendum or if she needs to schedule another appointment to get this taken care of.   Best contact is [phone]- please advise of next steps
10 y/o male with PMH of AVM transferred from [facility] for 24 hour post-operative monitoring after undergoing embolization for AVM. Patient reports feeling well after procedure, denies headache, changes in vision, nausea / vomiting and has been resting comfortably. Denies any pain or swelling at femoral site. Patient has been receiving Keppra 500 mg BID as seizure prophylaxis (no history of seizure activity) and Pyridoxine 50 mg qday.     On 5/16/22, patient developed sudden severe R-sided headache with R sided vision loss lasting 15 minutes. He was taken by his mother to [facility] where he received a CT and was found to have the AVM. Given its considerable size, he was scheduled to receive two embolizations at [facility] (first conducted today, 7/6/22 and second planned for 8/2/22) and eventual surgical repair. The patient had one other episode of transient vision loss and headache lasting 15 minutes on 5/18/22 but denied any recurrent episodes since.

## 2022-07-16 DIAGNOSIS — Z79.4 CONTROLLED TYPE 2 DIABETES MELLITUS WITH DIABETIC PERIPHERAL ANGIOPATHY WITHOUT GANGRENE, WITH LONG-TERM CURRENT USE OF INSULIN (HCC): ICD-10-CM

## 2022-07-16 DIAGNOSIS — I63.40 CEREBROVASCULAR ACCIDENT (CVA) DUE TO EMBOLISM OF CEREBRAL ARTERY (HCC): ICD-10-CM

## 2022-07-16 DIAGNOSIS — E11.51 CONTROLLED TYPE 2 DIABETES MELLITUS WITH DIABETIC PERIPHERAL ANGIOPATHY WITHOUT GANGRENE, WITH LONG-TERM CURRENT USE OF INSULIN (HCC): ICD-10-CM

## 2022-07-20 RX ORDER — ATORVASTATIN CALCIUM 10 MG/1
TABLET, FILM COATED ORAL
Qty: 90 TABLET | Refills: 3 | Status: SHIPPED | OUTPATIENT
Start: 2022-07-20

## 2022-07-20 RX ORDER — BLOOD-GLUCOSE METER
EACH MISCELLANEOUS
Qty: 1 KIT | Refills: 0 | Status: SHIPPED | OUTPATIENT
Start: 2022-07-20 | End: 2022-07-26

## 2022-07-20 NOTE — PROGRESS NOTES
Addendum to document requirements for lower extremity prosthesis. Ms. Mady Dobson would like to use a lower extremity prosthesis to ambulate - she previously was able to do this and it is realistic and appropriate to resume this. She is not able to use previously provided prosthesis due to weight loss and the cuff no longer fitting in a way to create a seal.   Currently she is reliant on a wheelchair or walker to ambulate. I would expect the patient to use the prosthesis to ambulate with only a cane or without other assistive device. I agree with the plan for Ms Mady Dobson to use the lower extremity prosthesis. The patient is affected by diabetes and diabetic neuropathy - I do not anticipate this to decrease her ability to use the lower extremity prosthesis to walk.      Rj Florentino MD

## 2022-07-23 DIAGNOSIS — E11.51 CONTROLLED TYPE 2 DIABETES MELLITUS WITH DIABETIC PERIPHERAL ANGIOPATHY WITHOUT GANGRENE, WITH LONG-TERM CURRENT USE OF INSULIN (HCC): ICD-10-CM

## 2022-07-23 DIAGNOSIS — Z79.4 CONTROLLED TYPE 2 DIABETES MELLITUS WITH DIABETIC PERIPHERAL ANGIOPATHY WITHOUT GANGRENE, WITH LONG-TERM CURRENT USE OF INSULIN (HCC): ICD-10-CM

## 2022-07-23 DIAGNOSIS — G62.9 NEUROPATHY: ICD-10-CM

## 2022-07-26 RX ORDER — BLOOD-GLUCOSE METER
EACH MISCELLANEOUS
Qty: 1 KIT | Refills: 0 | Status: SHIPPED | OUTPATIENT
Start: 2022-07-26

## 2022-07-26 RX ORDER — GABAPENTIN 100 MG/1
CAPSULE ORAL
Qty: 180 CAPSULE | Refills: 1 | Status: SHIPPED | OUTPATIENT
Start: 2022-07-26 | End: 2022-09-09 | Stop reason: SDUPTHER

## 2022-08-23 DIAGNOSIS — G62.9 NEUROPATHY: ICD-10-CM

## 2022-08-23 DIAGNOSIS — D50.9 MICROCYTIC ANEMIA: ICD-10-CM

## 2022-08-26 RX ORDER — FERROUS SULFATE 325(65) MG
325 TABLET, DELAYED RELEASE (ENTERIC COATED) ORAL DAILY
Qty: 90 TABLET | Refills: 0 | OUTPATIENT
Start: 2022-08-26

## 2022-08-26 NOTE — TELEPHONE ENCOUNTER
Last visit 06/10/2022 MD Nicole Vazquez   Next appointment 09/09/2022 MD Nicole Vazquez   Previous refill encounter(s)   06/24/2022 Ferrous Sulfate (Iron) #30 with 1 refill.      For Pharmacy Admin Tracking Only    Intervention Detail: New Rx: 1, reason: Patient Preference  Time Spent (min): 5      Requested Prescriptions     Pending Prescriptions Disp Refills    ferrous sulfate (IRON) 325 mg (65 mg iron) EC tablet [Pharmacy Med Name: FERROUS SULFATE 325MG EC TABS RED] 90 Tablet 0     Sig: TAKE 1 TABLET BY MOUTH DAILY

## 2022-08-29 ENCOUNTER — TELEPHONE (OUTPATIENT)
Dept: ENDOCRINOLOGY | Age: 77
End: 2022-08-29

## 2022-08-29 ENCOUNTER — TELEPHONE (OUTPATIENT)
Dept: INTERNAL MEDICINE CLINIC | Age: 77
End: 2022-08-29

## 2022-08-29 DIAGNOSIS — D50.9 MICROCYTIC ANEMIA: ICD-10-CM

## 2022-08-29 RX ORDER — FERROUS SULFATE 325(65) MG
325 TABLET, DELAYED RELEASE (ENTERIC COATED) ORAL DAILY
Qty: 30 TABLET | Refills: 1 | Status: CANCELLED | OUTPATIENT
Start: 2022-08-29

## 2022-08-29 NOTE — TELEPHONE ENCOUNTER
8/29/2022  4:10 PM      Pt son Vicky Roberts called on  [email or web address] pm and left a voice mail on behalf of pt. Cipriano Ashford stated the pt had lab work done back in July and still have not gotten the results. Cipriano Ashford stated the pt has an appointment next week with her PCP and they wanted to know her lab results. Cipriano Ashford pt AKIN#[phone]      Thanks,  Dino Holloway

## 2022-08-29 NOTE — TELEPHONE ENCOUNTER
Pt son Archana Delacruz) left a voice message requesting a return call regarding the denial of the Ferrous Sulfate by Dr Keely Kessler on 08/23/2022. Per Devaughn Baker pt (mom) is out of the medication. [phone] [id number]      Requested Prescriptions     Pending Prescriptions Disp Refills    ferrous sulfate (IRON) 325 mg (65 mg iron) EC tablet 30 Tablet 1     Sig: Take 1 Tablet by mouth daily.

## 2022-08-29 NOTE — TELEPHONE ENCOUNTER
Iron level needs reasessment prior to continuation of oral iron.      We can discuss at follow-up  Please bring any lab results available from nephrology     Alphonsa Hatchet, MD

## 2022-08-29 NOTE — TELEPHONE ENCOUNTER
[facility] was called to f/u on lab results and was told last labs were drawn 7/8/22 and would fax results to our office. Mr Gómez Rosario was then made aware  and he voiced understanding. I also told Mr Gómez Rosario that I would scan the results once reviewed by Dr Kari Velazquez so that pt's pcp will be able to review them as well. Ariana Llamas

## 2022-08-30 ENCOUNTER — DOCUMENTATION ONLY (OUTPATIENT)
Dept: ENDOCRINOLOGY | Age: 77
End: 2022-08-30

## 2022-08-30 RX ORDER — METHIMAZOLE 5 MG/1
2.5 TABLET ORAL DAILY
Qty: 45 TABLET | Refills: 0 | Status: SHIPPED | OUTPATIENT
Start: 2022-08-30

## 2022-08-30 NOTE — PROGRESS NOTES
I'm not sure why it took me almost 2 months to get these lab results from [address] very strange. Please apologize that I didn't give them results- I didn't get them! Her labs show that we can reduce her dose of methimazole to 2.5mg once daily. (1/2 a 5mg tab)    I will send a new prescription in. I will give her new lab slips at her visit in October.      Free T4 is 0.74 ng/dL, Total T3 is 59 (low)  WBC 10.2, LFTs normal    Eliot Hurd [facility]

## 2022-08-30 NOTE — PROGRESS NOTES
Pt's son was notified of Dr Irving Shed message after he verified his mother's name and  and he voiced understanding of what was read to him.

## 2022-09-01 RX ORDER — METHIMAZOLE 5 MG/1
TABLET ORAL
Qty: 45 TABLET | Refills: 0 | OUTPATIENT
Start: 2022-09-01

## 2022-09-07 ENCOUNTER — TRANSCRIBE ORDER (OUTPATIENT)
Dept: SCHEDULING | Age: 77
End: 2022-09-07

## 2022-09-07 DIAGNOSIS — E11.51 TYPE II DIABETES MELLITUS WITH PERIPHERAL CIRCULATORY DISORDER (HCC): Primary | ICD-10-CM

## 2022-09-09 ENCOUNTER — OFFICE VISIT (OUTPATIENT)
Dept: INTERNAL MEDICINE CLINIC | Age: 77
End: 2022-09-09
Payer: MEDICARE

## 2022-09-09 VITALS
HEIGHT: 66 IN | DIASTOLIC BLOOD PRESSURE: 63 MMHG | TEMPERATURE: 98.6 F | HEART RATE: 54 BPM | BODY MASS INDEX: 18.16 KG/M2 | WEIGHT: 113 LBS | OXYGEN SATURATION: 98 % | SYSTOLIC BLOOD PRESSURE: 129 MMHG

## 2022-09-09 DIAGNOSIS — I63.40 CEREBROVASCULAR ACCIDENT (CVA) DUE TO EMBOLISM OF CEREBRAL ARTERY (HCC): ICD-10-CM

## 2022-09-09 DIAGNOSIS — E11.42 DIABETIC POLYNEUROPATHY ASSOCIATED WITH TYPE 2 DIABETES MELLITUS (HCC): Primary | ICD-10-CM

## 2022-09-09 DIAGNOSIS — I10 ESSENTIAL HYPERTENSION: ICD-10-CM

## 2022-09-09 DIAGNOSIS — E11.51 CONTROLLED TYPE 2 DIABETES MELLITUS WITH DIABETIC PERIPHERAL ANGIOPATHY WITHOUT GANGRENE, WITH LONG-TERM CURRENT USE OF INSULIN (HCC): ICD-10-CM

## 2022-09-09 DIAGNOSIS — Z23 ENCOUNTER FOR IMMUNIZATION: ICD-10-CM

## 2022-09-09 DIAGNOSIS — M81.0 AGE-RELATED OSTEOPOROSIS WITHOUT CURRENT PATHOLOGICAL FRACTURE: ICD-10-CM

## 2022-09-09 DIAGNOSIS — S78.112A UNILATERAL AKA, LEFT (HCC): ICD-10-CM

## 2022-09-09 DIAGNOSIS — N18.4 CKD (CHRONIC KIDNEY DISEASE) STAGE 4, GFR 15-29 ML/MIN (HCC): ICD-10-CM

## 2022-09-09 DIAGNOSIS — I69.30 HISTORY OF STROKE WITH CURRENT RESIDUAL EFFECTS: ICD-10-CM

## 2022-09-09 DIAGNOSIS — E13.51 PERIPHERAL VASCULAR DISEASE DUE TO SECONDARY DIABETES (HCC): ICD-10-CM

## 2022-09-09 DIAGNOSIS — D22.71: ICD-10-CM

## 2022-09-09 DIAGNOSIS — E11.21 DIABETIC NEPHROPATHY ASSOCIATED WITH TYPE 2 DIABETES MELLITUS (HCC): ICD-10-CM

## 2022-09-09 DIAGNOSIS — G62.9 NEUROPATHY: ICD-10-CM

## 2022-09-09 DIAGNOSIS — Z79.4 CONTROLLED TYPE 2 DIABETES MELLITUS WITH DIABETIC PERIPHERAL ANGIOPATHY WITHOUT GANGRENE, WITH LONG-TERM CURRENT USE OF INSULIN (HCC): ICD-10-CM

## 2022-09-09 PROBLEM — N18.32 CKD STAGE G3B/A2, GFR 30-44 AND ALBUMIN CREATININE RATIO 30-299 MG/G (HCC): Status: RESOLVED | Noted: 2017-06-21 | Resolved: 2022-09-09

## 2022-09-09 PROBLEM — I42.8 OTHER CARDIOMYOPATHY (HCC): Status: RESOLVED | Noted: 2022-04-29 | Resolved: 2022-09-09

## 2022-09-09 LAB — HBA1C MFR BLD HPLC: 7.6 %

## 2022-09-09 PROCEDURE — G8510 SCR DEP NEG, NO PLAN REQD: HCPCS | Performed by: INTERNAL MEDICINE

## 2022-09-09 PROCEDURE — 83036 HEMOGLOBIN GLYCOSYLATED A1C: CPT | Performed by: INTERNAL MEDICINE

## 2022-09-09 PROCEDURE — 3051F HG A1C>EQUAL 7.0%<8.0%: CPT | Performed by: INTERNAL MEDICINE

## 2022-09-09 PROCEDURE — G0008 ADMIN INFLUENZA VIRUS VAC: HCPCS | Performed by: INTERNAL MEDICINE

## 2022-09-09 PROCEDURE — G8420 CALC BMI NORM PARAMETERS: HCPCS | Performed by: INTERNAL MEDICINE

## 2022-09-09 PROCEDURE — 1101F PT FALLS ASSESS-DOCD LE1/YR: CPT | Performed by: INTERNAL MEDICINE

## 2022-09-09 PROCEDURE — 1090F PRES/ABSN URINE INCON ASSESS: CPT | Performed by: INTERNAL MEDICINE

## 2022-09-09 PROCEDURE — 99214 OFFICE O/P EST MOD 30 MIN: CPT | Performed by: INTERNAL MEDICINE

## 2022-09-09 PROCEDURE — G8754 DIAS BP LESS 90: HCPCS | Performed by: INTERNAL MEDICINE

## 2022-09-09 PROCEDURE — 1123F ACP DISCUSS/DSCN MKR DOCD: CPT | Performed by: INTERNAL MEDICINE

## 2022-09-09 PROCEDURE — 90694 VACC AIIV4 NO PRSRV 0.5ML IM: CPT | Performed by: INTERNAL MEDICINE

## 2022-09-09 PROCEDURE — G8752 SYS BP LESS 140: HCPCS | Performed by: INTERNAL MEDICINE

## 2022-09-09 PROCEDURE — G8536 NO DOC ELDER MAL SCRN: HCPCS | Performed by: INTERNAL MEDICINE

## 2022-09-09 PROCEDURE — G8427 DOCREV CUR MEDS BY ELIG CLIN: HCPCS | Performed by: INTERNAL MEDICINE

## 2022-09-09 RX ORDER — GABAPENTIN 100 MG/1
CAPSULE ORAL
Qty: 180 CAPSULE | Refills: 1 | Status: SHIPPED | OUTPATIENT
Start: 2022-09-09

## 2022-09-09 RX ORDER — CLONIDINE HYDROCHLORIDE 0.2 MG/1
0.2 TABLET ORAL 2 TIMES DAILY
Qty: 180 TABLET | Refills: 1 | Status: SHIPPED | OUTPATIENT
Start: 2022-09-09 | End: 2022-11-03 | Stop reason: SDUPTHER

## 2022-09-09 RX ORDER — ISOPROPYL ALCOHOL 70 ML/100ML
SWAB TOPICAL
Qty: 300 PAD | Refills: 3 | Status: SHIPPED | OUTPATIENT
Start: 2022-09-09

## 2022-09-09 RX ORDER — GLIMEPIRIDE 1 MG/1
1 TABLET ORAL
Qty: 90 TABLET | Refills: 1 | Status: SHIPPED | OUTPATIENT
Start: 2022-09-09

## 2022-09-09 NOTE — PROGRESS NOTES
A/P:  Roddy Vergara is a 76 y.o. female, she presents today for:    1. Diabetic polyneuropathy associated with type 2 diabetes mellitus (HCC)  -     AMB POC HEMOGLOBIN A1C  -     glimepiride (AMARYL) 1 mg tablet; Take 1 Tablet by mouth Daily (before breakfast). , Normal, Disp-90 Tablet, R-1  -     alcohol swabs (BD Single Use Swabs Regular) padm; USE AS DIRECTED, Normal, Disp-300 Pad, R-3  2. Diabetic nephropathy associated with type 2 diabetes mellitus (HCC)  -     glimepiride (AMARYL) 1 mg tablet; Take 1 Tablet by mouth Daily (before breakfast). , Normal, Disp-90 Tablet, R-1  3. Controlled type 2 diabetes mellitus with diabetic peripheral angiopathy without gangrene, with long-term current use of insulin ([facility])  -     glimepiride (AMARYL) 1 mg tablet; Take 1 Tablet by mouth Daily (before breakfast). , Normal, Disp-90 Tablet, R-1  4. CKD (chronic kidney disease) stage 4, GFR 15-29 ml/min ([facility])  5. Atypical nevus of ankle, right  -     REFERRAL TO DERMATOLOGY  6. Neuropathy  -     gabapentin (NEURONTIN) 100 mg capsule; TAKE 1 CAPSULE TWICE DAILY (MAX DAILY AMOUNT: 200MG), Normal, Disp-180 Capsule, R-1  7. Essential hypertension  -     cloNIDine HCL (CATAPRES) 0.2 mg tablet; Take 1 Tablet by mouth two (2) times a day., Normal, Disp-180 Tablet, R-1  8. Cerebrovascular accident (CVA) due to embolism of cerebral artery ([facility])  -     apixaban (Eliquis) 2.5 mg tablet; Take 1 Tablet by mouth two (2) times a day., Normal, Disp-180 Tablet, R-1  9. Encounter for immunization  -     INFLUENZA, FLUAD, (AGE 72 Y+), IM, PF, 0.5 ML      CKD -stage 4 - following with hartle - to start on iron supplement and possible procrit after last visit. - recent labs shows stable kidney function. Diabetes: not currently well controlled with A1C of 7.6    - was diet controlled, due to kidney function not a candidate for metformin.     - start low dose sulfonurea   - disucssed risk of low blood sugar.    - could consider GLP-1 type in future. Hyperthyroid, graves (+trab) - diagnosed at hospital when evaluated for acute respiratory failure and fluid overload - TSH: < 0.007 - undetectable. Free T4 was 1.83    - endocrine Dr. Katlyn Wolf now managing. Anticoagulated - 6/2020 history of scattered strokes emobolic - PFO - abnormal spect perfusion study with MI and abn wall motion - discharged on eliquis and this has been continued since. Neurology Latoya Jones and cardioliogy jah. - will continue eliquis at this time- kennedy I have asked patient to review with neurology if this is needed. - continue aspirin. Left Above knee amputation - patient with weight loss and prosthesis no longer fitting.    - working with therapist and now able to ambulate with prosthesis. ATypical nevus - referral to derm provided for skin review and possible biopsy     Future Appointments   Date Time Provider Inge Aburto   9/15/2022  9:30  [address] [place] [address] [facility]   9/20/2022 11:00 AM A3 PEDS [facility]   9/27/2022 11:00 AM A3 PEDS [facility]   10/5/2022 11:30 AM Pj Avilez MD RDE [facility] AM[facility]       HPI    76year old woman, reports she is doing well today - good appettite. Fatigue - is working with nephrology for iron infusions. Recently seen and labs reported as stable. PMH/PSH: reviewed and updated  Sochx/Famhx: reviewed and updated     All: No Known Allergies  Med:   Current Outpatient Medications   Medication Sig    methIMAzole (TAPAZOLE) 5 mg tablet Take 0.5 Tablets by mouth daily. gabapentin (NEURONTIN) 100 mg capsule TAKE 1 CAPSULE TWICE DAILY (MAX DAILY AMOUNT: 200MG)    True Metrix Glucose Meter monitoring kit USE AS DIRECTED    atorvastatin (LIPITOR) 10 mg tablet TAKE 1 TABLET EVERY DAY    furosemide (LASIX) 40 mg tablet Take 1 Tablet by mouth daily. ferrous sulfate (IRON) 325 mg (65 mg iron) EC tablet Take 1 Tablet by mouth daily.     Eliquis 2.5 mg tablet Take 1 Tablet by mouth two (2) times a day. amLODIPine (NORVASC) 10 mg tablet Take 1 Tablet by mouth daily. cholecalciferol (VITAMIN D3) (2,000 UNITS /50 MCG) cap capsule TAKE 1 CAPSULE EVERY DAY    cloNIDine HCL (CATAPRES) 0.2 mg tablet TAKE 1 TABLET TWICE DAILY    lancets (TRUEplus Lancets) 33 gauge misc TEST BLOOD SUGAR THREE TIMES DAILY    BD Single Use Swabs Regular padm USE AS DIRECTED    Aspirin Low Dose 81 mg tablet TAKE 1 TABLET BY MOUTH DAILY    sodium bicarbonate 650 mg tablet Take 650 mg by mouth two (2) times a day. glucose blood VI test strips (True Metrix Glucose Test Strip) strip TEST BLOOD SUGAR ON AVERAGE 3 TIMES DAILY. losartan (COZAAR) 25 mg tablet TAKE 1 TABLET BY MOUTH EVERY DAY    carvediloL (COREG) 25 mg tablet Take 25 mg by mouth two (2) times a day. lancets (PRODIGY TWIST TOP LANCET) 28 gauge misc To measure blood sugar 3-4 times daily. Dx code E 11.51, Z79.4    gum/pva/dental adhesive (SUPER DENTURE ADHESIVE DT)      No current facility-administered medications for this visit. ROS pertinent for the following:  ROS    PE:  There were no vitals taken for this visit. There is no height or weight on file to calculate BMI. Physical Exam  Vitals and nursing note reviewed. Constitutional:       General: She is not in acute distress. Appearance: Normal appearance. HENT:      Head: Normocephalic and atraumatic. Eyes:      Extraocular Movements: Extraocular movements intact. Conjunctiva/sclera: Conjunctivae normal.      Pupils: Pupils are equal, round, and reactive to light. Cardiovascular:      Rate and Rhythm: Normal rate and regular rhythm. Heart sounds: Normal heart sounds. Pulmonary:      Effort: Pulmonary effort is normal.      Breath sounds: Normal breath sounds. Musculoskeletal:         General: Normal range of motion. Cervical back: Normal range of motion and neck supple. Right lower leg: No edema.       Comments: Left leg AKA   Skin:     General: Skin is warm and dry. Comments: Half moon or \"comma\" shaped nevus on right lower leg - variable in color   Neurological:      Mental Status: She is alert and oriented to person, place, and time. Labs:   See addendum for interpretation of labs resulting after time of visit. Results for orders placed or performed in visit on 09/09/22   AMB POC HEMOGLOBIN A1C   Result Value Ref Range    Hemoglobin A1c (POC) 7.6 %         She was given AVS and expressed understanding with the diagnosis and plan as discussed. An electronic signature was used to authenticate this note.   -- Kale Jenkins MD

## 2022-09-09 NOTE — PROGRESS NOTES
RM 2    Chief Complaint   Patient presents with    Follow-up     diabetes       Visit Vitals  /63   Pulse (!) 54   Temp 98.6 °F (37 °C)   Ht 5' 5.5\" (1.664 m)   Wt 113 lb (51.3 kg)   SpO2 98%   BMI 18.52 kg/m²       3 most recent PHQ Screens 9/9/2022   Little interest or pleasure in doing things Not at all   Feeling down, depressed, irritable, or hopeless Not at all   Total Score PHQ 2 0         1. Have you been to the ER, urgent care clinic since your last visit? Hospitalized since your last visit? No    2. Have you seen or consulted any other health care providers outside of the [address] [place] [address] since your last visit? Include any pap smears or colon screening. No    Health Maintenance Due   Topic Date Due    Shingrix Vaccine Age 49> (1 of 2) Never done    DTaP/Tdap/Td series (1 - Tdap) Never done    Eye Exam Retinal or Dilated  09/13/2020    COVID-19 Vaccine (3 - Booster for Pfizer series) 08/27/2021    MICROALBUMIN Q1  05/01/2022    Lipid Screen  07/22/2022    Flu Vaccine (1) 09/01/2022       Learning Assessment 6/20/2017   PRIMARY LEARNER Patient   PRIMARY LANGUAGE ENGLISH   LEARNER PREFERENCE PRIMARY READING   ANSWERED BY patient   RELATIONSHIP SELF         AVS  education, follow up, and recommendations provided and addressed with patient.   services used to advise patient -no

## 2022-09-09 NOTE — PATIENT INSTRUCTIONS
1) I would recommend asking the neurologist if strong evidence that eliquis is reducing risk of future stroke.

## 2022-09-12 RX ORDER — SODIUM CHLORIDE 9 MG/ML
25 INJECTION, SOLUTION INTRAVENOUS CONTINUOUS
Status: DISPENSED | OUTPATIENT
Start: 2022-09-20 | End: 2022-09-20

## 2022-09-20 ENCOUNTER — HOSPITAL ENCOUNTER (OUTPATIENT)
Dept: INFUSION THERAPY | Age: 77
Discharge: HOME | End: 2022-09-20
Payer: MEDICARE

## 2022-09-20 VITALS
HEART RATE: 68 BPM | TEMPERATURE: 97.6 F | SYSTOLIC BLOOD PRESSURE: 107 MMHG | RESPIRATION RATE: 18 BRPM | DIASTOLIC BLOOD PRESSURE: 79 MMHG

## 2022-09-20 PROCEDURE — 96374 THER/PROPH/DIAG INJ IV PUSH: CPT

## 2022-09-20 PROCEDURE — 74011250636 HC RX REV CODE- 250/636: Performed by: INTERNAL MEDICINE

## 2022-09-20 PROCEDURE — 74011000258 HC RX REV CODE- 258: Performed by: INTERNAL MEDICINE

## 2022-09-20 RX ORDER — SODIUM CHLORIDE 9 MG/ML
25 INJECTION, SOLUTION INTRAVENOUS ONCE
Status: COMPLETED | OUTPATIENT
Start: 2022-09-27 | End: 2022-09-27

## 2022-09-20 RX ADMIN — SODIUM CHLORIDE 25 ML/HR: 900 INJECTION, SOLUTION INTRAVENOUS at 10:50

## 2022-09-20 RX ADMIN — FERUMOXYTOL 510 MG: 510 INJECTION INTRAVENOUS at 11:20

## 2022-09-20 NOTE — PROGRESS NOTES
[address] [facility] @ [facility] VISIT NOTE    2417 Patient arrives for Feraheme Dose 1 of 2 without acute problems. Please see connect care for complete assessment and education provided. Vital signs stable throughout and prior to discharge, Pt. Tolerated treatment well and discharged without incident. Patient/son is aware of next [facility] appointment on 9/27/2022. Appointment card given to patient/son. Medications Verified by Sylvester Kim RN:  Feraheme 510mg IV over 15 mins  2.    NS flush & [facility] Patient Vitals for the past 12 hrs:   Temp Pulse Resp BP   09/20/22 1207 -- 68 18 107/79   09/20/22 1137 -- 65 18 118/63   09/20/22 1120 -- 61 18 (!) 116/52   09/20/22 1039 97.6 °F (36.4 °C) 67 18 112/61 Acute Care - [facility] Physical Therapy Treatment Note  [facility]     Patient Name: Rox Good  : 2024  MRN: [id number]  Today's Date: 2022       Date of Referral to PT: 22         Admit Date: 2022     Visit Dx:    ICD-10-CM ICD-9-CM   1. Slow feeding in   P92.2 779.31       Patient Active Problem List   Diagnosis   • Premature infant of 33 weeks gestation        No past medical history on file.     No past surgical history on file.      PT/OT NICU Eval/Treat (last 12 hours)     NICU PT/OT Eval/Treat     Row Name 22 1100 22 1030 22 0800 22 0500 22 0200       Visit Information    Discipline for Visit -- Physical Therapy  -NS -- -- --    Document Type -- therapy note (daily note)  -NS -- -- --    Family Present -- no  -NS -- -- --    Recorded by  [NS] Sayda Luciano, PT          History    Medical Interventions -- cardiac monitor;isolette;OG/NG/NJ/G-tube;oxygen sats monitor  -NS -- -- --    History, Comment -- 34 5/7 wk pma  -NS -- -- --    Recorded by  [NS] Sayda Luciano, PT          Observation    General/Environment Observations -- supine;positioning aid;macro-isolette;micro-swaddled;NG/OG;low light level;low sound level  rolled into partial L sidelying  -NS -- -- --    State of Consciousness -- quiet alert  -NS -- -- --    Appearance -- head shape: typical round  wfl symmetry frontal and occiput with ears level  -NS -- -- --    Behavior -- organized  -NS -- -- --    Neurobehavior, General Comment -- intermittent outturning  -NS -- -- --    Neurobehavior, Autonomic -- stability  -NS -- -- --    Neurobehavior, State -- drowsy  -NS -- -- --    Neurobehavior, Self-Regulatory -- hands to face/lines, grasp  -NS -- -- --    Recorded by  [NS] Sayda Luciano, PT          Vital Signs    Temperature -- 98.7 °F (37.1 °C)  -NS -- -- --    Recorded by  [NS] Sayda Luciano PT          NIPS (/Infant Pain Scale) Pre-Tx    Facial Expression (Pre-Tx) -- 0   -NS -- -- --    Cry (Pre-Tx) -- 0  -NS -- -- --    Breathing Patterns (Pre-Tx) -- 0  -NS -- -- --    Arms (Pre-Tx) -- 0  -NS -- -- --    Legs (Pre-Tx) -- 0  -NS -- -- --    State of Arousal (Pre-Tx) -- 0  -NS -- -- --    NIPS Score (Pre-Tx) -- 0  -NS -- -- --    Recorded by  [NS] Sayda Luciano, PT          NIPS (/Infant Pain Scale) Post-Tx    Facial Expression (Post-Tx) -- 0  -NS -- -- --    Cry (Post-Tx) -- 0  -NS -- -- --    Breathing Patterns (Post-Tx) -- 0  -NS -- -- --    Arms (Post-Tx) -- 0  -NS -- -- --    Legs (Post-Tx) -- 0  -NS -- -- --    State of Arousal (Post-Tx) -- 0  -NS -- -- --    NIPS Score (Post-Tx) -- 0  -NS -- -- --    Recorded by  [NS] Sayda Luciano, PT          Posture    Posture, General Comment -- bias towards L cervical rotation today. RN reports pt has been rotating head to R this morning.  -NS -- -- --    Recorded by  [NS] Sayda Luciano, PT          Movement    Overall Movement Comment -- free movement-supine in PT lap, observe movement into LE extension needing support intermittently to return to flexion.  -NS -- -- --    Recorded by  [NS] Sayda Luciano, PT          Reflexes    Sucking Reflex -- coordinated suck on soothie  -NS -- -- --    Rooting Reflex -- elicited  -NS -- -- --    Palmar Grasp -- present B  -NS -- -- --    Arm Recoil -- elbow flexion to >100 in 2-3 seconds  partial elbow flexion followed by shoulder flexion  -NS -- -- --    Plantar Grasp -- present B  -NS -- -- --    Leg Recoil Present -- complete fast flexion  -NS -- -- --    Popliteal Angle -- resistance at approx. 110 degrees  -NS -- -- --    Overall Reflexes Comment -- symmetrical responses noted today, responses more mature than expected for pma  -NS -- -- --    Recorded by  [NS] Sayda Luciano, PT          Stimulation    Behavioral Response to Handling -- organized  -NS -- -- --    Tactile/Proprioceptive Response to Stim -- tolerates handling;calms with sensory input  -NS -- -- --    Overall Stimulation  Comment -- Maintained quiet alert state with eyes closed with NNS. Difficulty maintaining organization without soothie.  -NS -- -- --    Recorded by  [NS] Sayda Luciano PT          Developmental Therapy    Midline Facilitation -- Head/Neck  -NS -- -- --    Neurobehavioral Facilitation -- NNS, containment, foot bracing  -NS -- -- --    PROM -- cervical ROM- wfl and symmetrical B rotation and lateral flexion. No signs of pain or discomfort.  -NS -- -- --    Therapeutic Handling -- Preparatory touch;Facilitation of hands to face;Head boundary;Foot bracing;Posterior pelvic tilt;Facilitation of head to midline;Facilitation of hands to midline;Containment facilitated;Assist of positioning devices;Non-nutritive suck supported;Increased neurobehavioral organization  -NS -- -- --    Therapeutic Positioning -- Supine;Gel Pillow;Dandle Wrap;Posterior pelvic tilt;Scapular protraction;Developmental flexion of BUEs;Developmental Flexion of BLEs;Containment facilitated;Head boundary;Head in midline;Foot bracing;Swaddled  PALs at head and pelvis  -NS -- -- --    Environmental Adaptations -- Eyes shielded;Room lights dim;Isolette cover used;Room remained quiet  -NS -- -- --    Age Appropriate Dev. Activities -- whisper level conversation prior to touch and throughout handling modulated by pt's response  -NS -- -- --    Recorded by  [NS] Sayda Luciano PT          Breast Milk    Breast Milk Ordered Amount 32 mL  DBM [id number]  -RB -- 31 mL  DBM [id number]  -RB 31 mL  -EE 31 mL  -EE    Recorded by [RB] Sarah Beth Lee RN  [RB] Sarah Beth Lee RN [EE] Dana Castro, JUVE [EE] Dana Castro, JUVE       Post Treatment Position    Post Treatment Position -- supine;swaddled;positioning aid;with nursing  -NS -- -- --    Post Treatment State of Consciousness -- Quiet alert  -NS -- -- --    Recorded by  [NS] Sayda Luciano PT          Assessment    Rehab Potential -- good  -NS -- -- --    Rehab Barriers -- medically complex  -NS -- -- --    Problem  List -- asymmetrical posture;atypical movement patterns;atypical tone;decreased behavioral organization;parent/caregiver knowledge deficit;at risk for developmental delay  -NS -- -- --    Family Agrees Goals/Plan -- family not available  -NS -- -- --    Reviewed Therapy Risks -- family not available  -NS -- -- --    Reviewed Therapy Benefits -- family not available  -NS -- -- --    Recorded by  [NS] Sayda Luciano, PT          PT Plan    PT Treatment Plan -- developmental positioning;education;environmental modification;therapeutic activities;ROM;therapeutic handling/touch  -NS -- -- --    PT Treatment Frequency -- 1-2x/wk  -NS -- -- --    PT Re-Evaluation Due Date -- 12/21/22  -NS -- -- --    Recorded by  [NS] Sayda Luciano PT             User Key  (r) = Recorded By, (t) = Taken By, (c) = Cosigned By    Initials Name Effective Dates    RB Sarah Beth Lee RN 06/16/21 -     Sayda Lo PT 06/16/21 -     Dana Castillo RN 09/08/21 -                     PT Recommendation and Plan  Outcome Evaluation: Dream was drowsy during visit, keeping eyes closed but tolerated handling with support from NNS. Note bias towards L cervical rotation, and RN reporting bias towards R cervical rotation earlier today. Head shape symmetrical and cervical PROM wfl and symmetrical with no signs of pain or discomfort. Recomment continuation of gel pillow, PALs, and placing back to sleep head midline.                PT Rehab Goals     Row Name 12/14/22 1030             Bed Mobility Goal 3 (PT)    Bed Mobility Goal (PT) orient to L and R side of bedspace  -NS      Time Frame (Bed Mobility Goal 3, PT) long term goal (LTG);by discharge  -NS      Progress/Outcomes (Bed Mobility Goal 3, PT) goal ongoing  -NS         Caregiver Training Goal 1 (PT)    Caregiver Training Goal 1 (PT) parents provided with discharge education  -NS      Time Frame (Caregiver Training Goal 1, PT) long-term goal (LTG);by discharge  -NS      Progress/Outcomes  (Caregiver Training Goal 1, PT) goal ongoing  -NS         Problem Specific Goal 1 (PT)    Problem Specific Goal 1 (PT) craniofacial symmetry 3 qudrants (frontal, occiput, ear level)  -NS      Time Frame (Problem Specific Goal 1, PT) short-term goal (STG);2 weeks  -NS      Progress/Outcome (Problem Specific Goal 1, PT) goal met  -NS         Problem Specific Goal 2 (PT)    Problem Specific Goal 2 (PT) UE recoil symmetrical and consistent with pma  -NS      Time Frame (Problem Specific Goal 2, PT) short-term goal (STG);2 weeks  -NS      Progress/Outcome (Problem Specific Goal 2, PT) goal met  -NS            User Key  (r) = Recorded By, (t) = Taken By, (c) = Cosigned By    Initials Name Provider Type Discipline    Sayda Lo, PT Physical Therapist PT                       Time Calculation:    PT Charges     Row Name 12/14/22 1119             Time Calculation    Start Time 1030  -NS      PT Received On 12/14/22  -NS      PT Goal Re-Cert Due Date 12/21/22  -NS         Timed Charges    45651 - PT Therapeutic Activity Minutes 24  -NS         Total Minutes    Timed Charges Total Minutes 24  -NS       Total Minutes 24  -NS            User Key  (r) = Recorded By, (t) = Taken By, (c) = Cosigned By    Initials Name Provider Type    Sayda Lo PT Physical Therapist                Therapy Charges for Today     Code Description Service Date Service Provider Modifiers Qty    10702765322 HC PT THERAPEUTIC ACT EA 15 MIN 2022 Sayda Luciano PT GP 2                      Sayda Luciano PT  2022

## 2022-09-21 ENCOUNTER — DOCUMENTATION ONLY (OUTPATIENT)
Dept: INTERNAL MEDICINE CLINIC | Age: 77
End: 2022-09-21

## 2022-09-21 ENCOUNTER — HOSPITAL ENCOUNTER (OUTPATIENT)
Dept: VASCULAR SURGERY | Age: 77
Discharge: HOME | End: 2022-09-21
Attending: PODIATRIST
Payer: MEDICARE

## 2022-09-21 DIAGNOSIS — E11.51 TYPE II DIABETES MELLITUS WITH PERIPHERAL CIRCULATORY DISORDER (HCC): ICD-10-CM

## 2022-09-21 PROCEDURE — 93922 UPR/L XTREMITY ART 2 LEVELS: CPT

## 2022-09-21 NOTE — PROGRESS NOTES
[facility], Discharge From Agency Order, Provider signed 9/2/2022, Faxed 9/7/2022 to [phone] , Confirmation fax scanned in [facility]

## 2022-09-23 LAB
LEFT ARM BP: 118 MMHG
RIGHT ARM BP: 118 MMHG
RIGHT POSTERIOR TIBIAL: >240 MMHG
RIGHT TBI: 0.66
RIGHT TOE PRESSURE: 78 MMHG
VAS RIGHT DORSALIS PEDIS BP: >240 MMHG

## 2022-09-24 DIAGNOSIS — I10 ESSENTIAL HYPERTENSION: ICD-10-CM

## 2022-09-26 ENCOUNTER — DOCUMENTATION ONLY (OUTPATIENT)
Dept: INTERNAL MEDICINE CLINIC | Age: 77
End: 2022-09-26

## 2022-09-26 RX ORDER — AMLODIPINE BESYLATE 10 MG/1
TABLET ORAL
Qty: 90 TABLET | Refills: 4 | Status: SHIPPED | OUTPATIENT
Start: 2022-09-26

## 2022-09-26 NOTE — PROGRESS NOTES
[facility], Discharge Order, Provider signed 8/23/2022, Faxed 8/24/2022 to [phone], Confirmation fax scanned in [facility]

## 2022-09-27 ENCOUNTER — HOSPITAL ENCOUNTER (OUTPATIENT)
Dept: INFUSION THERAPY | Age: 77
Discharge: HOME | End: 2022-09-27
Payer: MEDICARE

## 2022-09-27 VITALS
RESPIRATION RATE: 18 BRPM | HEART RATE: 60 BPM | DIASTOLIC BLOOD PRESSURE: 61 MMHG | SYSTOLIC BLOOD PRESSURE: 111 MMHG | TEMPERATURE: 97.2 F

## 2022-09-27 PROCEDURE — 74011250636 HC RX REV CODE- 250/636: Performed by: INTERNAL MEDICINE

## 2022-09-27 PROCEDURE — 74011000250 HC RX REV CODE- 250: Performed by: INTERNAL MEDICINE

## 2022-09-27 PROCEDURE — 74011000258 HC RX REV CODE- 258: Performed by: INTERNAL MEDICINE

## 2022-09-27 PROCEDURE — 96374 THER/PROPH/DIAG INJ IV PUSH: CPT

## 2022-09-27 RX ORDER — SODIUM CHLORIDE 0.9 % (FLUSH) 0.9 %
10 SYRINGE (ML) INJECTION AS NEEDED
Status: DISCONTINUED | OUTPATIENT
Start: 2022-09-27 | End: 2022-09-28 | Stop reason: HOSPADM

## 2022-09-27 RX ADMIN — SODIUM CHLORIDE 25 ML/HR: 9 INJECTION, SOLUTION INTRAVENOUS at 11:35

## 2022-09-27 RX ADMIN — FERUMOXYTOL 510 MG: 510 INJECTION INTRAVENOUS at 12:25

## 2022-09-27 RX ADMIN — SODIUM CHLORIDE, PRESERVATIVE FREE 10 ML: 5 INJECTION INTRAVENOUS at 11:35

## 2022-09-27 NOTE — PROGRESS NOTES
[address] @ [facility] VISIT NOTE    1000 Patient arrives for [facility] Dose 2 of 2 without acute problems. Please see connect care for complete assessment and education provided. Vital signs stable throughout and prior to discharge, Pt. Tolerated treatment well and discharged without incident. Patient/son is aware of no future [facility] appointment. Medications Verified by Fifi Camargo RN:  Feraheme 510mg IV over 15 mins  2.   NS flush & [address] [facility] Patient Vitals for the past 12 hrs:   Temp Pulse Resp BP   09/27/22 1340 -- 60 18 111/61   09/27/22 1310 -- 66 18 118/60   09/27/22 1225 -- (!) 54 18 (!) 110/54   09/27/22 1119 97.2 °F (36.2 °C) (!) 57 18 (!) 128/51

## 2022-10-05 ENCOUNTER — OFFICE VISIT (OUTPATIENT)
Dept: ENDOCRINOLOGY | Age: 77
End: 2022-10-05
Payer: MEDICARE

## 2022-10-05 VITALS
HEIGHT: 66 IN | RESPIRATION RATE: 16 BRPM | BODY MASS INDEX: 15.65 KG/M2 | WEIGHT: 97.4 LBS | DIASTOLIC BLOOD PRESSURE: 59 MMHG | OXYGEN SATURATION: 100 % | SYSTOLIC BLOOD PRESSURE: 145 MMHG | HEART RATE: 76 BPM

## 2022-10-05 DIAGNOSIS — R63.4 WEIGHT LOSS: ICD-10-CM

## 2022-10-05 DIAGNOSIS — N18.4 ANEMIA DUE TO STAGE 4 CHRONIC KIDNEY DISEASE (HCC): ICD-10-CM

## 2022-10-05 DIAGNOSIS — R79.89 LOW TSH LEVEL: Primary | ICD-10-CM

## 2022-10-05 DIAGNOSIS — E05.00 GRAVES DISEASE: ICD-10-CM

## 2022-10-05 DIAGNOSIS — D63.1 ANEMIA DUE TO STAGE 4 CHRONIC KIDNEY DISEASE (HCC): ICD-10-CM

## 2022-10-05 PROCEDURE — G8536 NO DOC ELDER MAL SCRN: HCPCS | Performed by: INTERNAL MEDICINE

## 2022-10-05 PROCEDURE — 1123F ACP DISCUSS/DSCN MKR DOCD: CPT | Performed by: INTERNAL MEDICINE

## 2022-10-05 PROCEDURE — 1101F PT FALLS ASSESS-DOCD LE1/YR: CPT | Performed by: INTERNAL MEDICINE

## 2022-10-05 PROCEDURE — G8419 CALC BMI OUT NRM PARAM NOF/U: HCPCS | Performed by: INTERNAL MEDICINE

## 2022-10-05 PROCEDURE — G8754 DIAS BP LESS 90: HCPCS | Performed by: INTERNAL MEDICINE

## 2022-10-05 PROCEDURE — G8427 DOCREV CUR MEDS BY ELIG CLIN: HCPCS | Performed by: INTERNAL MEDICINE

## 2022-10-05 PROCEDURE — 1090F PRES/ABSN URINE INCON ASSESS: CPT | Performed by: INTERNAL MEDICINE

## 2022-10-05 PROCEDURE — G8753 SYS BP > OR = 140: HCPCS | Performed by: INTERNAL MEDICINE

## 2022-10-05 PROCEDURE — G8432 DEP SCR NOT DOC, RNG: HCPCS | Performed by: INTERNAL MEDICINE

## 2022-10-05 PROCEDURE — 99214 OFFICE O/P EST MOD 30 MIN: CPT | Performed by: INTERNAL MEDICINE

## 2022-10-05 NOTE — PROGRESS NOTES
Chief Complaint   Patient presents with    Thyroid Problem    Follow-up     History of Present Illness: Diana Bentley is a 76 y.o. female with a past medical history significant for type 2 diabetes and peripheral vascular disease seen in referral from Lowell Goldberg MD for discussion related to Graves' disease. Initial visit:  Fatoumata Brito reports that she has been eating \" constantly\" but despite this she has been losing weight. Started taking methimazole 10 mg once daily in April. Reports that both insulin and sulfonylurea therapy were recently discontinued due to morning hypoglycemia. Endorses severe diarrhea that wakes her up from sleep. Denies tremor currently. There is no known family history of thyroid dysfunction. Her son lives nearby and helps her out. Does endorse dry eyes. 10/05/2022:Dropped to 5mg daily MMI in June- was instructed to have labs done 2 weeks later, never done. Lowell Goldberg MD moved, Trenton Catherineblanche DO is new PCP- seeing her in December. Lydia Bay MD. BG was 70 this AM- nothing >100. Continues to lose weight. Lowell Goldberg MD ordered a hemoccoult that was never done. Has received IV iron with nephrology. Current Outpatient Medications   Medication Sig    amLODIPine (NORVASC) 10 mg tablet TAKE 1 TABLET EVERY DAY    glimepiride (AMARYL) 1 mg tablet Take 1 Tablet by mouth Daily (before breakfast). gabapentin (NEURONTIN) 100 mg capsule TAKE 1 CAPSULE TWICE DAILY (MAX DAILY AMOUNT: 200MG)    alcohol swabs (BD Single Use Swabs Regular) padm USE AS DIRECTED    cloNIDine HCL (CATAPRES) 0.2 mg tablet Take 1 Tablet by mouth two (2) times a day. apixaban (Eliquis) 2.5 mg tablet Take 1 Tablet by mouth two (2) times a day. methIMAzole (TAPAZOLE) 5 mg tablet Take 0.5 Tablets by mouth daily.     True Metrix Glucose Meter monitoring kit USE AS DIRECTED    atorvastatin (LIPITOR) 10 mg tablet TAKE 1 TABLET EVERY DAY    furosemide (LASIX) 40 mg tablet Take 1 Tablet by mouth daily. cholecalciferol (VITAMIN D3) (2,000 UNITS /50 MCG) cap capsule TAKE 1 CAPSULE EVERY DAY    lancets (TRUEplus Lancets) 33 gauge misc TEST BLOOD SUGAR THREE TIMES DAILY    Aspirin Low Dose 81 mg tablet TAKE 1 TABLET BY MOUTH DAILY    sodium bicarbonate 650 mg tablet Take 650 mg by mouth two (2) times a day. glucose blood VI test strips (True Metrix Glucose Test Strip) strip TEST BLOOD SUGAR ON AVERAGE 3 TIMES DAILY. losartan (COZAAR) 25 mg tablet TAKE 1 TABLET BY MOUTH EVERY DAY    carvediloL (COREG) 25 mg tablet Take 25 mg by mouth two (2) times a day.    gum/pva/dental adhesive (SUPER DENTURE ADHESIVE DT)     lancets (PRODIGY TWIST TOP LANCET) 28 gauge misc To measure blood sugar 3-4 times daily. Dx code E 11.51, Z79.4 (Patient not taking: Reported on 10/5/2022)     No current facility-administered medications for this visit. Social Hx: son here with her Jay    Review of Systems:  See HPI    Physical Examination:  Visit Vitals  BP (!) 145/59   Pulse 76   Resp 16   Ht 5' 5.5\" (1.664 m)   Wt 97 lb 6.4 oz (44.2 kg)   LMP  (LMP Unknown)   SpO2 100%   BMI 15.96 kg/m²       - GENERAL: NCAT, Appears well nourished   - EYES: EOMI, non-icteric, no proptosis   - Ear/Nose/Throat: NCAT, no visible inflammation or masses   - CARDIOVASCULAR: no cyanosis, no visible JVD   - RESPIRATORY: respiratory effort normal without any distress or labored breathing   - MUSCULOSKELETAL: Normal ROM of neck and upper extremities observed   - SKIN: No rash on face  - NEUROLOGIC:  No facial asymmetry (Cranial nerve 7 motor function), No gaze palsy   - PSYCHIATRIC: Normal affect, Normal insight and judgement     Data Reviewed:             Assessment/Plan: This is a very pleasant 77-year-old female seen in referral from Yoana Chopra MD for discussion related to new onset Graves' disease. Started on MMI 10mg once daily, dropped to 5mg once daily as of June 2022.  1 month ago dropped to 2.5mg, need repeat labs. Continues to lose weight despite relative biochemical Euthyroidism. Repeat labs are pending. I am concerned we are missing a GI etiology to her weight loss- hemoccoult was ordered by PCP who has left the area and was never done- at time of presentation had diarrhea that was waking her form sleep. Will order comprehensive workup for weight loss but likely needs upper endo and colo. Discussed the risks of agranulocytosis and hepatotoxicity on MMI with the patient and her son. #Grave's disease  -MMI 10mg-->5mg, 2.5mg as of late Aug early Sept 2022- relative Euthyroidism- repeat now, weight loss is in excess of what I would expect with thyroid function tests in June  -Attempt to down titrate  -Reviewed the risk of agranulocytosis and hepatotoxicity  -Provided American thyroid Association handout on Graves' disease  - TSH 3RD GENERATION; Future  - T4, FREE; Future  - T3 TOTAL; Future    2. Weight loss, diarrhea, anemia  -hemoccoult was never completed with PCP- needs to be done or GI consult  - CBC WITH AUTOMATED DIFF; Future  - METABOLIC PANEL, COMPREHENSIVE; Future  - PROTEIN ELECTROPHORESIS; Future  - PROTEIN ELECTROPHORESIS, URINE RANDOM; Future    3. DMII with CKDIV  -can drop glimepiride to 1/2 tab if BG is <70 as in CKD this has high risk of lows    4. Anemia due to stage 4 chronic kidney disease ([address]  - IRON PROFILE;  Future  S/p iron infusions      Copy sent to:MD Efra Garduno, [facility] Diabetes & Endocrinology

## 2022-10-05 NOTE — Clinical Note
Hi there- see note- needs hemoccoult as weight loss is outside what I would expect with current TFTs, probably do not want to delay meeting her until the end of Nov

## 2022-10-24 ENCOUNTER — HOSPITAL ENCOUNTER (OUTPATIENT)
Dept: INFUSION THERAPY | Age: 77
Discharge: HOME | End: 2022-10-24
Payer: MEDICARE

## 2022-10-24 VITALS
HEART RATE: 65 BPM | DIASTOLIC BLOOD PRESSURE: 63 MMHG | SYSTOLIC BLOOD PRESSURE: 145 MMHG | RESPIRATION RATE: 18 BRPM | TEMPERATURE: 98.6 F

## 2022-10-24 LAB
ALBUMIN SERPL-MCNC: 3 G/DL (ref 3.5–5)
ANION GAP SERPL CALC-SCNC: 8 MMOL/L (ref 5–15)
BUN SERPL-MCNC: 60 MG/DL (ref 6–20)
BUN/CREAT SERPL: 22 (ref 12–20)
CALCIUM SERPL-MCNC: 8.2 MG/DL (ref 8.5–10.1)
CALCIUM SERPL-MCNC: 8.3 MG/DL (ref 8.5–10.1)
CHLORIDE SERPL-SCNC: 117 MMOL/L (ref 97–108)
CO2 SERPL-SCNC: 22 MMOL/L (ref 21–32)
CREAT SERPL-MCNC: 2.79 MG/DL (ref 0.55–1.02)
ERYTHROCYTE [DISTWIDTH] IN BLOOD BY AUTOMATED COUNT: 17.2 % (ref 11.5–14.5)
FERRITIN SERPL-MCNC: 255 NG/ML (ref 26–388)
GLUCOSE SERPL-MCNC: 121 MG/DL (ref 65–100)
HCT VFR BLD AUTO: 33.3 % (ref 35–47)
HGB BLD-MCNC: 10.5 G/DL (ref 11.5–16)
MCH RBC QN AUTO: 27.9 PG (ref 26–34)
MCHC RBC AUTO-ENTMCNC: 31.5 G/DL (ref 30–36.5)
MCV RBC AUTO: 88.6 FL (ref 80–99)
NRBC # BLD: 0 K/UL (ref 0–0.01)
NRBC BLD-RTO: 0 PER 100 WBC
PHOSPHATE SERPL-MCNC: 4.1 MG/DL (ref 2.6–4.7)
PLATELET # BLD AUTO: 225 K/UL (ref 150–400)
PMV BLD AUTO: 10.9 FL (ref 8.9–12.9)
POTASSIUM SERPL-SCNC: 4.7 MMOL/L (ref 3.5–5.1)
PTH-INTACT SERPL-MCNC: 433.2 PG/ML (ref 18.4–88)
RBC # BLD AUTO: 3.76 M/UL (ref 3.8–5.2)
SODIUM SERPL-SCNC: 147 MMOL/L (ref 136–145)
WBC # BLD AUTO: 9.6 K/UL (ref 3.6–11)

## 2022-10-24 PROCEDURE — 83970 ASSAY OF PARATHORMONE: CPT

## 2022-10-24 PROCEDURE — 82728 ASSAY OF FERRITIN: CPT

## 2022-10-24 PROCEDURE — 85027 COMPLETE CBC AUTOMATED: CPT

## 2022-10-24 PROCEDURE — 80069 RENAL FUNCTION PANEL: CPT

## 2022-10-24 PROCEDURE — 36415 COLL VENOUS BLD VENIPUNCTURE: CPT

## 2022-10-24 NOTE — PROGRESS NOTES
[facility] Progress Note    Date: October 24, 2022        0830: Pt arrived ambulatory to [facility] for [facility] in stable condition. Assessment completed. Labs drawn peripherally from the left antecubital and sent for processing. Labs reviewed. Criteria for treatment was not met. Patient HGB was 10.5 and HCT 33.3. Per the order, we are to hold for HCT >33    Visit Vitals  BP (!) 145/63 (BP 1 Location: Left arm, BP Patient Position: Sitting)   Pulse 65   Temp 98.6 °F (37 °C)   Resp 18   Breastfeeding No        Ms. Berger was discharged from [place] [address] in stable condition. Patient is aware of next scheduled [facility] appointment.     Future Appointments   Date Time Provider Inge Aburto   11/9/2022 12:30 PM [place], MD RDE [facility] BS AMB   11/28/2022 10:30 AM Emery Jimenez DO [facility] BS AMB   12/5/2022  8:00 AM H2 [facility] [address], RN  October 24, 2022

## 2022-10-28 DIAGNOSIS — I10 ESSENTIAL HYPERTENSION: ICD-10-CM

## 2022-10-28 RX ORDER — CLONIDINE HYDROCHLORIDE 0.2 MG/1
0.2 TABLET ORAL 2 TIMES DAILY
Qty: 180 TABLET | Refills: 1 | Status: CANCELLED | OUTPATIENT
Start: 2022-10-28

## 2022-10-28 NOTE — TELEPHONE ENCOUNTER
Writer sent pt a message via [facility][note 3].     ------------------------------------------------------------  Duplicate request: Catapres 0.2 mg #180 with 1 refill was sent to [facility] on 09/09/2022. For Pharmacy Admin Tracking Only    Intervention Detail: Discontinued Rx: 1, reason: Duplicate Therapy  Time Spent (min): 5      Requested Prescriptions     Pending Prescriptions Disp Refills    cloNIDine HCL (CATAPRES) 0.2 mg tablet 180 Tablet 1     Sig: Take 1 Tablet by mouth two (2) times a day.

## 2022-10-28 NOTE — TELEPHONE ENCOUNTER
Pt  son called in need a refill on the medication for his mom, pt has an appt with new PCP on Nov 28 need courtesy refill until she get established.

## 2022-11-03 DIAGNOSIS — I10 ESSENTIAL HYPERTENSION: ICD-10-CM

## 2022-11-03 RX ORDER — CLONIDINE HYDROCHLORIDE 0.2 MG/1
0.2 TABLET ORAL 2 TIMES DAILY
Qty: 180 TABLET | Refills: 0 | Status: SHIPPED | OUTPATIENT
Start: 2022-11-03

## 2022-11-03 NOTE — TELEPHONE ENCOUNTER
[facility] is requesting a new prescription for the Catapres on behalf of the pt via fax. Previous prescription was sent to [facility]. Last visit 09/09/2022 MD Jenniffer Baxter   Next appointment 11/28/2022 MD Pino Hazel   Previous refill encounter(s)   09/09/2022 Catapres #180 with 1 refill. For Pharmacy Admin Tracking Only    Intervention Detail: New Rx: 1, reason: Patient Preference  Time Spent (min): 5      Requested Prescriptions     Pending Prescriptions Disp Refills    cloNIDine HCL (CATAPRES) 0.2 mg tablet 180 Tablet 0     Sig: Take 1 Tablet by mouth two (2) times a day.

## 2022-11-09 ENCOUNTER — VIRTUAL VISIT (OUTPATIENT)
Dept: ENDOCRINOLOGY | Age: 77
End: 2022-11-09
Payer: MEDICARE

## 2022-11-09 DIAGNOSIS — E05.00 GRAVES DISEASE: Primary | ICD-10-CM

## 2022-11-09 DIAGNOSIS — E11.51 CONTROLLED TYPE 2 DIABETES MELLITUS WITH DIABETIC PERIPHERAL ANGIOPATHY WITHOUT GANGRENE, WITH LONG-TERM CURRENT USE OF INSULIN (HCC): ICD-10-CM

## 2022-11-09 DIAGNOSIS — E11.42 DIABETIC POLYNEUROPATHY ASSOCIATED WITH TYPE 2 DIABETES MELLITUS (HCC): ICD-10-CM

## 2022-11-09 DIAGNOSIS — Z79.4 CONTROLLED TYPE 2 DIABETES MELLITUS WITH DIABETIC PERIPHERAL ANGIOPATHY WITHOUT GANGRENE, WITH LONG-TERM CURRENT USE OF INSULIN (HCC): ICD-10-CM

## 2022-11-09 DIAGNOSIS — E11.21 DIABETIC NEPHROPATHY ASSOCIATED WITH TYPE 2 DIABETES MELLITUS (HCC): ICD-10-CM

## 2022-11-09 PROCEDURE — G8419 CALC BMI OUT NRM PARAM NOF/U: HCPCS | Performed by: INTERNAL MEDICINE

## 2022-11-09 PROCEDURE — G8432 DEP SCR NOT DOC, RNG: HCPCS | Performed by: INTERNAL MEDICINE

## 2022-11-09 PROCEDURE — 1090F PRES/ABSN URINE INCON ASSESS: CPT | Performed by: INTERNAL MEDICINE

## 2022-11-09 PROCEDURE — G8427 DOCREV CUR MEDS BY ELIG CLIN: HCPCS | Performed by: INTERNAL MEDICINE

## 2022-11-09 PROCEDURE — 99214 OFFICE O/P EST MOD 30 MIN: CPT | Performed by: INTERNAL MEDICINE

## 2022-11-09 PROCEDURE — 1123F ACP DISCUSS/DSCN MKR DOCD: CPT | Performed by: INTERNAL MEDICINE

## 2022-11-09 PROCEDURE — 3051F HG A1C>EQUAL 7.0%<8.0%: CPT | Performed by: INTERNAL MEDICINE

## 2022-11-09 PROCEDURE — 1101F PT FALLS ASSESS-DOCD LE1/YR: CPT | Performed by: INTERNAL MEDICINE

## 2022-11-09 PROCEDURE — G8756 NO BP MEASURE DOC: HCPCS | Performed by: INTERNAL MEDICINE

## 2022-11-09 PROCEDURE — G8536 NO DOC ELDER MAL SCRN: HCPCS | Performed by: INTERNAL MEDICINE

## 2022-11-09 RX ORDER — GLIMEPIRIDE 1 MG/1
0.5 TABLET ORAL
Qty: 90 TABLET | Refills: 1
Start: 2022-11-09 | End: 2022-11-25 | Stop reason: SDUPTHER

## 2022-11-09 NOTE — PROGRESS NOTES
Chief Complaint   Patient presents with    Follow-up    Thyroid Problem     History of Present Illness: Elvira Rodriguez is a 76 y.o. female with a past medical history significant for type 2 diabetes and peripheral vascular disease seen in referral from Randye Severe, MD for discussion related to Graves' disease. Initial visit:  Vickie Prasad reports that she has been eating \" constantly\" but despite this she has been losing weight. Started taking methimazole 10 mg once daily in April. Reports that both insulin and sulfonylurea therapy were recently discontinued due to morning hypoglycemia. Endorses severe diarrhea that wakes her up from sleep. Denies tremor currently. There is no known family history of thyroid dysfunction. Her son lives nearby and helps her out. Does endorse dry eyes. 10/05/2022:Dropped to 5mg daily MMI in June- was instructed to have labs done 2 weeks later, never done. Randye Severe, MD moved, Woodford Holter DO is new PCP- seeing her in December. Pk Fam MD. BG was 70 this AM- nothing >100. Continues to lose weight. Randye Severe, MD ordered a hemoccoult that was never done. Has received IV iron with nephrology. 11/09/2022: BG 88 this AM, 67 yesterday AM, 77, 74, 88, 82, 100, 82, 88, 74, 77, 88. MD Elizabeth- said didn't need iron. Appetite is down at lunch. Continues to have diarrhea. Trying to eat snack between 10/10:30, again 2:30-3, 7:30-8. Protein shakes, jello, wheat thins and cheese. Current Outpatient Medications   Medication Sig    cloNIDine HCL (CATAPRES) 0.2 mg tablet Take 1 Tablet by mouth two (2) times a day. amLODIPine (NORVASC) 10 mg tablet TAKE 1 TABLET EVERY DAY    glimepiride (AMARYL) 1 mg tablet Take 1 Tablet by mouth Daily (before breakfast).     gabapentin (NEURONTIN) 100 mg capsule TAKE 1 CAPSULE TWICE DAILY (MAX DAILY AMOUNT: 200MG)    alcohol swabs (BD Single Use Swabs Regular) padm USE AS DIRECTED    apixaban (Eliquis) 2.5 mg tablet Take 1 Tablet by mouth two (2) times a day. methIMAzole (TAPAZOLE) 5 mg tablet Take 0.5 Tablets by mouth daily. True Metrix Glucose Meter monitoring kit USE AS DIRECTED    atorvastatin (LIPITOR) 10 mg tablet TAKE 1 TABLET EVERY DAY    furosemide (LASIX) 40 mg tablet Take 1 Tablet by mouth daily. cholecalciferol (VITAMIN D3) (2,000 UNITS /50 MCG) cap capsule TAKE 1 CAPSULE EVERY DAY    lancets (TRUEplus Lancets) 33 gauge misc TEST BLOOD SUGAR THREE TIMES DAILY    Aspirin Low Dose 81 mg tablet TAKE 1 TABLET BY MOUTH DAILY    sodium bicarbonate 650 mg tablet Take 650 mg by mouth two (2) times a day. glucose blood VI test strips (True Metrix Glucose Test Strip) strip TEST BLOOD SUGAR ON AVERAGE 3 TIMES DAILY. losartan (COZAAR) 25 mg tablet TAKE 1 TABLET BY MOUTH EVERY DAY    carvediloL (COREG) 25 mg tablet Take 25 mg by mouth two (2) times a day.    gum/pva/dental adhesive (SUPER DENTURE ADHESIVE DT)     lancets (PRODIGY TWIST TOP LANCET) 28 gauge misc To measure blood sugar 3-4 times daily. Dx code E 11.51, Z79.4 (Patient not taking: No sig reported)     No current facility-administered medications for this visit.      Social Hx: son here with her Jay    Review of Systems:  See HPI    Physical Examination:  Visit Vitals  LMP  (LMP Unknown)       - GENERAL: Oscar Nguyen well nourished   - EYES: EOMI, non-icteric, no proptosis   - Ear/Nose/Throat: NCAT, no visible inflammation or masses   - CARDIOVASCULAR: no cyanosis, no visible JVD   - RESPIRATORY: respiratory effort normal without any distress or labored breathing   - MUSCULOSKELETAL: Normal ROM of neck and upper extremities observed   - SKIN: No rash on face  - NEUROLOGIC:  No facial asymmetry (Cranial nerve 7 motor function), No gaze palsy   - PSYCHIATRIC: Normal affect, Normal insight and judgement     Data Reviewed:    Latest Reference Range & Units 10/5/22 12:50   T4, Free 0.8 - 1.5 NG/DL 1.0   T3, total 71 - 180 ng/dL 75   TSH 0.36 - 3.74 uIU/mL 0.55        Latest Reference Range & Units 10/5/22 12:50 10/24/22 08:35   BUN 6 - 20 MG/DL 47 (H) 60 (H)   Creatinine 0.55 - 1.02 MG/DL 2.37 (H) 2.79 (H)   (H): Data is abnormally high  Assessment/Plan: This is a very pleasant 58-year-old female seen in referral from Gloria Wong MD for discussion related to new onset Graves' disease. Started on MMI 10mg once daily, dropped to 5mg once daily as of June 2022. September 2022, month ago dropped to 2.5mg, labs stable on this dose as of October. Continues to lose weight despite relative biochemical Euthyroidism. Repeat labs are pending. I am concerned we are missing a GI etiology to her weight loss- hemoccoult was ordered by PCP who has left the area and was never done- at time of presentation had diarrhea that was waking her form sleep. Likely needs upper endo and colo. Discussed the risks of agranulocytosis and hepatotoxicity on MMI with the patient and her son. #Grave's disease  -MMI 10mg-->5mg, 2.5mg as of late Aug early Sept 2022/stable as of November 2022- relative Euthyroidism- weight loss is in excess of what I would expect with thyroid function tests in June  -Reviewed the risk of agranulocytosis and hepatotoxicity  -Provided American thyroid Association handout on Graves' disease  - TSH 3RD GENERATION; Future  - T4, FREE; Future  - T3 TOTAL; Future    2. Weight loss, diarrhea, anemia  -hemoccoult was never completed with PCP- needs to be done or GI consult  -Possibly related to uremia, BUN is now in the 60s    3.  DMII with CKDIV  -can drop glimepiride to 1/2 tab   -Check 2 hours postprandially to ensure not above 180      Copy sent to:MD Demar Palomares [place] [facility] Diabetes & Endocrinology

## 2022-11-21 ENCOUNTER — APPOINTMENT (OUTPATIENT)
Dept: INFUSION THERAPY | Age: 77
End: 2022-11-21

## 2022-12-12 ENCOUNTER — OFFICE VISIT (OUTPATIENT)
Dept: PRIMARY CARE CLINIC | Age: 77
End: 2022-12-12
Payer: MEDICARE

## 2022-12-12 VITALS
HEART RATE: 57 BPM | WEIGHT: 122.8 LBS | SYSTOLIC BLOOD PRESSURE: 119 MMHG | HEIGHT: 66 IN | DIASTOLIC BLOOD PRESSURE: 72 MMHG | RESPIRATION RATE: 16 BRPM | BODY MASS INDEX: 19.73 KG/M2 | OXYGEN SATURATION: 97 % | TEMPERATURE: 97.8 F

## 2022-12-12 DIAGNOSIS — N18.4 CKD (CHRONIC KIDNEY DISEASE) STAGE 4, GFR 15-29 ML/MIN (HCC): ICD-10-CM

## 2022-12-12 DIAGNOSIS — Z79.4 CONTROLLED TYPE 2 DIABETES MELLITUS WITH DIABETIC PERIPHERAL ANGIOPATHY WITHOUT GANGRENE, WITH LONG-TERM CURRENT USE OF INSULIN (HCC): ICD-10-CM

## 2022-12-12 DIAGNOSIS — E11.51 CONTROLLED TYPE 2 DIABETES MELLITUS WITH DIABETIC PERIPHERAL ANGIOPATHY WITHOUT GANGRENE, WITH LONG-TERM CURRENT USE OF INSULIN (HCC): ICD-10-CM

## 2022-12-12 DIAGNOSIS — S78.112A UNILATERAL AKA, LEFT (HCC): ICD-10-CM

## 2022-12-12 DIAGNOSIS — R63.4 WEIGHT LOSS: ICD-10-CM

## 2022-12-12 DIAGNOSIS — R19.7 DIARRHEA, UNSPECIFIED TYPE: Primary | ICD-10-CM

## 2022-12-12 PROCEDURE — 1101F PT FALLS ASSESS-DOCD LE1/YR: CPT | Performed by: FAMILY MEDICINE

## 2022-12-12 PROCEDURE — 1090F PRES/ABSN URINE INCON ASSESS: CPT | Performed by: FAMILY MEDICINE

## 2022-12-12 PROCEDURE — 99214 OFFICE O/P EST MOD 30 MIN: CPT | Performed by: FAMILY MEDICINE

## 2022-12-12 PROCEDURE — 3074F SYST BP LT 130 MM HG: CPT | Performed by: FAMILY MEDICINE

## 2022-12-12 PROCEDURE — G8536 NO DOC ELDER MAL SCRN: HCPCS | Performed by: FAMILY MEDICINE

## 2022-12-12 PROCEDURE — 3051F HG A1C>EQUAL 7.0%<8.0%: CPT | Performed by: FAMILY MEDICINE

## 2022-12-12 PROCEDURE — 3078F DIAST BP <80 MM HG: CPT | Performed by: FAMILY MEDICINE

## 2022-12-12 PROCEDURE — G8754 DIAS BP LESS 90: HCPCS | Performed by: FAMILY MEDICINE

## 2022-12-12 PROCEDURE — G8510 SCR DEP NEG, NO PLAN REQD: HCPCS | Performed by: FAMILY MEDICINE

## 2022-12-12 PROCEDURE — G8427 DOCREV CUR MEDS BY ELIG CLIN: HCPCS | Performed by: FAMILY MEDICINE

## 2022-12-12 PROCEDURE — G8752 SYS BP LESS 140: HCPCS | Performed by: FAMILY MEDICINE

## 2022-12-12 PROCEDURE — 1123F ACP DISCUSS/DSCN MKR DOCD: CPT | Performed by: FAMILY MEDICINE

## 2022-12-12 PROCEDURE — G8420 CALC BMI NORM PARAMETERS: HCPCS | Performed by: FAMILY MEDICINE

## 2022-12-12 NOTE — PROGRESS NOTES
Chief Complaint   Patient presents with    Establish Care     Visit Vitals  /72 (BP 1 Location: Left upper arm, BP Patient Position: Sitting, BP Cuff Size: Small adult)   Pulse (!) 57   Temp 97.8 °F (36.6 °C) (Temporal)   Resp 16   Ht 5' 5.5\" (1.664 m)   Wt 97 lb (44 kg)   SpO2 97%   BMI 15.90 kg/m²     1. \"Have you been to the ER, urgent care clinic since your last visit? Hospitalized since your last visit? \" no    2. \"Have you seen or consulted any other health care providers outside of the [address] [place] [address] since your last visit? \"Endocrinology and Nephrology

## 2022-12-12 NOTE — PROGRESS NOTES
HPI     Chief Complaint   Patient presents with    Establish Care    Weight Loss     Thyroid was checked by Endocrinology     She is a 76 y.o. female who presents to establish care. Establishing Care    Pmhx : DM2 with peripheral neuropathy,CKD4, PVD, MVR, Hyperthyroidism, Osteoporosis, CKD2, Hx CVA, HTN, s/p Left AKA. Hyperthyroidism, DM2. Following with Dr Tim mehta. Has stabilized. C/o weight loss over the past 6 months. Unable to quantify this. Having chronic intermittent loose stool 2-3 times per day. No signs of bleeding. Appetite and PO intake is good. CKD4, Following with nephro, Dr Michael Manzano. - Chronic medical problems:  Past Medical History:   Diagnosis Date    Cerebrovascular accident (CVA) due to embolism of cerebral artery ([facility] 6/26/2020    Diabetes ([facility]     Hypertension     Peripheral vascular disease due to secondary diabetes [facility] 1/28/2019     - Current medications:   Current Outpatient Medications   Medication Sig    glimepiride (AMARYL) 1 mg tablet Take 0.5 Tablets by mouth Daily (before breakfast). methIMAzole (TAPAZOLE) 5 mg tablet Take 0.5 Tablets by mouth daily. cloNIDine HCL (CATAPRES) 0.2 mg tablet Take 1 Tablet by mouth two (2) times a day. amLODIPine (NORVASC) 10 mg tablet TAKE 1 TABLET EVERY DAY    gabapentin (NEURONTIN) 100 mg capsule TAKE 1 CAPSULE TWICE DAILY (MAX DAILY AMOUNT: 200MG)    alcohol swabs (BD Single Use Swabs Regular) padm USE AS DIRECTED    apixaban (Eliquis) 2.5 mg tablet Take 1 Tablet by mouth two (2) times a day. atorvastatin (LIPITOR) 10 mg tablet TAKE 1 TABLET EVERY DAY    furosemide (LASIX) 40 mg tablet Take 1 Tablet by mouth daily. cholecalciferol (VITAMIN D3) (2,000 UNITS /50 MCG) cap capsule TAKE 1 CAPSULE EVERY DAY    Aspirin Low Dose 81 mg tablet TAKE 1 TABLET BY MOUTH DAILY    sodium bicarbonate 650 mg tablet Take 650 mg by mouth two (2) times a day.     glucose blood VI test strips (True Metrix Glucose Test Strip) strip TEST BLOOD SUGAR ON AVERAGE 3 TIMES DAILY. losartan (COZAAR) 25 mg tablet TAKE 1 TABLET BY MOUTH EVERY DAY    carvediloL (COREG) 25 mg tablet Take 25 mg by mouth two (2) times a day.    gum/pva/dental adhesive (SUPER DENTURE ADHESIVE DT)     True Metrix Glucose Meter monitoring kit USE AS DIRECTED (Patient not taking: Reported on 12/12/2022)    lancets (TRUEplus Lancets) 33 gauge misc TEST BLOOD SUGAR THREE TIMES DAILY (Patient not taking: Reported on 12/12/2022)    lancets (PRODIGY TWIST TOP LANCET) 28 gauge misc To measure blood sugar 3-4 times daily. Dx code E 11.51, Z79.4 (Patient not taking: No sig reported)     No current facility-administered medications for this visit.      - Family history:   Family History   Problem Relation Age of Onset    Diabetes Mother     Diabetes Father     Heart Disease Father     Diabetes Brother     Parkinson's Disease Brother     Diabetes Brother     Diabetes Brother      - Allergies: No Known Allergies  - Surgical history:   Past Surgical History:   Procedure Laterality Date    HX ABOVE KNEE AMPUTATION  02/06/2017    HX BELOW KNEE AMPUTATION      HX GYN      hysterectomy     - Social history (sexually active, occupation, smoker, etoh use, etc):   Social History     Socioeconomic History    Marital status:      Spouse name: Not on file    Number of children: Not on file    Years of education: Not on file    Highest education level: Not on file   Occupational History    Not on file   Tobacco Use    Smoking status: Former     Years: 15.00     Types: Cigarettes    Smokeless tobacco: Never   Vaping Use    Vaping Use: Never used   Substance and Sexual Activity    Alcohol use: No    Drug use: No    Sexual activity: Not on file   Other Topics Concern    Not on file   Social History Narrative    Not on file     Social Determinants of Health     Financial Resource Strain: Unknown    Difficulty of Paying Living Expenses: Patient refused   Food Insecurity: Unknown    Worried About Running Out of Food in the Last Year: Patient refused    Ran Out of Food in the Last Year: Patient refused   Transportation Needs: Not on file   Physical Activity: Unknown    Days of Exercise per Week: Patient refused    Minutes of Exercise per Session: Patient refused   Stress: Not on file   Social Connections: Not on file   Intimate Partner Violence: Not At Risk    Fear of Current or Ex-Partner: No    Emotionally Abused: No    Physically Abused: No    Sexually Abused: No   Housing Stability: Not on file         Review of Systems  General : Denies fever, chills, unintentional weight loss. Cardiac : Denies chest pain, shortness of breath, orthopnea, PND. Pulm : Denies coughing, hemoptysis, dyspnea. GI: Denies abd pain, vomiting, change in bowel movements, black/bloody stool. Neuro : Denies syncope or presyncope. Denies focal neuro symptoms. Reviewed PmHx, RxHx, FmHx, SocHx, AllgHx and updated and dated in the chart. Physical Exam:  Visit Vitals  /72 (BP 1 Location: Left upper arm, BP Patient Position: Sitting, BP Cuff Size: Small adult)   Pulse (!) 57   Temp 97.8 °F (36.6 °C) (Temporal)   Resp 16   Ht 5' 5.5\" (1.664 m)   Wt 122 lb 12.8 oz (55.7 kg) Comment: several weeks   LMP  (LMP Unknown)   SpO2 97%   BMI 20.12 kg/m²     Physical Exam  Vitals reviewed. Constitutional:       Appearance: Normal appearance. HENT:      Head: Normocephalic and atraumatic. Cardiovascular:      Rate and Rhythm: Normal rate and regular rhythm. Pulses: Normal pulses. Heart sounds: Normal heart sounds. Pulmonary:      Effort: Pulmonary effort is normal.      Breath sounds: Normal breath sounds. Musculoskeletal:      Cervical back: Neck supple. No tenderness. Right lower leg: No edema. Comments: Left AKA   Neurological:      General: No focal deficit present. Mental Status: She is alert and oriented to person, place, and time.    Psychiatric:         Mood and Affect: Mood normal. Behavior: Behavior normal.         Thought Content: Thought content normal.            Assessment / Plan     Diagnoses and all orders for this visit:    1. Diarrhea, unspecified type  -     REFERRAL TO GASTROENTEROLOGY    2. Weight loss  -     AMB POC FECAL BLOOD, OCCULT, QL 3 CARDS  -     REFERRAL TO GASTROENTEROLOGY    3. Controlled type 2 diabetes mellitus with diabetic peripheral angiopathy without gangrene, with long-term current use of insulin ([address]    4. Unilateral AKA, left ([address]    5. CKD (chronic kidney disease) stage 4, GFR 15-29 ml/min (HCC)  Weight seems to have stabilized. Follow up within 2 months or sooner prn. Follow up with specialists as scheduled. Obtain medical records    I have discussed the diagnosis with the patient and the intended plan as seen in the above orders.         Monica Mcmanus, DO

## 2022-12-16 ENCOUNTER — TELEPHONE (OUTPATIENT)
Dept: PRIMARY CARE CLINIC | Age: 77
End: 2022-12-16

## 2022-12-16 LAB
HEMOCCULT STL QL: NEGATIVE
VALID INTERNAL CONTROL?: YES

## 2023-01-09 DIAGNOSIS — I42.8 OTHER CARDIOMYOPATHY (HCC): ICD-10-CM

## 2023-01-09 DIAGNOSIS — N18.32 CKD STAGE G3B/A2, GFR 30-44 AND ALBUMIN CREATININE RATIO 30-299 MG/G (HCC): ICD-10-CM

## 2023-01-10 RX ORDER — FUROSEMIDE 40 MG/1
40 TABLET ORAL DAILY
Qty: 90 TABLET | Refills: 0 | Status: SHIPPED | OUTPATIENT
Start: 2023-01-10

## 2023-01-10 NOTE — TELEPHONE ENCOUNTER
Pt has now established care with Dr. Rica Crane. Pt was previously a pt of Dr. Taya Lewis. Request was sent to [facility]. Writer will route this to Dr. Rica Crane for review. Last visit 12/12/2022 MD Garcia Sessions   Next appointment Nothing scheduled   Previous refill encounter(s)   07/01/2022 Lasix #90 with 1 refill. For Pharmacy Admin Tracking Only    Program: Medication Refill  Intervention Detail: New Rx: 1, reason: Patient Preference  Time Spent (min): 5        Requested Prescriptions     Pending Prescriptions Disp Refills    furosemide (LASIX) 40 mg tablet 90 Tablet 0     Sig: Take 1 Tablet by mouth daily.

## 2023-01-11 DIAGNOSIS — E11.42 DIABETIC POLYNEUROPATHY ASSOCIATED WITH TYPE 2 DIABETES MELLITUS (HCC): ICD-10-CM

## 2023-01-11 RX ORDER — ISOPROPYL ALCOHOL 70 ML/100ML
SWAB TOPICAL
Qty: 300 PAD | Refills: 3 | Status: SHIPPED | OUTPATIENT
Start: 2023-01-11

## 2023-01-11 RX ORDER — LANCETS 33 GAUGE
EACH MISCELLANEOUS
Qty: 300 LANCET | Refills: 3 | Status: SHIPPED | OUTPATIENT
Start: 2023-01-11

## 2023-01-11 NOTE — TELEPHONE ENCOUNTER
Pharmacy is also requesting lancets. Requested Prescriptions     Pending Prescriptions Disp Refills    lancets (TRUEplus Lancets) 33 gauge misc 300 Lancet 3     Sig: TEST BLOOD SUGAR ON AVERAGE 3 TIMES DAILY.      Signed Prescriptions Disp Refills    alcohol swabs (BD Single Use Swabs Regular) padm 300 Pad 3     Sig: USE AS DIRECTED     Authorizing Provider: Abdias Foster

## 2023-01-11 NOTE — TELEPHONE ENCOUNTER
Refill request fax received at [facility]. Routing to new PCP.     Requested Prescriptions     Pending Prescriptions Disp Refills    alcohol swabs (BD Single Use Swabs Regular) padm 300 Pad 3     Sig: USE AS DIRECTED           For Pharmacy Admin Tracking Only    Program: Medication Refill  Intervention Detail: New Rx: 2, reason: Patient Preference  Time Spent (min): 5

## 2023-01-31 DIAGNOSIS — E11.51 CONTROLLED TYPE 2 DIABETES MELLITUS WITH DIABETIC PERIPHERAL ANGIOPATHY WITHOUT GANGRENE, WITH LONG-TERM CURRENT USE OF INSULIN (HCC): ICD-10-CM

## 2023-01-31 DIAGNOSIS — Z79.4 CONTROLLED TYPE 2 DIABETES MELLITUS WITH DIABETIC PERIPHERAL ANGIOPATHY WITHOUT GANGRENE, WITH LONG-TERM CURRENT USE OF INSULIN (HCC): ICD-10-CM

## 2023-01-31 DIAGNOSIS — G62.9 NEUROPATHY: ICD-10-CM

## 2023-01-31 RX ORDER — GABAPENTIN 100 MG/1
CAPSULE ORAL
Qty: 180 CAPSULE | Refills: 1 | Status: CANCELLED | OUTPATIENT
Start: 2023-01-31

## 2023-01-31 NOTE — TELEPHONE ENCOUNTER
Pt has now established care with Dr. Jessica Bergeron. Pt is no longer a pt of [facility].      For Pharmacy Admin Tracking Only    Program: Medication Refill  Intervention Detail: Discontinued Rx: 1, reason: Duplicate Therapy  Time Spent (min): 5      Requested Prescriptions     Pending Prescriptions Disp Refills    gabapentin (NEURONTIN) 100 mg capsule 180 Capsule 1     Sig: TAKE 1 CAPSULE TWICE DAILY (MAX DAILY AMOUNT: 200MG)

## 2023-01-31 NOTE — TELEPHONE ENCOUNTER
Pharmacy requested. PCP: Pia Beckett DO    Last appt: 12/12/2022  No future appointments. Requested Prescriptions     Pending Prescriptions Disp Refills    glucose blood VI test strips (True Metrix Glucose Test Strip) strip 300 Strip 3     Sig: TEST BLOOD SUGAR ON AVERAGE 3 TIMES DAILY.        Prior labs and Blood pressures:  BP Readings from Last 3 Encounters:   12/12/22 119/72   10/24/22 (!) 145/63   10/05/22 (!) 145/59     Lab Results   Component Value Date/Time    Sodium 147 (H) 10/24/2022 08:35 AM    Potassium 4.7 10/24/2022 08:35 AM    Chloride 117 (H) 10/24/2022 08:35 AM    CO2 22 10/24/2022 08:35 AM    Anion gap 8 10/24/2022 08:35 AM    Glucose 121 (H) 10/24/2022 08:35 AM    BUN 60 (H) 10/24/2022 08:35 AM    Creatinine 2.79 (H) 10/24/2022 08:35 AM    BUN/Creatinine ratio 22 (H) 10/24/2022 08:35 AM    GFR est AA 22 (L) 04/29/2022 12:28 PM    GFR est non-AA 19 (L) 04/29/2022 12:28 PM    Calcium 8.3 (L) 10/24/2022 08:35 AM    Calcium 8.2 (L) 10/24/2022 08:35 AM

## 2023-02-03 RX ORDER — CALCIUM CITRATE/VITAMIN D3 200MG-6.25
TABLET ORAL
Qty: 300 STRIP | Refills: 3 | Status: SHIPPED | OUTPATIENT
Start: 2023-02-03

## 2023-02-10 NOTE — TELEPHONE ENCOUNTER
----- Message from Darrius Segura MD sent at 1/29/2019  4:47 PM EST -----  Please let pt know echo was overall normal.  No prior MI. thx  ----- Message -----  From: Froy Gomez MD  Sent: 1/29/2019   4:47 PM  To: Darrius Segura MD      Above echo results given to patient.   2 pt identifiers used SSKI Counseling:  I discussed with the patient the risks of SSKI including but not limited to thyroid abnormalities, metallic taste, GI upset, fever, headache, acne, arthralgias, paraesthesias, lymphadenopathy, easy bleeding, arrhythmias, and allergic reaction.

## 2023-03-29 ENCOUNTER — HOSPITAL ENCOUNTER (OUTPATIENT)
Dept: GENERAL RADIOLOGY | Age: 78
Discharge: HOME | End: 2023-03-29
Attending: FAMILY MEDICINE
Payer: MEDICARE

## 2023-03-29 ENCOUNTER — OFFICE VISIT (OUTPATIENT)
Dept: PRIMARY CARE CLINIC | Age: 78
End: 2023-03-29
Payer: MEDICARE

## 2023-03-29 VITALS
RESPIRATION RATE: 18 BRPM | SYSTOLIC BLOOD PRESSURE: 119 MMHG | HEART RATE: 54 BPM | BODY MASS INDEX: 19.29 KG/M2 | TEMPERATURE: 97.8 F | HEIGHT: 65 IN | OXYGEN SATURATION: 100 % | DIASTOLIC BLOOD PRESSURE: 55 MMHG | WEIGHT: 115.8 LBS

## 2023-03-29 DIAGNOSIS — M79.642 PAIN IN BOTH HANDS: ICD-10-CM

## 2023-03-29 DIAGNOSIS — M79.641 PAIN IN BOTH HANDS: ICD-10-CM

## 2023-03-29 DIAGNOSIS — E11.51 CONTROLLED TYPE 2 DIABETES MELLITUS WITH DIABETIC PERIPHERAL ANGIOPATHY WITHOUT GANGRENE, WITH LONG-TERM CURRENT USE OF INSULIN (HCC): Primary | ICD-10-CM

## 2023-03-29 DIAGNOSIS — E11.21 DIABETIC NEPHROPATHY ASSOCIATED WITH TYPE 2 DIABETES MELLITUS (HCC): ICD-10-CM

## 2023-03-29 DIAGNOSIS — M25.561 ACUTE PAIN OF RIGHT KNEE: ICD-10-CM

## 2023-03-29 DIAGNOSIS — Z89.612 S/P AKA (ABOVE KNEE AMPUTATION) UNILATERAL, LEFT (HCC): ICD-10-CM

## 2023-03-29 DIAGNOSIS — N18.4 CKD (CHRONIC KIDNEY DISEASE) STAGE 4, GFR 15-29 ML/MIN (HCC): ICD-10-CM

## 2023-03-29 DIAGNOSIS — R63.4 WEIGHT LOSS: ICD-10-CM

## 2023-03-29 DIAGNOSIS — Z79.4 CONTROLLED TYPE 2 DIABETES MELLITUS WITH DIABETIC PERIPHERAL ANGIOPATHY WITHOUT GANGRENE, WITH LONG-TERM CURRENT USE OF INSULIN (HCC): Primary | ICD-10-CM

## 2023-03-29 PROCEDURE — 3078F DIAST BP <80 MM HG: CPT | Performed by: FAMILY MEDICINE

## 2023-03-29 PROCEDURE — G8420 CALC BMI NORM PARAMETERS: HCPCS | Performed by: FAMILY MEDICINE

## 2023-03-29 PROCEDURE — 99214 OFFICE O/P EST MOD 30 MIN: CPT | Performed by: FAMILY MEDICINE

## 2023-03-29 PROCEDURE — 1101F PT FALLS ASSESS-DOCD LE1/YR: CPT | Performed by: FAMILY MEDICINE

## 2023-03-29 PROCEDURE — 73562 X-RAY EXAM OF KNEE 3: CPT

## 2023-03-29 PROCEDURE — 1123F ACP DISCUSS/DSCN MKR DOCD: CPT | Performed by: FAMILY MEDICINE

## 2023-03-29 PROCEDURE — G8510 SCR DEP NEG, NO PLAN REQD: HCPCS | Performed by: FAMILY MEDICINE

## 2023-03-29 PROCEDURE — G8536 NO DOC ELDER MAL SCRN: HCPCS | Performed by: FAMILY MEDICINE

## 2023-03-29 PROCEDURE — 73130 X-RAY EXAM OF HAND: CPT

## 2023-03-29 PROCEDURE — 3074F SYST BP LT 130 MM HG: CPT | Performed by: FAMILY MEDICINE

## 2023-03-29 PROCEDURE — G8427 DOCREV CUR MEDS BY ELIG CLIN: HCPCS | Performed by: FAMILY MEDICINE

## 2023-03-29 PROCEDURE — 1090F PRES/ABSN URINE INCON ASSESS: CPT | Performed by: FAMILY MEDICINE

## 2023-03-29 NOTE — PROGRESS NOTES
Twila Flores is an 76 y.o. female who presents for acute complaint. Pmhx : DM2 with peripheral neuropathy,CKD4, PVD, MVR, Hyperthyroidism, Osteoporosis, CKD2, Hx CVA, HTN, s/p Left AKA. Hyperthyroidism, DM2. Following with endo, Dr Darshana Shannon. C/o weight loss has continued. States she is eating well at home. No signs of bleeding. Normal BM. CKD4, Following with nephro, Dr Ld Vanessa. has not followed up in 6-7 mos. C/o right knee pain, and pain and swelling in bilat hand IP joints. Ongoing for several weeks. Allergies - reviewed:   No Known Allergies      Medications - reviewed:   Current Outpatient Medications   Medication Sig    glimepiride (AMARYL) 1 mg tablet Take 0.5 Tablets by mouth Daily (before breakfast). glucose blood VI test strips (True Metrix Glucose Test Strip) strip TEST BLOOD SUGAR ON AVERAGE 3 TIMES DAILY. gabapentin (NEURONTIN) 100 mg capsule TAKE 1 CAPSULE TWICE DAILY (MAX DAILY AMOUNT: 200MG)    alcohol swabs (BD Single Use Swabs Regular) padm USE AS DIRECTED    lancets (TRUEplus Lancets) 33 gauge misc TEST BLOOD SUGAR ON AVERAGE 3 TIMES DAILY. furosemide (LASIX) 40 mg tablet Take 1 Tablet by mouth daily. methIMAzole (TAPAZOLE) 5 mg tablet Take 0.5 Tablets by mouth daily. cloNIDine HCL (CATAPRES) 0.2 mg tablet Take 1 Tablet by mouth two (2) times a day. amLODIPine (NORVASC) 10 mg tablet TAKE 1 TABLET EVERY DAY    apixaban (Eliquis) 2.5 mg tablet Take 1 Tablet by mouth two (2) times a day. atorvastatin (LIPITOR) 10 mg tablet TAKE 1 TABLET EVERY DAY    cholecalciferol (VITAMIN D3) (2,000 UNITS /50 MCG) cap capsule TAKE 1 CAPSULE EVERY DAY    Aspirin Low Dose 81 mg tablet TAKE 1 TABLET BY MOUTH DAILY    sodium bicarbonate 650 mg tablet Take 650 mg by mouth two (2) times a day.     losartan (COZAAR) 25 mg tablet TAKE 1 TABLET BY MOUTH EVERY DAY    carvediloL (COREG) 25 mg tablet Take 25 mg by mouth two (2) times a day.    gum/pva/dental adhesive (SUPER DENTURE ADHESIVE DT)      No current facility-administered medications for this visit.          Past Medical History - reviewed:  Past Medical History:   Diagnosis Date    Cerebrovascular accident (CVA) due to embolism of cerebral artery ([facility] 6/26/2020    Diabetes ([facility]     Hypertension     Peripheral vascular disease due to secondary diabetes [facility] 1/28/2019         Past Surgical History - reviewed:   Past Surgical History:   Procedure Laterality Date    HX ABOVE KNEE AMPUTATION  02/06/2017    HX BELOW KNEE AMPUTATION      HX GYN      hysterectomy         Social History - reviewed:  Social History     Socioeconomic History    Marital status:      Spouse name: Not on file    Number of children: Not on file    Years of education: Not on file    Highest education level: Not on file   Occupational History    Not on file   Tobacco Use    Smoking status: Former     Years: 15.00     Types: Cigarettes    Smokeless tobacco: Never   Vaping Use    Vaping Use: Never used   Substance and Sexual Activity    Alcohol use: No    Drug use: No    Sexual activity: Not on file   Other Topics Concern    Not on file   Social History Narrative    Not on file     Social Determinants of Health     Financial Resource Strain: Unknown    Difficulty of Paying Living Expenses: Patient refused   Food Insecurity: Unknown    Worried About Running Out of Food in the Last Year: Patient refused    Ran Out of Food in the Last Year: Patient refused   Transportation Needs: Not on file   Physical Activity: Unknown    Days of Exercise per Week: Patient refused    Minutes of Exercise per Session: Patient refused   Stress: Not on file   Social Connections: Not on file   Intimate Partner Violence: Not At Risk    Fear of Current or Ex-Partner: No    Emotionally Abused: No    Physically Abused: No    Sexually Abused: No   Housing Stability: Not on file         Family History - reviewed:  Family History   Problem Relation Age of Onset    Diabetes Mother     Diabetes Father     Heart Disease Father     Diabetes Brother     Parkinson's Disease Brother     Diabetes Brother     Diabetes Brother          ROS  CONSTITUTIONAL: Denies fever, chills, unintentional weight loss. CARDIOVASCULAR: Denies chest pain, orthopnea, PND. RESPIRATORY: Denies cough, dyspnea, wheezing, hemoptysis. GI: Denies abdominal pain, diarrhea, constipation, diarrhea, black or bloody stool. Physical Exam  Visit Vitals  BP (!) 119/55 (BP 1 Location: Left upper arm, BP Patient Position: Sitting)   Pulse (!) 54   Temp 97.8 °F (36.6 °C)   Resp 18   Ht 5' 5\" (1.651 m)   Wt 115 lb 12.8 oz (52.5 kg)   LMP  (LMP Unknown)   SpO2 100%   BMI 19.27 kg/m²     Physical Exam  Vitals reviewed. Constitutional:       Appearance: Normal appearance. HENT:      Head: Normocephalic and atraumatic. Cardiovascular:      Rate and Rhythm: Normal rate and regular rhythm. Pulses: Normal pulses. Pulmonary:      Effort: Pulmonary effort is normal.      Breath sounds: Normal breath sounds. Musculoskeletal:      Comments: Bilat hand IP joints with effusion, tenderness. No erythema or warmth. Righe knee with tenderness just distal to medial joint line. Neurological:      General: No focal deficit present. Mental Status: She is alert and oriented to person, place, and time. Psychiatric:         Mood and Affect: Mood normal.         Behavior: Behavior normal.         Thought Content: Thought content normal.         Assessment/Plan  Diagnoses and all orders for this visit:    1. Controlled type 2 diabetes mellitus with diabetic peripheral angiopathy without gangrene, with long-term current use of insulin ([facility])  -     METABOLIC PANEL, COMPREHENSIVE; Future    2. Diabetic nephropathy associated with type 2 diabetes mellitus (HCC)  -     CBC WITH AUTOMATED DIFF; Future  -     METABOLIC PANEL, COMPREHENSIVE; Future    3. Pain in both hands  -     RA + CCP ABS;  Future  -     XR HAND RT MIN 3 V; Future  -     XR HAND LT MIN 3 V; Future    4. S/P AKA (above knee amputation) unilateral, left ([facility]    5. CKD (chronic kidney disease) stage 4, GFR 15-29 ml/min ([facility])    6. Acute pain of right knee  -     XR KNEE RT MAX 2 VWS; Future    7. Weight loss  -     REFERRAL TO GASTROENTEROLOGY    Due to Delay in getting in to GI will provide alternate referral.  May try applied ice, topical diclofenac. Provided handout for stretches for pes anserine bursitis. Advised follow up with endo and nephrology. I have discussed the diagnosis with the patient and the intended plan as seen in the above orders. Patient verbalized understanding of the plan and agrees with the plan. I have discussed medication side effects and warnings with the patient as well. Informed patient to return to the office if new symptoms arise.         Fran Mullen, DO

## 2023-03-31 NOTE — PROGRESS NOTES
Called and discussed results with the patient and her son. Advised close follow up with her nephrologist. They are agreeable to this.  Declines rheum referral.

## 2023-04-17 DIAGNOSIS — N18.32 CKD STAGE G3B/A2, GFR 30-44 AND ALBUMIN CREATININE RATIO 30-299 MG/G (HCC): ICD-10-CM

## 2023-04-17 DIAGNOSIS — I42.8 OTHER CARDIOMYOPATHY (HCC): ICD-10-CM

## 2023-04-17 RX ORDER — FUROSEMIDE 40 MG/1
40 TABLET ORAL DAILY
Qty: 90 TABLET | Refills: 0 | Status: SHIPPED | OUTPATIENT
Start: 2023-04-17

## 2023-05-18 PROBLEM — D63.1 ANEMIA OF CHRONIC RENAL FAILURE, STAGE 4 (SEVERE) (HCC): Status: ACTIVE | Noted: 2023-05-18

## 2023-05-18 PROBLEM — N18.4 ANEMIA OF CHRONIC RENAL FAILURE, STAGE 4 (SEVERE) (HCC): Status: ACTIVE | Noted: 2023-05-18

## 2023-05-22 ENCOUNTER — HOSPITAL ENCOUNTER (OUTPATIENT)
Facility: HOSPITAL | Age: 78
Setting detail: INFUSION SERIES
End: 2023-05-22
Payer: COMMERCIAL

## 2023-05-22 VITALS
RESPIRATION RATE: 18 BRPM | DIASTOLIC BLOOD PRESSURE: 58 MMHG | TEMPERATURE: 97.5 F | HEART RATE: 80 BPM | SYSTOLIC BLOOD PRESSURE: 121 MMHG

## 2023-05-22 DIAGNOSIS — N18.4 ANEMIA OF CHRONIC RENAL FAILURE, STAGE 4 (SEVERE) (HCC): Primary | ICD-10-CM

## 2023-05-22 DIAGNOSIS — D63.1 ANEMIA OF CHRONIC RENAL FAILURE, STAGE 4 (SEVERE) (HCC): Primary | ICD-10-CM

## 2023-05-22 PROCEDURE — 2580000003 HC RX 258: Performed by: INTERNAL MEDICINE

## 2023-05-22 PROCEDURE — 96374 THER/PROPH/DIAG INJ IV PUSH: CPT

## 2023-05-22 PROCEDURE — 6360000002 HC RX W HCPCS: Performed by: INTERNAL MEDICINE

## 2023-05-22 RX ORDER — SODIUM CHLORIDE 9 MG/ML
5-250 INJECTION, SOLUTION INTRAVENOUS PRN
Status: CANCELLED | OUTPATIENT
Start: 2023-05-29

## 2023-05-22 RX ORDER — SODIUM CHLORIDE 9 MG/ML
5-250 INJECTION, SOLUTION INTRAVENOUS PRN
Status: DISCONTINUED | OUTPATIENT
Start: 2023-05-22 | End: 2023-05-23 | Stop reason: HOSPADM

## 2023-05-22 RX ADMIN — FERUMOXYTOL 510 MG: 510 INJECTION INTRAVENOUS at 08:29

## 2023-05-22 NOTE — PROGRESS NOTES
Jayson Hoffman Short Note                       Date: May 22, 2023    Name: Mary Faustin    MRN: [id number]         : 1945      0800 Pt admit to [facility] for Feraheme ambulatory in stable condition. Assessment completed. No new concerns voiced. PIV established L AC pos blood return noted      Ms. Smart's vitals were reviewed prior to and after treatment. Patient Vitals for the past 12 hrs:   Temp Pulse Resp BP   23 0900 -- -- -- (!) 121/58   23 0745 97.5 °F (36.4 °C) 80 18 125/68       Medications given:   Medications Administered         ferumoxytol (FERAHEME) 510 mg in sodium chloride 0.9 % 100 mL IVPB Admin Date  2023 Action  New Bag Dose  510 mg Rate  381 mL/hr Route  IntraVENous Administered By  Cheryl Kat RN        PIV removed    Ms. Smart tolerated the infusion, and had no complaints. Ms. Herlinda Miranda was discharged from [place] [address] in stable condition.      Future Appointments   Date Time Provider Jolly Chavez   2023  8:00 AM D1 TRINITY Reyna, RN  May 22, 2023  9:00 AM

## 2023-05-30 ENCOUNTER — HOSPITAL ENCOUNTER (OUTPATIENT)
Facility: HOSPITAL | Age: 78
Setting detail: INFUSION SERIES
End: 2023-05-30
Payer: COMMERCIAL

## 2023-05-30 VITALS
HEART RATE: 58 BPM | DIASTOLIC BLOOD PRESSURE: 51 MMHG | TEMPERATURE: 98.4 F | SYSTOLIC BLOOD PRESSURE: 123 MMHG | RESPIRATION RATE: 18 BRPM

## 2023-05-30 DIAGNOSIS — D63.1 ANEMIA OF CHRONIC RENAL FAILURE, STAGE 4 (SEVERE) (HCC): Primary | ICD-10-CM

## 2023-05-30 DIAGNOSIS — N18.4 ANEMIA OF CHRONIC RENAL FAILURE, STAGE 4 (SEVERE) (HCC): Primary | ICD-10-CM

## 2023-05-30 PROCEDURE — 6360000002 HC RX W HCPCS: Performed by: INTERNAL MEDICINE

## 2023-05-30 PROCEDURE — 2580000003 HC RX 258: Performed by: INTERNAL MEDICINE

## 2023-05-30 PROCEDURE — 96365 THER/PROPH/DIAG IV INF INIT: CPT

## 2023-05-30 RX ORDER — SODIUM CHLORIDE 9 MG/ML
5-250 INJECTION, SOLUTION INTRAVENOUS PRN
Status: CANCELLED | OUTPATIENT
Start: 2023-06-05

## 2023-05-30 RX ORDER — SODIUM CHLORIDE 9 MG/ML
5-250 INJECTION, SOLUTION INTRAVENOUS PRN
Status: DISCONTINUED | OUTPATIENT
Start: 2023-05-30 | End: 2023-05-31 | Stop reason: HOSPADM

## 2023-05-30 RX ADMIN — FERUMOXYTOL 510 MG: 510 INJECTION INTRAVENOUS at 08:30

## 2023-05-30 RX ADMIN — SODIUM CHLORIDE 25 ML/HR: 9 INJECTION, SOLUTION INTRAVENOUS at 08:30

## 2023-05-30 NOTE — PROGRESS NOTES
[facility] Short Note                       Date: May 30, 2023    Name: Hillary Tomas    MRN: [id number]         : 1945      Pt admit to [facility] for [facility] 2/ via wheelchair in stable condition. Assessment completed and documented in flowsheets. No acute concerns at this time. No labs ordered/due. Ms. Eduardo Patrick vitals were reviewed prior to and after treatment. Patient Vitals for the past 12 hrs:   Temp Pulse Resp BP   23 0855 -- 58 18 (!) 123/51   23 0807 98.4 °F (36.9 °C) 60 18 (!) 124/56     Medications given: via PIV in L AC  Medications Administered         0.9 % sodium chloride infusion Admin Date  2023 Action  New Bag Dose  25 mL/hr Rate  25 mL/hr Route  IntraVENous Administered By  Alessio Bay RN        ferumoxytol Jay Campi) 510 mg in sodium chloride 0.9 % 100 mL IVPB Admin Date  2023 Action  New Bag Dose  510 mg Rate  381 mL/hr Route  IntraVENous Administered By  Alessio Bay RN            PIV positive blood return noted, flushed and removed prior to discharge. Ms. Lenard Landau tolerated the infusion, and had no complaints. Ms. Lenard Landau was discharged from [place] [address] in stable condition with assistance from son.     Alessio Bay RN  May 30, 2023  9:14 AM

## 2023-06-09 RX ORDER — METHIMAZOLE 5 MG/1
TABLET ORAL
Qty: 45 TABLET | Refills: 1 | Status: SHIPPED | OUTPATIENT
Start: 2023-06-09

## 2023-07-15 ENCOUNTER — PATIENT MESSAGE (OUTPATIENT)
Dept: PRIMARY CARE CLINIC | Facility: CLINIC | Age: 78
End: 2023-07-15

## 2023-07-17 RX ORDER — FUROSEMIDE 40 MG/1
TABLET ORAL
Qty: 90 TABLET | Refills: 0 | Status: SHIPPED | OUTPATIENT
Start: 2023-07-17 | End: 2023-07-20 | Stop reason: SDUPTHER

## 2023-07-17 NOTE — TELEPHONE ENCOUNTER
PCP: Chelsi Noriega DO    Last Visit 3/29/2023   No future appointments.     Requested Prescriptions     Pending Prescriptions Disp Refills    furosemide (LASIX) 40 MG tablet [Pharmacy Med Name: FUROSEMIDE 40MG TABLETS] 90 tablet 0     Sig: TAKE 1 TABLET BY MOUTH DAILY         Other Comments: Last Refill 01/10/2023

## 2023-07-20 RX ORDER — FUROSEMIDE 40 MG/1
40 TABLET ORAL DAILY
Qty: 90 TABLET | Refills: 0 | Status: SHIPPED | OUTPATIENT
Start: 2023-07-20

## 2023-07-20 NOTE — TELEPHONE ENCOUNTER
From: Gabino Victor  To: Dr. Johanna Castellanos: 7/15/2023 2:02 PM EDT  Subject: Medication Refill    Dr. Monty Gomez you are well. My mom, Gabino Victor, needs her Furosemide 40mg refilled. I think [facility] has tried to reach out to prescriber.      Thanks  Delwyn Closs

## 2023-09-08 RX ORDER — METHIMAZOLE 5 MG/1
TABLET ORAL
Qty: 45 TABLET | Refills: 1 | Status: SHIPPED | OUTPATIENT
Start: 2023-09-08

## 2023-09-25 RX ORDER — CLONIDINE HYDROCHLORIDE 0.2 MG/1
0.2 TABLET ORAL 2 TIMES DAILY
Qty: 60 TABLET | Refills: 0 | Status: SHIPPED | OUTPATIENT
Start: 2023-09-25

## 2023-10-06 RX ORDER — CLONIDINE HYDROCHLORIDE 0.2 MG/1
0.2 TABLET ORAL 2 TIMES DAILY
Qty: 180 TABLET | OUTPATIENT
Start: 2023-10-06

## 2023-11-30 ENCOUNTER — TELEPHONE (OUTPATIENT)
Age: 78
End: 2023-11-30

## 2023-12-01 RX ORDER — METHIMAZOLE 5 MG/1
2.5 TABLET ORAL DAILY
Qty: 45 TABLET | Refills: 1 | Status: SHIPPED | OUTPATIENT
Start: 2023-12-01

## 2023-12-09 ENCOUNTER — PATIENT MESSAGE (OUTPATIENT)
Dept: PRIMARY CARE CLINIC | Facility: CLINIC | Age: 78
End: 2023-12-09

## 2023-12-10 RX ORDER — ATORVASTATIN CALCIUM 10 MG/1
10 TABLET, FILM COATED ORAL DAILY
Qty: 90 TABLET | Refills: 1 | Status: SHIPPED | OUTPATIENT
Start: 2023-12-10

## 2023-12-14 RX ORDER — GLUCOSAM/CHON-MSM1/C/MANG/BOSW 500-416.6
TABLET ORAL
Qty: 300 EACH | Refills: 3 | Status: SHIPPED | OUTPATIENT
Start: 2023-12-14

## 2023-12-14 NOTE — TELEPHONE ENCOUNTER
Requested Prescriptions     Pending Prescriptions Disp Refills    [facility] [Pharmacy Med Name: Vivienne Winter 33] 300 each 3     Sig: TEST BLOOD SUGAR ON AVERAGE 3 TIMES DAILY.         Last Visit  3/29/23  Last Refill

## 2023-12-27 RX ORDER — CLONIDINE HYDROCHLORIDE 0.2 MG/1
0.2 TABLET ORAL 2 TIMES DAILY
Qty: 60 TABLET | Refills: 0 | Status: SHIPPED | OUTPATIENT
Start: 2023-12-27

## 2024-01-02 RX ORDER — CALCIUM CITRATE/VITAMIN D3 200MG-6.25
TABLET ORAL
Qty: 300 STRIP | Refills: 3 | Status: SHIPPED | OUTPATIENT
Start: 2024-01-02

## 2024-01-25 RX ORDER — GLIMEPIRIDE 1 MG/1
TABLET ORAL
Qty: 45 TABLET | Refills: 3 | OUTPATIENT
Start: 2024-01-25

## 2024-01-26 RX ORDER — CLONIDINE HYDROCHLORIDE 0.2 MG/1
0.2 TABLET ORAL 2 TIMES DAILY
Qty: 60 TABLET | Refills: 0 | Status: SHIPPED | OUTPATIENT
Start: 2024-01-26

## 2024-02-05 NOTE — TELEPHONE ENCOUNTER
PCP: Loly Brenner DO    Last Visit 3/29/2023   Future Appointments   Date Time Provider Department Center   3/5/2024 11:10 AM Gracia Mustafa MD RDE [facility] BS AMB       Requested Prescriptions     Pending Prescriptions Disp Refills    amLODIPine (NORVASC) 10 MG tablet 30 tablet      Sig: Take 1 tablet by mouth daily         Other Comments: Last Refill 09/26/22

## 2024-02-06 RX ORDER — AMLODIPINE BESYLATE 10 MG/1
10 TABLET ORAL DAILY
Qty: 90 TABLET | Refills: 0 | OUTPATIENT
Start: 2024-02-06

## 2024-02-06 RX ORDER — AMLODIPINE BESYLATE 10 MG/1
10 TABLET ORAL DAILY
Qty: 90 TABLET | Refills: 0 | Status: SHIPPED | OUTPATIENT
Start: 2024-02-06

## 2024-02-22 RX ORDER — CLONIDINE HYDROCHLORIDE 0.2 MG/1
0.2 TABLET ORAL 2 TIMES DAILY
Qty: 60 TABLET | Refills: 0 | Status: SHIPPED | OUTPATIENT
Start: 2024-02-22 | End: 2024-03-23

## 2024-02-22 RX ORDER — FUROSEMIDE 40 MG/1
40 TABLET ORAL DAILY
Qty: 30 TABLET | Refills: 0 | Status: SHIPPED | OUTPATIENT
Start: 2024-02-22

## 2024-02-22 NOTE — TELEPHONE ENCOUNTER
PCP: Loly Brenner DO    Last Visit 3/29/2023   Future Appointments   Date Time Provider Department Center   3/5/2024 11:10 AM Gracia Mustafa MD RDE [facility] BS AMB   3/25/2024 11:00 AM Loly Brenner DO [facility]       Requested Prescriptions     Pending Prescriptions Disp Refills    furosemide (LASIX) 40 MG tablet 30 tablet 0     Sig: Take 1 tablet by mouth daily Follow up for refills         Other Comments: Last Refill   10/23/23

## 2024-03-03 RX ORDER — METHIMAZOLE 5 MG/1
2.5 TABLET ORAL DAILY
Qty: 15 TABLET | Refills: 0 | Status: SHIPPED | OUTPATIENT
Start: 2024-03-03

## 2024-03-05 ENCOUNTER — OFFICE VISIT (OUTPATIENT)
Age: 79
End: 2024-03-05
Payer: COMMERCIAL

## 2024-03-05 VITALS
DIASTOLIC BLOOD PRESSURE: 60 MMHG | HEART RATE: 58 BPM | SYSTOLIC BLOOD PRESSURE: 126 MMHG | OXYGEN SATURATION: 100 % | BODY MASS INDEX: 19.27 KG/M2 | RESPIRATION RATE: 16 BRPM | HEIGHT: 65 IN

## 2024-03-05 DIAGNOSIS — E11.21 TYPE 2 DIABETES MELLITUS WITH DIABETIC NEPHROPATHY, WITHOUT LONG-TERM CURRENT USE OF INSULIN (HCC): ICD-10-CM

## 2024-03-05 DIAGNOSIS — E05.00 THYROTOXICOSIS WITH DIFFUSE GOITER WITHOUT THYROTOXIC CRISIS OR STORM: Primary | ICD-10-CM

## 2024-03-05 PROCEDURE — 3074F SYST BP LT 130 MM HG: CPT | Performed by: INTERNAL MEDICINE

## 2024-03-05 PROCEDURE — 3078F DIAST BP <80 MM HG: CPT | Performed by: INTERNAL MEDICINE

## 2024-03-05 PROCEDURE — 1124F ACP DISCUSS-NO DSCNMKR DOCD: CPT | Performed by: INTERNAL MEDICINE

## 2024-03-05 PROCEDURE — 99214 OFFICE O/P EST MOD 30 MIN: CPT | Performed by: INTERNAL MEDICINE

## 2024-03-05 NOTE — PROGRESS NOTES
Chief Complaint   Patient presents with    Thyroid Problem    Medication Refill        History of Present Illness: Jessica Smart is a 78 y.o. female with a past medical history significant for type 2 diabetes and peripheral  vascular disease seen in referral from Tiffanie Zapien MD for discussion related to Graves' disease.      Initial visit:   Jessica reports that she has been eating \" constantly\" but despite this she has been losing weight.  Started taking methimazole 10 mg once daily in April.  Reports that both insulin and sulfonylurea  therapy were recently discontinued due to morning hypoglycemia.  Endorses severe diarrhea that wakes her up from sleep.  Denies tremor currently.  There is no known family history of thyroid dysfunction.  Her son lives nearby and helps her out.  Does  endorse dry eyes.      10/05/2022:Dropped to 5mg daily MMI in June- was instructed to have labs done 2 weeks later, never done. Tiffanie Zapien MD moved, Yuly Brenner DO is new PCP- seeing her in December. Sam Geiger MD. BG was 70 this AM- nothing >100. Continues to lose weight. Tiffanie Zapien MD ordered a hemoccoult that was never done. Has received IV iron with nephrology.      11/09/2022: BG 88 this AM, 67 yesterday AM, 77, 74, 88, 82, 100, 82, 88, 74, 77, 88. MD Juanjo- said didn't need iron. Appetite is down at lunch. Continues to have diarrhea. Trying to eat snack between  10/10:30, again 2:30-3, 7:30-8. Protein shakes, jello, wheat thins and cheese.     03/05/2024: Eating well, weight has increased from last visit- BG never <70 with full tablet of glimepiride. Daughter in Law is cooking meals for her, sleeping well from 6PM- midnight.        Current Outpatient Medications:     CALCIUM PO, Take by mouth, Disp: , Rfl:     methIMAzole (TAPAZOLE) 5 MG tablet, Take 0.5 tablets by mouth daily, Disp: 15 tablet, Rfl: 0    cloNIDine (CATAPRES) 0.2 MG tablet, Take 1 tablet by mouth 2 times daily APPOINTMENT

## 2024-03-06 ENCOUNTER — PATIENT MESSAGE (OUTPATIENT)
Dept: PRIMARY CARE CLINIC | Facility: CLINIC | Age: 79
End: 2024-03-06

## 2024-03-06 NOTE — TELEPHONE ENCOUNTER
PCP: Loly Brenner DO    Last Visit 3/29/2023   Future Appointments   Date Time Provider Department Center   3/25/2024 11:00 AM Loly Brenner DO [facility] BS AMB   9/5/2024 11:10 AM Gracia Mustafa MD RDE [facility] BS AMB       Requested Prescriptions     Pending Prescriptions Disp Refills    glimepiride (AMARYL) 1 MG tablet 30 tablet 0     Sig: Take 1 tablet by mouth every morning (before breakfast)         Other Comments: Last Refill

## 2024-03-07 RX ORDER — GLIMEPIRIDE 1 MG/1
1 TABLET ORAL
Qty: 90 TABLET | Refills: 1 | Status: SHIPPED | OUTPATIENT
Start: 2024-03-07 | End: 2024-03-10 | Stop reason: SDUPTHER

## 2024-03-10 RX ORDER — GLIMEPIRIDE 1 MG/1
1 TABLET ORAL
Qty: 90 TABLET | Refills: 0 | Status: SHIPPED | OUTPATIENT
Start: 2024-03-10

## 2024-03-25 ENCOUNTER — OFFICE VISIT (OUTPATIENT)
Dept: PRIMARY CARE CLINIC | Facility: CLINIC | Age: 79
End: 2024-03-25
Payer: MEDICARE

## 2024-03-25 VITALS
OXYGEN SATURATION: 99 % | DIASTOLIC BLOOD PRESSURE: 56 MMHG | HEIGHT: 65 IN | HEART RATE: 62 BPM | TEMPERATURE: 98.4 F | RESPIRATION RATE: 12 BRPM | BODY MASS INDEX: 19.03 KG/M2 | SYSTOLIC BLOOD PRESSURE: 124 MMHG | WEIGHT: 114.2 LBS

## 2024-03-25 DIAGNOSIS — Z00.00 MEDICARE ANNUAL WELLNESS VISIT, SUBSEQUENT: ICD-10-CM

## 2024-03-25 DIAGNOSIS — I10 ESSENTIAL HYPERTENSION: ICD-10-CM

## 2024-03-25 DIAGNOSIS — N18.4 CHRONIC KIDNEY DISEASE, STAGE 4 (SEVERE) (HCC): ICD-10-CM

## 2024-03-25 DIAGNOSIS — E11.51 TYPE 2 DIABETES MELLITUS WITH DIABETIC PERIPHERAL ANGIOPATHY WITHOUT GANGRENE, WITHOUT LONG-TERM CURRENT USE OF INSULIN (HCC): Primary | ICD-10-CM

## 2024-03-25 DIAGNOSIS — Z71.89 ADVANCED CARE PLANNING/COUNSELING DISCUSSION: ICD-10-CM

## 2024-03-25 LAB
ALBUMIN SERPL-MCNC: 2.8 G/DL (ref 3.5–5)
ALBUMIN/GLOB SERPL: 0.8 (ref 1.1–2.2)
ALP SERPL-CCNC: 78 U/L (ref 45–117)
ALT SERPL-CCNC: 14 U/L (ref 12–78)
AST SERPL-CCNC: 12 U/L (ref 15–37)
BILIRUB DIRECT SERPL-MCNC: 0.2 MG/DL (ref 0–0.2)
BILIRUB SERPL-MCNC: 0.5 MG/DL (ref 0.2–1)
GLOBULIN SER CALC-MCNC: 3.6 G/DL (ref 2–4)
PROT SERPL-MCNC: 6.4 G/DL (ref 6.4–8.2)
TSH SERPL DL<=0.05 MIU/L-ACNC: 2.07 UIU/ML (ref 0.36–3.74)
WBC # BLD AUTO: 10.5 K/UL (ref 3.6–11)

## 2024-03-25 PROCEDURE — G8484 FLU IMMUNIZE NO ADMIN: HCPCS | Performed by: FAMILY MEDICINE

## 2024-03-25 PROCEDURE — 3074F SYST BP LT 130 MM HG: CPT | Performed by: FAMILY MEDICINE

## 2024-03-25 PROCEDURE — 3078F DIAST BP <80 MM HG: CPT | Performed by: FAMILY MEDICINE

## 2024-03-25 PROCEDURE — G0439 PPPS, SUBSEQ VISIT: HCPCS | Performed by: FAMILY MEDICINE

## 2024-03-25 PROCEDURE — 1124F ACP DISCUSS-NO DSCNMKR DOCD: CPT | Performed by: FAMILY MEDICINE

## 2024-03-25 ASSESSMENT — PATIENT HEALTH QUESTIONNAIRE - PHQ9
SUM OF ALL RESPONSES TO PHQ QUESTIONS 1-9: 0
SUM OF ALL RESPONSES TO PHQ QUESTIONS 1-9: 0
1. LITTLE INTEREST OR PLEASURE IN DOING THINGS: NOT AT ALL
SUM OF ALL RESPONSES TO PHQ9 QUESTIONS 1 & 2: 0
SUM OF ALL RESPONSES TO PHQ QUESTIONS 1-9: 0
SUM OF ALL RESPONSES TO PHQ QUESTIONS 1-9: 0
2. FEELING DOWN, DEPRESSED OR HOPELESS: NOT AT ALL

## 2024-03-25 ASSESSMENT — LIFESTYLE VARIABLES
HOW OFTEN DO YOU HAVE A DRINK CONTAINING ALCOHOL: NEVER
HOW MANY STANDARD DRINKS CONTAINING ALCOHOL DO YOU HAVE ON A TYPICAL DAY: PATIENT DOES NOT DRINK

## 2024-03-25 NOTE — PATIENT INSTRUCTIONS
may include:    Chest pain or pressure, or a strange feeling in the chest.     Sweating.     Shortness of breath.     Pain, pressure, or a strange feeling in the back, neck, jaw, or upper belly or in one or both shoulders or arms.     Lightheadedness or sudden weakness.     A fast or irregular heartbeat.   After you call 911, the  may tell you to chew 1 adult-strength or 2 to 4 low-dose aspirin. Wait for an ambulance. Do not try to drive yourself.  Watch closely for changes in your health, and be sure to contact your doctor if you have any problems.  Where can you learn more?  Go to https://www.Present.net/patientEd and enter F075 to learn more about \"A Healthy Heart: Care Instructions.\"  Current as of: June 24, 2023               Content Version: 14.0  © 7337-5785 [employer].   Care instructions adapted under license by [employer]. If you have questions about a medical condition or this instruction, always ask your healthcare professional. [employer] disclaims any warranty or liability for your use of this information.      Personalized Preventive Plan for Jessica Smart - 3/25/2024  Medicare offers a range of preventive health benefits. Some of the tests and screenings are paid in full while other may be subject to a deductible, co-insurance, and/or copay.    Some of these benefits include a comprehensive review of your medical history including lifestyle, illnesses that may run in your family, and various assessments and screenings as appropriate.    After reviewing your medical record and screening and assessments performed today your provider may have ordered immunizations, labs, imaging, and/or referrals for you.  A list of these orders (if applicable) as well as your Preventive Care list are included within your After Visit Summary for your review.    Other Preventive Recommendations:    A preventive eye exam performed by an eye specialist is recommended every 1-2

## 2024-03-25 NOTE — PROGRESS NOTES
BP (!) 124/56 (Site: Left Upper Arm, Position: Sitting, Cuff Size: Small Adult)   Pulse 62   Temp 98.4 °F (36.9 °C) (Oral)   Resp 12   Ht 1.651 m (5' 5\")   Wt 51.8 kg (114 lb 3.2 oz)   SpO2 99%   BMI 19.00 kg/m²      \"Have you been to the ER, urgent care clinic since your last visit?  Hospitalized since your last visit?\"    NO    “Have you seen or consulted any other health care providers outside of [facility] since your last visit?”    NO    Patient has Kidney appt 5/24 with Dr. Geiger, Endocrinologist  Patient has appt with Dr. Mustafa (thyroid) on 9/15  Dr. Carbajal, Cardiologist    Needs refills with furosemide, clonidine, glimipiride    Patient also says she has concern about controlling her bowels, sometimes she doesn't know that she has gone to the bathroom on herself.          Click Here for Release of Records Request   
Loly Brenner DO   TRUE METRIX BLOOD GLUCOSE TEST strip TEST BLOOD SUGAR ON AVERAGE 3 TIMES DAILY. Yes Loly Brenner DO   TRUEplus Lancets 33G MISC TEST BLOOD SUGAR ON AVERAGE 3 TIMES DAILY. Yes Loly Brenner DO   atorvastatin (LIPITOR) 10 MG tablet Take 1 tablet by mouth daily Yes Loly Brenner DO   apixaban (ELIQUIS) 2.5 MG TABS tablet Take by mouth 2 times daily Yes Automatic Reconciliation, Ar   aspirin (RHEA LOW DOSE) 81 MG EC tablet TAKE 1 TABLET BY MOUTH DAILY Yes Automatic Reconciliation, Ar   carvedilol (COREG) 25 MG tablet Take by mouth 2 times daily Yes Automatic Reconciliation, Ar   Cholecalciferol 50 MCG (2000 UT) CAPS TAKE 1 CAPSULE EVERY DAY Yes Automatic Reconciliation, Ar   losartan (COZAAR) 25 MG tablet TAKE 1 TABLET BY MOUTH EVERY DAY Yes Automatic Reconciliation, Ar   sodium bicarbonate 650 MG tablet Take by mouth 2 times daily Yes Automatic Reconciliation, Ar   cloNIDine (CATAPRES) 0.2 MG tablet Take 1 tablet by mouth 2 times daily APPOINTMENT NEEDED FOR FURTHER REFILLS  Loly Brenner DO       CareTeam (Including outside providers/suppliers regularly involved in providing care):   Patient Care Team:  Loly Brenner DO as PCP - General  Loly Brenner DO as PCP - Empaneled Provider  Sid Hong MD as Physician  Gracia Mustafa MD as Physician     Reviewed and updated this visit:  Tobacco  Allergies  Meds  Med Hx  Surg Hx  Soc Hx  Fam Hx

## 2024-03-26 ENCOUNTER — CLINICAL DOCUMENTATION (OUTPATIENT)
Dept: SPIRITUAL SERVICES | Age: 79
End: 2024-03-26

## 2024-03-26 LAB — TSI ACT/NOR SER: 2.07 IU/L (ref 0–0.55)

## 2024-03-26 NOTE — PROGRESS NOTES
Advance Care Planning   Ambulatory ACP Specialist Patient Outreach    Date:  3/26/2024    ACP Specialist:  Demetra Daley    Outreach call to patient in follow-up to ACP Specialist referral from:Loly Brenner DO    [x] PCP  [] Provider   [] Ambulatory Care Management [] Other     For:                  [x] Advance Directive Assistance              [] Complete Portable DNR order              [] Complete POST/POLST/MOST              [] Code Status Discussion             [] Discuss Goals of Care             [] Early ACP Decision-Making              [] Other (Specify)    Date Referral Received: 3/25/24    Next Step:   [x] ACP scheduled conversation  [] Outreach again in one week               [x] Email / Mail ACP Info Sheets  [x] Email / Mail Advance Directive   [] Closing referral.  Routing closure to referring provider/staff and to ACP Specialist .    [] Closure letter mailed to patient with invitation to contact ACP Specialist if / when ready.   [] Other (Specify here):         [x] At this time, Healthcare Decision Maker Is:  Advance Care Planning   Healthcare Decision Maker:    Primary Decision Maker: Santiago Curtis - Child - [phone]    Primary Decision Maker: Charlie Salmon - Child - [phone]          [] Primary agent named in scanned advance directive.    [x] Legal Next of Kin.     [] Unable to determine legal decision maker at this time.       Outreaches:         [x] 1st -  Date:  3/26/24               Intervention:  [x] Spoke with Patient   [] Left Voice mail [] Email / Mail    [] MyChart  [] Other (Specify) :     Outcomes:  Spoke with both patient and son Santiago Curtis on mobile phone number which is also listed as the home number scheduling a telephone conversation with ACP Specialist Cristiano Santana on Thursday, 4/11/24 at 9:30 p.m.              [] 2nd -  Date:                 Intervention:  [] Spoke with Patient  [] Left Voice mail [] Email / Mail    [] MyChart  [] Other (Specify) :

## 2024-03-27 DIAGNOSIS — E05.00 THYROTOXICOSIS WITH DIFFUSE GOITER WITHOUT THYROTOXIC CRISIS OR STORM: Primary | ICD-10-CM

## 2024-03-27 LAB
T3 SERPL-MCNC: 79 NG/DL (ref 71–180)
T4 FREE SERPL-MCNC: 1.19 NG/DL (ref 0.82–1.77)

## 2024-03-27 RX ORDER — METHIMAZOLE 5 MG/1
2.5 TABLET ORAL DAILY
Qty: 45 TABLET | Refills: 2 | Status: SHIPPED | OUTPATIENT
Start: 2024-03-27

## 2024-03-28 NOTE — TELEPHONE ENCOUNTER
PCP: Loly Brenner DO    Last Visit 3/25/2024   Future Appointments   Date Time Provider Department Center   9/5/2024 11:10 AM Gracia Mustafa MD RDE [facility] BS AMB       Requested Prescriptions     Pending Prescriptions Disp Refills    furosemide (LASIX) 40 MG tablet [Pharmacy Med Name: FUROSEMIDE 40MG TABLETS] 30 tablet 0     Sig: TAKE 1 TABLET BY MOUTH DAILY. FOLLOW UP FOR REFILLS         Other Comments: Last Refill   02/22/24

## 2024-03-31 RX ORDER — FUROSEMIDE 40 MG/1
40 TABLET ORAL DAILY
Qty: 90 TABLET | Refills: 1 | Status: SHIPPED | OUTPATIENT
Start: 2024-03-31

## 2024-04-02 RX ORDER — FUROSEMIDE 40 MG/1
40 TABLET ORAL DAILY
Qty: 90 TABLET | Refills: 1 | OUTPATIENT
Start: 2024-04-02

## 2024-04-04 RX ORDER — CLONIDINE HYDROCHLORIDE 0.2 MG/1
0.2 TABLET ORAL 2 TIMES DAILY
Qty: 180 TABLET | Refills: 0 | Status: SHIPPED | OUTPATIENT
Start: 2024-04-04 | End: 2024-07-03

## 2024-04-11 ENCOUNTER — CLINICAL DOCUMENTATION (OUTPATIENT)
Dept: CASE MANAGEMENT | Age: 79
End: 2024-04-11

## 2024-04-11 NOTE — ACP (ADVANCE CARE PLANNING)
Advance Care Planning   Ambulatory ACP Specialist Patient Outreach    Date:  4/11/2024    ACP Specialist:  Catherine Santana LCSW    Outreach call to patient in follow-up to ACP Specialist referral from:Loly Brenner DO    [x] PCP  [] Provider   [] Ambulatory Care Management [] Other     For:                  [x] Advance Directive Assistance              [] Complete Portable DNR order              [] Complete POST/POLST/MOST              [] Code Status Discussion             [] Discuss Goals of Care             [] Early ACP Decision-Making              [] Other (Specify)     Date Referral Received: 3/25/24    Next Step:   [x] ACP scheduled conversation  [] Outreach again in one week               [] Email / Mail ACP Info Sheets  [] Email / Mail Advance Directive   [] Closing referral.  Routing closure to referring provider/staff and to ACP Specialist .    [] Closure letter mailed to patient with invitation to contact ACP Specialist if / when ready.   [] Other (Specify here):         [x] At this time, Healthcare Decision Maker Is:        [] Primary agent named in scanned advance directive.    [x] Legal Next of Kin-   Primary Decision Maker: Santiago Curtis - Child - [phone]    Primary Decision Maker: Charlie Salmon - Child - [phone]    [] Unable to determine legal decision maker at this time.         Outreaches:           [x]  Additional Outreach -  Date:  4/11/2024   (Specify Dates & special circumstances): Emailed messaged from patient's son, Santiago Curtis    Outcomes: ACP Specialist received an email message from patient's son, Santiago Curtis, requesting to cancel ACP appointment scheduled for today and would like to reschedule ACP appointment for Friday, April 19, 2024 at 9:30am.      Thank you for this referral.    Catherine Santana LCSW, LISW-CP  Advance Care   [facility]  [phone]

## 2024-04-19 ENCOUNTER — CLINICAL DOCUMENTATION (OUTPATIENT)
Dept: CASE MANAGEMENT | Age: 79
End: 2024-04-19

## 2024-04-26 ENCOUNTER — CLINICAL DOCUMENTATION (OUTPATIENT)
Dept: CASE MANAGEMENT | Age: 79
End: 2024-04-26

## 2024-05-01 RX ORDER — AMLODIPINE BESYLATE 10 MG/1
10 TABLET ORAL DAILY
Qty: 90 TABLET | Refills: 1 | Status: SHIPPED | OUTPATIENT
Start: 2024-05-01

## 2024-05-21 RX ORDER — CARVEDILOL 25 MG/1
TABLET ORAL 2 TIMES DAILY
Qty: 60 TABLET | Status: CANCELLED | OUTPATIENT
Start: 2024-05-21

## 2024-05-21 NOTE — TELEPHONE ENCOUNTER
PCP: Loly Brenner DO    Last Visit 3/25/2024   Future Appointments   Date Time Provider Department Center   9/5/2024 11:10 AM Gracia Mustafa MD RDE [facility] BS AMB       Requested Prescriptions     Pending Prescriptions Disp Refills    carvedilol (COREG) 25 MG tablet 60 tablet      Sig: Take by mouth 2 times daily         Other Comments: Last Refill 06/26/2020

## 2024-05-22 RX ORDER — CARVEDILOL 25 MG/1
25 TABLET ORAL 2 TIMES DAILY
Qty: 180 TABLET | Refills: 0 | Status: SHIPPED | OUTPATIENT
Start: 2024-05-22

## 2024-05-24 RX ORDER — METHIMAZOLE 5 MG/1
2.5 TABLET ORAL DAILY
Qty: 45 TABLET | Refills: 2 | Status: SHIPPED | OUTPATIENT
Start: 2024-05-24

## 2024-05-24 RX ORDER — CARVEDILOL 25 MG/1
25 TABLET ORAL 2 TIMES DAILY
Qty: 180 TABLET | Refills: 0 | OUTPATIENT
Start: 2024-05-24

## 2024-05-31 RX ORDER — ATORVASTATIN CALCIUM 10 MG/1
10 TABLET, FILM COATED ORAL DAILY
Qty: 90 TABLET | Refills: 1 | OUTPATIENT
Start: 2024-05-31

## 2024-05-31 NOTE — TELEPHONE ENCOUNTER
PCP: Loly Brenner DO    Last Visit 3/25/2024   Future Appointments   Date Time Provider Department Center   9/5/2024 11:10 AM Gracia Mustafa MD RDE [facility] BS AMB       Requested Prescriptions     Pending Prescriptions Disp Refills    atorvastatin (LIPITOR) 10 MG tablet 90 tablet 1     Sig: Take 1 tablet by mouth daily         Other Comments: Last Refill   12/10/23

## 2024-06-06 RX ORDER — ATORVASTATIN CALCIUM 10 MG/1
10 TABLET, FILM COATED ORAL DAILY
Qty: 90 TABLET | Refills: 1 | OUTPATIENT
Start: 2024-06-06

## 2024-06-07 RX ORDER — ATORVASTATIN CALCIUM 10 MG/1
10 TABLET, FILM COATED ORAL DAILY
Qty: 90 TABLET | Refills: 1 | OUTPATIENT
Start: 2024-06-07

## 2024-06-07 NOTE — TELEPHONE ENCOUNTER
PCP: Loly Brenner DO    Last Visit 3/25/2024   Future Appointments   Date Time Provider Department Center   9/5/2024 11:10 AM Gracia Mustafa MD RDE [facility] BS AMB       Requested Prescriptions     Pending Prescriptions Disp Refills    atorvastatin (LIPITOR) 10 MG tablet 90 tablet 1     Sig: Take 1 tablet by mouth daily         Other Comments: Last Refill   12/10/23

## 2024-06-21 NOTE — TELEPHONE ENCOUNTER
PCP: Loly Brenner DO    Last Visit 3/25/2024   Future Appointments   Date Time Provider Department Center   9/5/2024 11:10 AM Gracia Mustafa MD RDE [facility] BS AMB       Requested Prescriptions     Pending Prescriptions Disp Refills    atorvastatin (LIPITOR) 10 MG tablet 90 tablet 1     Sig: Take 1 tablet by mouth daily         Other Comments: Last Refill 12/10/2023

## 2024-06-23 RX ORDER — ATORVASTATIN CALCIUM 10 MG/1
10 TABLET, FILM COATED ORAL DAILY
Qty: 90 TABLET | Refills: 1 | Status: SHIPPED | OUTPATIENT
Start: 2024-06-23

## 2024-07-07 RX ORDER — FUROSEMIDE 40 MG/1
40 TABLET ORAL DAILY
Qty: 90 TABLET | Refills: 1 | Status: SHIPPED | OUTPATIENT
Start: 2024-07-07

## 2024-08-06 NOTE — TELEPHONE ENCOUNTER
PCP: Loly Brenner DO    Last Visit 3/25/2024   Future Appointments   Date Time Provider Department Center   9/5/2024 11:10 AM Gracia Mustafa MD RDE [facility] BS AMB       Requested Prescriptions     Pending Prescriptions Disp Refills    glimepiride (AMARYL) 1 MG tablet 90 tablet 0     Sig: Take 1 tablet by mouth every morning (before breakfast)         Other Comments: Last Refill   03/10/24

## 2024-08-07 NOTE — TELEPHONE ENCOUNTER
PCP: Loly Brenner DO    Last Visit 3/25/2024   Future Appointments   Date Time Provider Department Center   9/5/2024 11:10 AM Gracia Mustafa MD RDE [facility] BS AMB       Requested Prescriptions     Pending Prescriptions Disp Refills    cloNIDine (CATAPRES) 0.2 MG tablet 180 tablet 0     Sig: Take 1 tablet by mouth 2 times daily APPOINTMENT NEEDED FOR FURTHER REFILLS         Other Comments: Last Refill   04/04/24

## 2024-08-09 RX ORDER — GLIMEPIRIDE 1 MG/1
1 TABLET ORAL
Qty: 90 TABLET | Refills: 0 | Status: SHIPPED | OUTPATIENT
Start: 2024-08-09

## 2024-08-09 RX ORDER — CLONIDINE HYDROCHLORIDE 0.2 MG/1
0.2 TABLET ORAL 2 TIMES DAILY
Qty: 180 TABLET | Refills: 0 | OUTPATIENT
Start: 2024-08-09 | End: 2024-11-07

## 2024-08-23 RX ORDER — CARVEDILOL 25 MG/1
25 TABLET ORAL 2 TIMES DAILY
Qty: 180 TABLET | Refills: 1 | Status: SHIPPED | OUTPATIENT
Start: 2024-08-23

## 2024-08-23 NOTE — TELEPHONE ENCOUNTER
PCP: Loly Brenner DO    Last Visit 3/25/2024   Future Appointments   Date Time Provider Department Center   9/5/2024 11:10 AM Gracia Mustafa MD RDE [facility] BS AMB       Requested Prescriptions     Pending Prescriptions Disp Refills    carvedilol (COREG) 25 MG tablet 180 tablet 0     Sig: Take 1 tablet by mouth 2 times daily         Other Comments: Last Refill   05/22/24

## 2024-08-26 RX ORDER — METHIMAZOLE 5 MG/1
2.5 TABLET ORAL DAILY
Qty: 45 TABLET | Refills: 2 | Status: SHIPPED | OUTPATIENT
Start: 2024-08-26

## 2024-10-01 RX ORDER — ATORVASTATIN CALCIUM 10 MG/1
10 TABLET, FILM COATED ORAL DAILY
Qty: 90 TABLET | Refills: 1 | Status: SHIPPED | OUTPATIENT
Start: 2024-10-01

## 2024-10-01 NOTE — TELEPHONE ENCOUNTER
PCP: Loly Brenner DO    Last Visit 3/25/2024   Future Appointments   Date Time Provider Department Center   11/20/2024  9:20 AM Hallie Cedeno PA-C [facility]   1/23/2025 11:10 AM Gracia Mustafa MD [facility] BS [facility]       Requested Prescriptions     Pending Prescriptions Disp Refills    atorvastatin (LIPITOR) 10 MG tablet [Pharmacy Med Name: ATORVASTATIN 10MG TABLETS] 90 tablet 1     Sig: TAKE 1 TABLET BY MOUTH DAILY         Other Comments: Last Refill   06/23/24

## 2024-10-18 ENCOUNTER — TELEPHONE (OUTPATIENT)
Dept: PRIMARY CARE CLINIC | Facility: CLINIC | Age: 79
End: 2024-10-18

## 2024-10-18 NOTE — TELEPHONE ENCOUNTER
Spoke with a service rep at [facility] health insurance, asked service rep at fax over a chronic condition form.

## 2024-10-22 NOTE — TELEPHONE ENCOUNTER
PCP: Loly Brenner DO    Last Visit 3/25/2024   Future Appointments   Date Time Provider Department Center   11/20/2024  9:20 AM Hallie Cedeno PA-C [facility]   1/23/2025 11:10 AM Gracia Mustafa MD E [facility] BS [facility]       Requested Prescriptions     Pending Prescriptions Disp Refills    amLODIPine (NORVASC) 10 MG tablet [Pharmacy Med Name: AMLODIPINE BESYLATE 10MG TABLETS] 90 tablet 1     Sig: TAKE 1 TABLET BY MOUTH DAILY         Other Comments: Last Refill   05/01/24

## 2024-10-23 RX ORDER — AMLODIPINE BESYLATE 10 MG/1
10 TABLET ORAL DAILY
Qty: 90 TABLET | Refills: 0 | Status: SHIPPED | OUTPATIENT
Start: 2024-10-23

## 2024-10-30 RX ORDER — CLONIDINE HYDROCHLORIDE 0.2 MG/1
0.2 TABLET ORAL 2 TIMES DAILY
Qty: 180 TABLET | Refills: 0 | Status: SHIPPED | OUTPATIENT
Start: 2024-10-30 | End: 2025-01-28

## 2024-10-30 NOTE — TELEPHONE ENCOUNTER
PCP: Loly Brenner DO    Last Visit 3/25/2024   Future Appointments   Date Time Provider Department Center   11/20/2024  9:20 AM Hallie Cedeno PA-C [facility]   1/23/2025 11:10 AM Gracia Mustafa MD E [facility] BS [facility]       Requested Prescriptions     Pending Prescriptions Disp Refills    cloNIDine (CATAPRES) 0.2 MG tablet 180 tablet 0     Sig: Take 1 tablet by mouth 2 times daily APPOINTMENT NEEDED FOR FURTHER REFILLS         Other Comments: Last Refill   04/04/24

## 2024-11-19 SDOH — HEALTH STABILITY: PHYSICAL HEALTH
ON AVERAGE, HOW MANY DAYS PER WEEK DO YOU ENGAGE IN MODERATE TO STRENUOUS EXERCISE (LIKE A BRISK WALK)?: PATIENT DECLINED

## 2024-11-19 NOTE — PROGRESS NOTES
[facility]   [address], [address]  [place] [address]  P: [phone]  F: [phone]    SUBJECTIVE     HPI:     Jessica Smart is a 79 y.o. female who is seen in the clinic for   Chief Complaint   Patient presents with    New Patient     Wants something to help with Hemorrhoids.    Annual Exam        The patient presents to the office today to follow-up on chronic conditions. She is accompanied by her son, Santiago.    Type 2 diabetes mellitus with diabetic peripheral angiopathy without gangrene, without long-term current use of insulin  Blood sugar has been running in the 70s, yesterday 165. No low blood sugar readings. Compliant on medications and followed by Dr. Mustafa.    Hypertension associated with type 2 diabetes mellitus   Not checking BP at home. Compliant on amlodipine 01 mg, carvedilol 25 mg BID, clonidine 0.2 mg BID, and furosemide 40 mg. Denies lightheadedness and dizziness.    Graves disease  Managed by Dr. Mustafa. Compliant on methimazole.    Chronic kidney disease, stage 4 (severe)   Followed by Dr. Geiger.    Anemia of chronic renal failure, stage 4 (severe)   Followed by Dr. Geiger.    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    Hemorrhoids  Denies straining. Has tried preparation H suppositories, but she has not been sure how to use it. She does have a small amount of blood when wiping.     Health screenings:   Eye exam Overdue    Dental exam Overdue    DEXA scan Done 8/7/2017    COVID vaccine Done 10/28/2022    Tdap Overdue    Pneumonia vaccine  Done PCV-13 7/25/2017, PPSV23 7/26/2018, PCV-13 7/11/2020  Shingles vaccine  Never done    Influenza   given today    RSV  given today    Patient Active Problem List   Diagnosis    Diabetic polyneuropathy associated with type 2 diabetes mellitus (HCC)    Controlled type 2 diabetes mellitus with circulatory disorder (HCC)    Essential hypertension    History of stroke with current residual effects    Peripheral

## 2024-11-20 ENCOUNTER — OFFICE VISIT (OUTPATIENT)
Dept: PRIMARY CARE CLINIC | Facility: CLINIC | Age: 79
End: 2024-11-20

## 2024-11-20 VITALS
BODY MASS INDEX: 19 KG/M2 | HEIGHT: 65 IN | HEART RATE: 74 BPM | OXYGEN SATURATION: 97 % | RESPIRATION RATE: 15 BRPM | TEMPERATURE: 97.3 F | SYSTOLIC BLOOD PRESSURE: 139 MMHG | DIASTOLIC BLOOD PRESSURE: 69 MMHG

## 2024-11-20 DIAGNOSIS — Z89.612 ACQUIRED ABSENCE OF LEFT LEG ABOVE KNEE (HCC): ICD-10-CM

## 2024-11-20 DIAGNOSIS — E05.00 GRAVES DISEASE: ICD-10-CM

## 2024-11-20 DIAGNOSIS — Z23 ENCOUNTER FOR IMMUNIZATION: ICD-10-CM

## 2024-11-20 DIAGNOSIS — K64.9 ACUTE HEMORRHOID: ICD-10-CM

## 2024-11-20 DIAGNOSIS — E11.59 HYPERTENSION ASSOCIATED WITH TYPE 2 DIABETES MELLITUS (HCC): ICD-10-CM

## 2024-11-20 DIAGNOSIS — Z99.3 WHEELCHAIR DEPENDENT: ICD-10-CM

## 2024-11-20 DIAGNOSIS — Z78.0 POSTMENOPAUSAL: ICD-10-CM

## 2024-11-20 DIAGNOSIS — N18.4 CHRONIC KIDNEY DISEASE, STAGE 4 (SEVERE) (HCC): ICD-10-CM

## 2024-11-20 DIAGNOSIS — N18.4 ANEMIA OF CHRONIC RENAL FAILURE, STAGE 4 (SEVERE) (HCC): ICD-10-CM

## 2024-11-20 DIAGNOSIS — D63.1 ANEMIA OF CHRONIC RENAL FAILURE, STAGE 4 (SEVERE) (HCC): ICD-10-CM

## 2024-11-20 DIAGNOSIS — I15.2 HYPERTENSION ASSOCIATED WITH TYPE 2 DIABETES MELLITUS (HCC): ICD-10-CM

## 2024-11-20 DIAGNOSIS — E11.51 TYPE 2 DIABETES MELLITUS WITH DIABETIC PERIPHERAL ANGIOPATHY WITHOUT GANGRENE, WITHOUT LONG-TERM CURRENT USE OF INSULIN (HCC): Primary | ICD-10-CM

## 2024-11-20 RX ORDER — HYDROCORTISONE ACETATE 25 MG/1
25 SUPPOSITORY RECTAL 2 TIMES DAILY PRN
Qty: 60 SUPPOSITORY | Refills: 0 | Status: SHIPPED | OUTPATIENT
Start: 2024-11-20

## 2024-11-20 SDOH — ECONOMIC STABILITY: FOOD INSECURITY: WITHIN THE PAST 12 MONTHS, THE FOOD YOU BOUGHT JUST DIDN'T LAST AND YOU DIDN'T HAVE MONEY TO GET MORE.: NEVER TRUE

## 2024-11-20 SDOH — ECONOMIC STABILITY: FOOD INSECURITY: WITHIN THE PAST 12 MONTHS, YOU WORRIED THAT YOUR FOOD WOULD RUN OUT BEFORE YOU GOT MONEY TO BUY MORE.: NEVER TRUE

## 2024-11-20 SDOH — ECONOMIC STABILITY: INCOME INSECURITY: HOW HARD IS IT FOR YOU TO PAY FOR THE VERY BASICS LIKE FOOD, HOUSING, MEDICAL CARE, AND HEATING?: NOT HARD AT ALL

## 2024-11-20 ASSESSMENT — PATIENT HEALTH QUESTIONNAIRE - PHQ9
SUM OF ALL RESPONSES TO PHQ9 QUESTIONS 1 & 2: 0
SUM OF ALL RESPONSES TO PHQ QUESTIONS 1-9: 0
2. FEELING DOWN, DEPRESSED OR HOPELESS: NOT AT ALL
SUM OF ALL RESPONSES TO PHQ QUESTIONS 1-9: 0
1. LITTLE INTEREST OR PLEASURE IN DOING THINGS: NOT AT ALL

## 2024-11-20 ASSESSMENT — ENCOUNTER SYMPTOMS
BLOOD IN STOOL: 1
CONSTIPATION: 1
WHEEZING: 0
COUGH: 0
NAUSEA: 0
DIARRHEA: 0
ABDOMINAL PAIN: 0
VOMITING: 0
SHORTNESS OF BREATH: 0

## 2024-11-20 NOTE — PROGRESS NOTES
Health Decision Maker has been checked with the patient   Primary Decision Maker: Santiago Curtis - Child - [phone]    Primary Decision Maker: Charlie Salmon - Child - [phone]         Chief Complaint   Patient presents with    New Patient    Annual Exam       \"Have you been to the ER, urgent care clinic since your last visit?  Hospitalized since your last visit?\"    NO    “Have you seen or consulted any other health care providers outside of [facility] since your last visit?”    NO                   Click Here for Release of Records Request

## 2024-11-21 ENCOUNTER — TELEPHONE (OUTPATIENT)
Dept: PRIMARY CARE CLINIC | Facility: CLINIC | Age: 79
End: 2024-11-21

## 2024-11-21 DIAGNOSIS — K64.9 ACUTE HEMORRHOID: Primary | ICD-10-CM

## 2024-11-21 RX ORDER — METHIMAZOLE 5 MG/1
2.5 TABLET ORAL DAILY
Qty: 45 TABLET | Refills: 2 | Status: SHIPPED | OUTPATIENT
Start: 2024-11-21

## 2024-11-21 RX ORDER — BENZOCAINE/MENTHOL 6 MG-10 MG
LOZENGE MUCOUS MEMBRANE
Qty: 30 G | Refills: 1 | Status: SHIPPED | OUTPATIENT
Start: 2024-11-21 | End: 2024-11-28

## 2024-12-17 DIAGNOSIS — E78.5 HYPERLIPIDEMIA ASSOCIATED WITH TYPE 2 DIABETES MELLITUS (HCC): Primary | ICD-10-CM

## 2024-12-17 DIAGNOSIS — E11.69 HYPERLIPIDEMIA ASSOCIATED WITH TYPE 2 DIABETES MELLITUS (HCC): Primary | ICD-10-CM

## 2024-12-17 RX ORDER — ATORVASTATIN CALCIUM 10 MG/1
10 TABLET, FILM COATED ORAL DAILY
Qty: 90 TABLET | Refills: 1 | Status: SHIPPED | OUTPATIENT
Start: 2024-12-17

## 2025-01-01 ENCOUNTER — OFFICE VISIT (OUTPATIENT)
Dept: PRIMARY CARE CLINIC | Facility: CLINIC | Age: 80
End: 2025-01-01
Payer: MEDICARE

## 2025-01-01 ENCOUNTER — APPOINTMENT (OUTPATIENT)
Facility: HOSPITAL | Age: 80
DRG: 871 | End: 2025-01-01
Payer: MEDICARE

## 2025-01-01 ENCOUNTER — APPOINTMENT (OUTPATIENT)
Facility: HOSPITAL | Age: 80
DRG: 871 | End: 2025-01-01
Attending: HOSPITALIST
Payer: MEDICARE

## 2025-01-01 ENCOUNTER — HOSPITAL ENCOUNTER (INPATIENT)
Facility: HOSPITAL | Age: 80
LOS: 3 days | DRG: 871 | End: 2025-03-20
Attending: EMERGENCY MEDICINE | Admitting: HOSPITALIST
Payer: MEDICARE

## 2025-01-01 VITALS
OXYGEN SATURATION: 91 % | WEIGHT: 137.9 LBS | HEART RATE: 73 BPM | SYSTOLIC BLOOD PRESSURE: 132 MMHG | TEMPERATURE: 97.2 F | DIASTOLIC BLOOD PRESSURE: 54 MMHG | RESPIRATION RATE: 19 BRPM | BODY MASS INDEX: 22.95 KG/M2

## 2025-01-01 VITALS
HEIGHT: 65 IN | SYSTOLIC BLOOD PRESSURE: 100 MMHG | TEMPERATURE: 95.5 F | HEART RATE: 51 BPM | OXYGEN SATURATION: 99 % | RESPIRATION RATE: 20 BRPM | BODY MASS INDEX: 19 KG/M2 | DIASTOLIC BLOOD PRESSURE: 46 MMHG

## 2025-01-01 DIAGNOSIS — R68.89 LOW BODY TEMPERATURE: ICD-10-CM

## 2025-01-01 DIAGNOSIS — R29.6 FALLS: ICD-10-CM

## 2025-01-01 DIAGNOSIS — R07.9 CHEST PAIN, UNSPECIFIED TYPE: ICD-10-CM

## 2025-01-01 DIAGNOSIS — R79.89 ELEVATED LACTIC ACID LEVEL: ICD-10-CM

## 2025-01-01 DIAGNOSIS — I95.9 HYPOTENSION, UNSPECIFIED HYPOTENSION TYPE: ICD-10-CM

## 2025-01-01 DIAGNOSIS — R15.9 INCONTINENCE OF FECES, UNSPECIFIED FECAL INCONTINENCE TYPE: ICD-10-CM

## 2025-01-01 DIAGNOSIS — E83.51 HYPOCALCEMIA: ICD-10-CM

## 2025-01-01 DIAGNOSIS — R44.3 HALLUCINATIONS: Primary | ICD-10-CM

## 2025-01-01 DIAGNOSIS — T68.XXXA HYPOTHERMIA, INITIAL ENCOUNTER: Primary | ICD-10-CM

## 2025-01-01 DIAGNOSIS — R32 URINARY INCONTINENCE, UNSPECIFIED TYPE: ICD-10-CM

## 2025-01-01 DIAGNOSIS — N30.00 ACUTE CYSTITIS WITHOUT HEMATURIA: ICD-10-CM

## 2025-01-01 LAB
ALBUMIN SERPL-MCNC: 1.4 G/DL (ref 3.5–5)
ALBUMIN SERPL-MCNC: 2.9 G/DL (ref 3.5–5)
ALBUMIN/GLOB SERPL: 0.5 (ref 1.1–2.2)
ALBUMIN/GLOB SERPL: 0.9 (ref 1.1–2.2)
ALP SERPL-CCNC: 37 U/L (ref 45–117)
ALP SERPL-CCNC: 55 U/L (ref 45–117)
ALT SERPL-CCNC: 14 U/L (ref 12–78)
ALT SERPL-CCNC: 14 U/L (ref 12–78)
ANION GAP SERPL CALC-SCNC: 13 MMOL/L (ref 2–12)
ANION GAP SERPL CALC-SCNC: 13 MMOL/L (ref 2–12)
ANION GAP SERPL CALC-SCNC: 14 MMOL/L (ref 2–12)
ANION GAP SERPL CALC-SCNC: 16 MMOL/L (ref 2–12)
APPEARANCE UR: ABNORMAL
APTT PPP: 29.4 SEC (ref 22.1–31)
ARTERIAL PATENCY WRIST A: ABNORMAL
ARTERIAL PATENCY WRIST A: POSITIVE
AST SERPL-CCNC: 12 U/L (ref 15–37)
AST SERPL-CCNC: ABNORMAL U/L (ref 15–37)
BACTERIA SPEC CULT: ABNORMAL
BACTERIA SPEC CULT: NORMAL
BACTERIA SPEC CULT: NORMAL
BACTERIA URNS QL MICRO: ABNORMAL /HPF
BASE DEFICIT BLD-SCNC: 4.2 MMOL/L
BASE DEFICIT BLD-SCNC: 7.7 MMOL/L
BASE DEFICIT BLDV-SCNC: 13.8 MMOL/L
BASOPHILS # BLD: 0.01 K/UL (ref 0–0.1)
BASOPHILS # BLD: 0.01 K/UL (ref 0–0.1)
BASOPHILS # BLD: 0.04 K/UL (ref 0–0.1)
BASOPHILS NFR BLD: 0.1 % (ref 0–1)
BASOPHILS NFR BLD: 0.1 % (ref 0–1)
BASOPHILS NFR BLD: 0.2 % (ref 0–1)
BDY SITE: ABNORMAL
BILIRUB SERPL-MCNC: 0.3 MG/DL (ref 0.2–1)
BILIRUB SERPL-MCNC: 1.2 MG/DL (ref 0.2–1)
BILIRUB UR QL: NEGATIVE
BUN SERPL-MCNC: 72 MG/DL (ref 6–20)
BUN SERPL-MCNC: 73 MG/DL (ref 6–20)
BUN SERPL-MCNC: 75 MG/DL (ref 6–20)
BUN SERPL-MCNC: 78 MG/DL (ref 6–20)
BUN/CREAT SERPL: 26 (ref 12–20)
BUN/CREAT SERPL: 27 (ref 12–20)
BUN/CREAT SERPL: 28 (ref 12–20)
BUN/CREAT SERPL: 28 (ref 12–20)
CALCIUM SERPL-MCNC: 6.3 MG/DL (ref 8.5–10.1)
CALCIUM SERPL-MCNC: 6.4 MG/DL (ref 8.5–10.1)
CALCIUM SERPL-MCNC: 7 MG/DL (ref 8.5–10.1)
CALCIUM SERPL-MCNC: 7.2 MG/DL (ref 8.5–10.1)
CC UR VC: ABNORMAL
CHLORIDE SERPL-SCNC: 112 MMOL/L (ref 97–108)
CHLORIDE SERPL-SCNC: 116 MMOL/L (ref 97–108)
CHLORIDE SERPL-SCNC: 117 MMOL/L (ref 97–108)
CHLORIDE SERPL-SCNC: 118 MMOL/L (ref 97–108)
CO2 SERPL-SCNC: 14 MMOL/L (ref 21–32)
CO2 SERPL-SCNC: 15 MMOL/L (ref 21–32)
CO2 SERPL-SCNC: 20 MMOL/L (ref 21–32)
CO2 SERPL-SCNC: 9 MMOL/L (ref 21–32)
COLOR UR: ABNORMAL
COMMENT:: NORMAL
CREAT SERPL-MCNC: 2.56 MG/DL (ref 0.55–1.02)
CREAT SERPL-MCNC: 2.66 MG/DL (ref 0.55–1.02)
CREAT SERPL-MCNC: 2.78 MG/DL (ref 0.55–1.02)
CREAT SERPL-MCNC: 2.93 MG/DL (ref 0.55–1.02)
CREAT UR-MCNC: 34.6 MG/DL
DIFFERENTIAL METHOD BLD: ABNORMAL
ECHO AO ROOT DIAM: 2.5 CM
ECHO AO ROOT INDEX: 1.48 CM/M2
ECHO AV AREA PEAK VELOCITY: 1.5 CM2
ECHO AV AREA VTI: 1.5 CM2
ECHO AV AREA/BSA PEAK VELOCITY: 0.9 CM2/M2
ECHO AV AREA/BSA VTI: 0.9 CM2/M2
ECHO AV MEAN GRADIENT: 5 MMHG
ECHO AV MEAN VELOCITY: 1 M/S
ECHO AV PEAK GRADIENT: 8 MMHG
ECHO AV PEAK VELOCITY: 1.5 M/S
ECHO AV VELOCITY RATIO: 0.6
ECHO AV VTI: 34.3 CM
ECHO LA DIAMETER INDEX: 2.43 CM/M2
ECHO LA DIAMETER: 4.1 CM
ECHO LA TO AORTIC ROOT RATIO: 1.64
ECHO LA VOL A-L A2C: 66 ML (ref 22–52)
ECHO LA VOL A-L A4C: 83 ML (ref 22–52)
ECHO LA VOL MOD A2C: 62 ML (ref 22–52)
ECHO LA VOL MOD A4C: 80 ML (ref 22–52)
ECHO LA VOLUME AREA LENGTH: 82 ML
ECHO LA VOLUME INDEX A-L A2C: 39 ML/M2 (ref 16–34)
ECHO LA VOLUME INDEX A-L A4C: 49 ML/M2 (ref 16–34)
ECHO LA VOLUME INDEX AREA LENGTH: 49 ML/M2 (ref 16–34)
ECHO LA VOLUME INDEX MOD A2C: 37 ML/M2 (ref 16–34)
ECHO LA VOLUME INDEX MOD A4C: 47 ML/M2 (ref 16–34)
ECHO LV E' LATERAL VELOCITY: 4.79 CM/S
ECHO LV EDV A2C: 85 ML
ECHO LV EDV A4C: 75 ML
ECHO LV EDV BP: 81 ML (ref 56–104)
ECHO LV EDV INDEX A4C: 44 ML/M2
ECHO LV EDV INDEX BP: 48 ML/M2
ECHO LV EDV NDEX A2C: 50 ML/M2
ECHO LV EF PHYSICIAN: 55 %
ECHO LV EJECTION FRACTION A2C: 38 %
ECHO LV EJECTION FRACTION A4C: 39 %
ECHO LV ESV A2C: 52 ML
ECHO LV ESV A4C: 46 ML
ECHO LV ESV BP: 49 ML (ref 19–49)
ECHO LV ESV INDEX A2C: 31 ML/M2
ECHO LV ESV INDEX A4C: 27 ML/M2
ECHO LV ESV INDEX BP: 29 ML/M2
ECHO LV FRACTIONAL SHORTENING: 15 % (ref 28–44)
ECHO LV INTERNAL DIMENSION DIASTOLE INDEX: 2.43 CM/M2
ECHO LV INTERNAL DIMENSION DIASTOLIC: 4.1 CM (ref 3.9–5.3)
ECHO LV INTERNAL DIMENSION SYSTOLIC INDEX: 2.07 CM/M2
ECHO LV INTERNAL DIMENSION SYSTOLIC: 3.5 CM
ECHO LV IVSD: 1.6 CM (ref 0.6–0.9)
ECHO LV MASS 2D: 228.6 G (ref 67–162)
ECHO LV MASS INDEX 2D: 135.3 G/M2 (ref 43–95)
ECHO LV POSTERIOR WALL DIASTOLIC: 1.3 CM (ref 0.6–0.9)
ECHO LV RELATIVE WALL THICKNESS RATIO: 0.63
ECHO LVOT AREA: 2.5 CM2
ECHO LVOT AV VTI INDEX: 0.6
ECHO LVOT DIAM: 1.8 CM
ECHO LVOT MEAN GRADIENT: 2 MMHG
ECHO LVOT PEAK GRADIENT: 3 MMHG
ECHO LVOT PEAK VELOCITY: 0.9 M/S
ECHO LVOT STROKE VOLUME INDEX: 30.9 ML/M2
ECHO LVOT SV: 52.1 ML
ECHO LVOT VTI: 20.5 CM
ECHO MV A VELOCITY: 1.01 M/S
ECHO MV AREA PHT: 4.6 CM2
ECHO MV AREA VTI: 1.4 CM2
ECHO MV E DECELERATION TIME (DT): 165.4 MS
ECHO MV E VELOCITY: 1.1 M/S
ECHO MV E/A RATIO: 1.09
ECHO MV E/E' LATERAL: 22.96
ECHO MV EROA PISA: 0 CM2
ECHO MV LVOT VTI INDEX: 1.83
ECHO MV MAX VELOCITY: 1.3 M/S
ECHO MV MEAN GRADIENT: 4 MMHG
ECHO MV MEAN VELOCITY: 0.9 M/S
ECHO MV PEAK GRADIENT: 7 MMHG
ECHO MV PRESSURE HALF TIME (PHT): 48 MS
ECHO MV REGURGITANT ALIASING (NYQUIST) VELOCITY: 38 CM/S
ECHO MV REGURGITANT RADIUS PISA: 0.34 CM
ECHO MV REGURGITANT VELOCITY PISA: 5.7 M/S
ECHO MV REGURGITANT VOLUME PISA: 10.21 ML
ECHO MV REGURGITANT VTIA: 211 CM
ECHO MV VTI: 37.6 CM
ECHO RV INTERNAL DIMENSION: 4.1 CM
ECHO RV TAPSE: 1.7 CM (ref 1.7–?)
ECHO TV REGURGITANT MAX VELOCITY: 3.56 M/S
ECHO TV REGURGITANT PEAK GRADIENT: 51 MMHG
EKG ATRIAL RATE: 55 BPM
EKG ATRIAL RATE: 68 BPM
EKG DIAGNOSIS: NORMAL
EKG DIAGNOSIS: NORMAL
EKG P AXIS: 49 DEGREES
EKG P AXIS: 69 DEGREES
EKG P-R INTERVAL: 264 MS
EKG P-R INTERVAL: 298 MS
EKG Q-T INTERVAL: 480 MS
EKG Q-T INTERVAL: 524 MS
EKG QRS DURATION: 88 MS
EKG QRS DURATION: 92 MS
EKG QTC CALCULATION (BAZETT): 501 MS
EKG QTC CALCULATION (BAZETT): 510 MS
EKG R AXIS: -3 DEGREES
EKG R AXIS: 11 DEGREES
EKG T AXIS: 164 DEGREES
EKG T AXIS: 200 DEGREES
EKG VENTRICULAR RATE: 55 BPM
EKG VENTRICULAR RATE: 68 BPM
EOSINOPHIL # BLD: 0 K/UL (ref 0–0.4)
EOSINOPHIL # BLD: 0.02 K/UL (ref 0–0.4)
EOSINOPHIL # BLD: 0.05 K/UL (ref 0–0.4)
EOSINOPHIL NFR BLD: 0 % (ref 0–7)
EOSINOPHIL NFR BLD: 0.1 % (ref 0–7)
EOSINOPHIL NFR BLD: 0.5 % (ref 0–7)
EPITH CASTS URNS QL MICRO: ABNORMAL /LPF
ERYTHROCYTE [DISTWIDTH] IN BLOOD BY AUTOMATED COUNT: 16.4 % (ref 11.5–14.5)
ERYTHROCYTE [DISTWIDTH] IN BLOOD BY AUTOMATED COUNT: 16.6 % (ref 11.5–14.5)
ERYTHROCYTE [DISTWIDTH] IN BLOOD BY AUTOMATED COUNT: 16.7 % (ref 11.5–14.5)
EST. AVERAGE GLUCOSE BLD GHB EST-MCNC: 108 MG/DL
FERRITIN SERPL-MCNC: 88 NG/ML (ref 26–388)
FIO2 ON VENT: 100 %
FOLATE SERPL-MCNC: 18.2 NG/ML (ref 5–21)
GAS FLOW.O2 O2 DELIVERY SYS: ABNORMAL
GAS FLOW.O2 O2 DELIVERY SYS: ABNORMAL
GAS FLOW.O2 SETTING OXYMISER: 8
GLOBULIN SER CALC-MCNC: 3 G/DL (ref 2–4)
GLOBULIN SER CALC-MCNC: 3.2 G/DL (ref 2–4)
GLUCOSE BLD STRIP.AUTO-MCNC: 104 MG/DL (ref 65–117)
GLUCOSE BLD STRIP.AUTO-MCNC: 109 MG/DL (ref 65–117)
GLUCOSE BLD STRIP.AUTO-MCNC: 116 MG/DL (ref 65–117)
GLUCOSE BLD STRIP.AUTO-MCNC: 128 MG/DL (ref 65–117)
GLUCOSE BLD STRIP.AUTO-MCNC: 130 MG/DL (ref 65–117)
GLUCOSE BLD STRIP.AUTO-MCNC: 75 MG/DL (ref 65–117)
GLUCOSE BLD STRIP.AUTO-MCNC: 75 MG/DL (ref 65–117)
GLUCOSE BLD STRIP.AUTO-MCNC: 81 MG/DL (ref 65–117)
GLUCOSE BLD STRIP.AUTO-MCNC: 94 MG/DL (ref 65–117)
GLUCOSE BLD STRIP.AUTO-MCNC: 94 MG/DL (ref 65–117)
GLUCOSE BLD STRIP.AUTO-MCNC: 95 MG/DL (ref 65–117)
GLUCOSE SERPL-MCNC: 108 MG/DL (ref 65–100)
GLUCOSE SERPL-MCNC: 84 MG/DL (ref 65–100)
GLUCOSE SERPL-MCNC: 92 MG/DL (ref 65–100)
GLUCOSE SERPL-MCNC: 94 MG/DL (ref 65–100)
GLUCOSE UR STRIP.AUTO-MCNC: NEGATIVE MG/DL
HBA1C MFR BLD: 5.4 % (ref 4–5.6)
HCO3 BLD-SCNC: 18.8 MMOL/L (ref 21–28)
HCO3 BLD-SCNC: 20.9 MMOL/L (ref 21–28)
HCO3 BLDV-SCNC: 14 MMOL/L (ref 23–28)
HCT VFR BLD AUTO: 18.9 % (ref 35–47)
HCT VFR BLD AUTO: 21.1 % (ref 35–47)
HCT VFR BLD AUTO: 24.2 % (ref 35–47)
HCT VFR BLD AUTO: 26.1 % (ref 35–47)
HEMOCCULT STL QL: POSITIVE
HGB BLD-MCNC: 6 G/DL (ref 11.5–16)
HGB BLD-MCNC: 6.6 G/DL (ref 11.5–16)
HGB BLD-MCNC: 7.7 G/DL (ref 11.5–16)
HGB BLD-MCNC: 8.3 G/DL (ref 11.5–16)
HGB UR QL STRIP: ABNORMAL
HISTORY CHECK: NORMAL
HISTORY CHECK: NORMAL
IMM GRANULOCYTES # BLD AUTO: 0.07 K/UL (ref 0–0.04)
IMM GRANULOCYTES # BLD AUTO: 0.09 K/UL (ref 0–0.04)
IMM GRANULOCYTES # BLD AUTO: 0.12 K/UL (ref 0–0.04)
IMM GRANULOCYTES NFR BLD AUTO: 0.6 % (ref 0–0.5)
IMM GRANULOCYTES NFR BLD AUTO: 0.7 % (ref 0–0.5)
IMM GRANULOCYTES NFR BLD AUTO: 0.7 % (ref 0–0.5)
INR PPP: 1.2 (ref 0.9–1.1)
IPAP/PIP: 12
IRON SATN MFR SERPL: 34 % (ref 20–50)
IRON SERPL-MCNC: 49 UG/DL (ref 35–150)
KETONES UR QL STRIP.AUTO: NEGATIVE MG/DL
LACTATE SERPL-SCNC: 0.6 MMOL/L (ref 0.4–2)
LACTATE SERPL-SCNC: 0.8 MMOL/L (ref 0.4–2)
LACTATE SERPL-SCNC: 0.9 MMOL/L (ref 0.4–2)
LACTATE SERPL-SCNC: 0.9 MMOL/L (ref 0.4–2)
LACTATE SERPL-SCNC: 1 MMOL/L (ref 0.4–2)
LACTATE SERPL-SCNC: 1 MMOL/L (ref 0.4–2)
LACTATE SERPL-SCNC: 1.4 MMOL/L (ref 0.4–2)
LACTATE SERPL-SCNC: 3.5 MMOL/L (ref 0.4–2)
LEUKOCYTE ESTERASE UR QL STRIP.AUTO: ABNORMAL
LYMPHOCYTES # BLD: 1.01 K/UL (ref 0.8–3.5)
LYMPHOCYTES # BLD: 1.22 K/UL (ref 0.8–3.5)
LYMPHOCYTES # BLD: 2.34 K/UL (ref 0.8–3.5)
LYMPHOCYTES NFR BLD: 23.6 % (ref 12–49)
LYMPHOCYTES NFR BLD: 6.9 % (ref 12–49)
LYMPHOCYTES NFR BLD: 6.9 % (ref 12–49)
MAGNESIUM SERPL-MCNC: 0.8 MG/DL (ref 1.6–2.4)
MAGNESIUM SERPL-MCNC: 1.3 MG/DL (ref 1.6–2.4)
MAGNESIUM SERPL-MCNC: 1.3 MG/DL (ref 1.6–2.4)
MAGNESIUM SERPL-MCNC: NORMAL MG/DL (ref 1.6–2.4)
MCH RBC QN AUTO: 26.9 PG (ref 26–34)
MCH RBC QN AUTO: 27.5 PG (ref 26–34)
MCH RBC QN AUTO: 28 PG (ref 26–34)
MCHC RBC AUTO-ENTMCNC: 31.7 G/DL (ref 30–36.5)
MCHC RBC AUTO-ENTMCNC: 31.8 G/DL (ref 30–36.5)
MCHC RBC AUTO-ENTMCNC: 31.8 G/DL (ref 30–36.5)
MCV RBC AUTO: 84.8 FL (ref 80–99)
MCV RBC AUTO: 86.4 FL (ref 80–99)
MCV RBC AUTO: 88 FL (ref 80–99)
MICROALBUMIN UR-MCNC: 8.68 MG/DL
MICROALBUMIN/CREAT UR-RTO: 251 MG/G (ref 0–30)
MONOCYTES # BLD: 0.26 K/UL (ref 0–1)
MONOCYTES # BLD: 0.28 K/UL (ref 0–1)
MONOCYTES # BLD: 0.28 K/UL (ref 0–1)
MONOCYTES NFR BLD: 1.6 % (ref 5–13)
MONOCYTES NFR BLD: 1.8 % (ref 5–13)
MONOCYTES NFR BLD: 2.8 % (ref 5–13)
NEUTS SEG # BLD: 13.23 K/UL (ref 1.8–8)
NEUTS SEG # BLD: 16.02 K/UL (ref 1.8–8)
NEUTS SEG # BLD: 7.15 K/UL (ref 1.8–8)
NEUTS SEG NFR BLD: 72.3 % (ref 32–75)
NEUTS SEG NFR BLD: 90.5 % (ref 32–75)
NEUTS SEG NFR BLD: 90.6 % (ref 32–75)
NITRITE UR QL STRIP.AUTO: NEGATIVE
NRBC # BLD: 0.02 K/UL (ref 0–0.01)
NRBC # BLD: 0.04 K/UL (ref 0–0.01)
NRBC # BLD: 0.04 K/UL (ref 0–0.01)
NRBC BLD-RTO: 0.2 PER 100 WBC
NRBC BLD-RTO: 0.2 PER 100 WBC
NRBC BLD-RTO: 0.3 PER 100 WBC
O2/TOTAL GAS SETTING VFR VENT: 100 %
O2/TOTAL GAS SETTING VFR VENT: 90 %
PCO2 BLD: 37 MMHG (ref 35–48)
PCO2 BLD: 42.2 MMHG (ref 35–48)
PCO2 BLDV: 40.3 MMHG (ref 41–51)
PEEP RESPIRATORY: 5 CMH2O
PEEP RESPIRATORY: 6
PEEP RESPIRATORY: 6 CMH2O
PH BLD: 7.26 (ref 7.35–7.45)
PH BLD: 7.36 (ref 7.35–7.45)
PH BLDV: 7.17 (ref 7.32–7.42)
PH UR STRIP: 5.5 (ref 5–8)
PHOSPHATE SERPL-MCNC: 5.3 MG/DL (ref 2.6–4.7)
PLATELET # BLD AUTO: 131 K/UL (ref 150–400)
PLATELET # BLD AUTO: 183 K/UL (ref 150–400)
PLATELET # BLD AUTO: 199 K/UL (ref 150–400)
PMV BLD AUTO: 10.9 FL (ref 8.9–12.9)
PMV BLD AUTO: 11.1 FL (ref 8.9–12.9)
PMV BLD AUTO: 11.1 FL (ref 8.9–12.9)
PO2 BLD: 237 MMHG (ref 83–108)
PO2 BLD: 46 MMHG (ref 83–108)
PO2 BLDV: 43 MMHG (ref 25–40)
POTASSIUM SERPL-SCNC: 3.5 MMOL/L (ref 3.5–5.1)
POTASSIUM SERPL-SCNC: 3.7 MMOL/L (ref 3.5–5.1)
POTASSIUM SERPL-SCNC: 3.9 MMOL/L (ref 3.5–5.1)
POTASSIUM SERPL-SCNC: ABNORMAL MMOL/L (ref 3.5–5.1)
PRESSURE SUPPORT SETTING VENT: 12 CMH2O
PRESSURE SUPPORT SETTING VENT: 5 CMH2O
PROCALCITONIN SERPL-MCNC: 0.89 NG/ML
PROT SERPL-MCNC: 4.4 G/DL (ref 6.4–8.2)
PROT SERPL-MCNC: 6.1 G/DL (ref 6.4–8.2)
PROT UR STRIP-MCNC: NEGATIVE MG/DL
PROTHROMBIN TIME: 12.1 SEC (ref 9.2–11.2)
RBC # BLD AUTO: 2.23 M/UL (ref 3.8–5.2)
RBC # BLD AUTO: 2.75 M/UL (ref 3.8–5.2)
RBC # BLD AUTO: 3.02 M/UL (ref 3.8–5.2)
RBC #/AREA URNS HPF: ABNORMAL /HPF (ref 0–5)
RBC MORPH BLD: ABNORMAL
SAO2 % BLD: 80.5 % (ref 92–97)
SAO2 % BLD: 99.7 % (ref 92–97)
SAO2 % BLDV: 67 % (ref 65–88)
SAO2% DEVICE SAO2% SENSOR NAME: ABNORMAL
SERVICE CMNT-IMP: ABNORMAL
SERVICE CMNT-IMP: NORMAL
SODIUM SERPL-SCNC: 141 MMOL/L (ref 136–145)
SODIUM SERPL-SCNC: 145 MMOL/L (ref 136–145)
SODIUM SERPL-SCNC: 145 MMOL/L (ref 136–145)
SODIUM SERPL-SCNC: 146 MMOL/L (ref 136–145)
SP GR UR REFRACTOMETRY: 1.01 (ref 1–1.03)
SPECIMEN HOLD: NORMAL
SPECIMEN SITE: ABNORMAL
SPECIMEN TYPE: ABNORMAL
SPECIMEN TYPE: ABNORMAL
T4 FREE SERPL-MCNC: 0.9 NG/DL (ref 0.8–1.5)
THERAPEUTIC RANGE: NORMAL SECS (ref 58–77)
TIBC SERPL-MCNC: 143 UG/DL (ref 250–450)
TROPONIN I SERPL HS-MCNC: 100 NG/L (ref 0–51)
TROPONIN I SERPL HS-MCNC: 82 NG/L (ref 0–51)
TSH SERPL DL<=0.05 MIU/L-ACNC: 10.6 UIU/ML (ref 0.36–3.74)
UROBILINOGEN UR QL STRIP.AUTO: 0.2 EU/DL (ref 0.2–1)
VANCOMYCIN SERPL-MCNC: 14.9 UG/ML
VANCOMYCIN SERPL-MCNC: 19.2 UG/ML
VENTILATION MODE VENT: ABNORMAL
VIT B12 SERPL-MCNC: 610 PG/ML (ref 193–986)
WBC # BLD AUTO: 14.6 K/UL (ref 3.6–11)
WBC # BLD AUTO: 17.7 K/UL (ref 3.6–11)
WBC # BLD AUTO: 9.9 K/UL (ref 3.6–11)
WBC MORPH BLD: ABNORMAL
WBC URNS QL MICRO: ABNORMAL /HPF (ref 0–4)

## 2025-01-01 PROCEDURE — 93005 ELECTROCARDIOGRAM TRACING: CPT | Performed by: EMERGENCY MEDICINE

## 2025-01-01 PROCEDURE — 6360000002 HC RX W HCPCS: Performed by: STUDENT IN AN ORGANIZED HEALTH CARE EDUCATION/TRAINING PROGRAM

## 2025-01-01 PROCEDURE — 85018 HEMOGLOBIN: CPT

## 2025-01-01 PROCEDURE — 2580000003 HC RX 258: Performed by: HOSPITALIST

## 2025-01-01 PROCEDURE — 2060000000 HC ICU INTERMEDIATE R&B

## 2025-01-01 PROCEDURE — 6360000002 HC RX W HCPCS: Performed by: NURSE PRACTITIONER

## 2025-01-01 PROCEDURE — 96361 HYDRATE IV INFUSION ADD-ON: CPT

## 2025-01-01 PROCEDURE — 82962 GLUCOSE BLOOD TEST: CPT

## 2025-01-01 PROCEDURE — 36430 TRANSFUSION BLD/BLD COMPNT: CPT

## 2025-01-01 PROCEDURE — 83521 IG LIGHT CHAINS FREE EACH: CPT

## 2025-01-01 PROCEDURE — 82607 VITAMIN B-12: CPT

## 2025-01-01 PROCEDURE — 96374 THER/PROPH/DIAG INJ IV PUSH: CPT

## 2025-01-01 PROCEDURE — 83605 ASSAY OF LACTIC ACID: CPT

## 2025-01-01 PROCEDURE — 83036 HEMOGLOBIN GLYCOSYLATED A1C: CPT

## 2025-01-01 PROCEDURE — 82784 ASSAY IGA/IGD/IGG/IGM EACH: CPT

## 2025-01-01 PROCEDURE — 51798 US URINE CAPACITY MEASURE: CPT

## 2025-01-01 PROCEDURE — 1090F PRES/ABSN URINE INCON ASSESS: CPT

## 2025-01-01 PROCEDURE — 6360000002 HC RX W HCPCS: Performed by: HOSPITALIST

## 2025-01-01 PROCEDURE — 94660 CPAP INITIATION&MGMT: CPT

## 2025-01-01 PROCEDURE — 81001 URINALYSIS AUTO W/SCOPE: CPT

## 2025-01-01 PROCEDURE — G8427 DOCREV CUR MEDS BY ELIG CLIN: HCPCS

## 2025-01-01 PROCEDURE — 85610 PROTHROMBIN TIME: CPT

## 2025-01-01 PROCEDURE — 80202 ASSAY OF VANCOMYCIN: CPT

## 2025-01-01 PROCEDURE — 85014 HEMATOCRIT: CPT

## 2025-01-01 PROCEDURE — 6370000000 HC RX 637 (ALT 250 FOR IP): Performed by: HOSPITALIST

## 2025-01-01 PROCEDURE — 1036F TOBACCO NON-USER: CPT

## 2025-01-01 PROCEDURE — 83735 ASSAY OF MAGNESIUM: CPT

## 2025-01-01 PROCEDURE — G8399 PT W/DXA RESULTS DOCUMENT: HCPCS

## 2025-01-01 PROCEDURE — 71045 X-RAY EXAM CHEST 1 VIEW: CPT

## 2025-01-01 PROCEDURE — 93306 TTE W/DOPPLER COMPLETE: CPT | Performed by: INTERNAL MEDICINE

## 2025-01-01 PROCEDURE — 82728 ASSAY OF FERRITIN: CPT

## 2025-01-01 PROCEDURE — 6360000002 HC RX W HCPCS: Performed by: INTERNAL MEDICINE

## 2025-01-01 PROCEDURE — 94640 AIRWAY INHALATION TREATMENT: CPT

## 2025-01-01 PROCEDURE — 93010 ELECTROCARDIOGRAM REPORT: CPT | Performed by: INTERNAL MEDICINE

## 2025-01-01 PROCEDURE — 84145 PROCALCITONIN (PCT): CPT

## 2025-01-01 PROCEDURE — 80053 COMPREHEN METABOLIC PANEL: CPT

## 2025-01-01 PROCEDURE — P9047 ALBUMIN (HUMAN), 25%, 50ML: HCPCS | Performed by: HOSPITALIST

## 2025-01-01 PROCEDURE — 84155 ASSAY OF PROTEIN SERUM: CPT

## 2025-01-01 PROCEDURE — 6370000000 HC RX 637 (ALT 250 FOR IP): Performed by: NURSE PRACTITIONER

## 2025-01-01 PROCEDURE — 99285 EMERGENCY DEPT VISIT HI MDM: CPT

## 2025-01-01 PROCEDURE — 6360000002 HC RX W HCPCS

## 2025-01-01 PROCEDURE — 1160F RVW MEDS BY RX/DR IN RCRD: CPT

## 2025-01-01 PROCEDURE — 86902 BLOOD TYPE ANTIGEN DONOR EA: CPT

## 2025-01-01 PROCEDURE — 99215 OFFICE O/P EST HI 40 MIN: CPT

## 2025-01-01 PROCEDURE — 85730 THROMBOPLASTIN TIME PARTIAL: CPT

## 2025-01-01 PROCEDURE — 2500000003 HC RX 250 WO HCPCS: Performed by: EMERGENCY MEDICINE

## 2025-01-01 PROCEDURE — 6360000002 HC RX W HCPCS: Performed by: EMERGENCY MEDICINE

## 2025-01-01 PROCEDURE — 83540 ASSAY OF IRON: CPT

## 2025-01-01 PROCEDURE — 36415 COLL VENOUS BLD VENIPUNCTURE: CPT

## 2025-01-01 PROCEDURE — 36600 WITHDRAWAL OF ARTERIAL BLOOD: CPT

## 2025-01-01 PROCEDURE — 86334 IMMUNOFIX E-PHORESIS SERUM: CPT

## 2025-01-01 PROCEDURE — 84165 PROTEIN E-PHORESIS SERUM: CPT

## 2025-01-01 PROCEDURE — 2500000003 HC RX 250 WO HCPCS: Performed by: INTERNAL MEDICINE

## 2025-01-01 PROCEDURE — 1126F AMNT PAIN NOTED NONE PRSNT: CPT

## 2025-01-01 PROCEDURE — 86901 BLOOD TYPING SEROLOGIC RH(D): CPT

## 2025-01-01 PROCEDURE — 86920 COMPATIBILITY TEST SPIN: CPT

## 2025-01-01 PROCEDURE — 86922 COMPATIBILITY TEST ANTIGLOB: CPT

## 2025-01-01 PROCEDURE — 86850 RBC ANTIBODY SCREEN: CPT

## 2025-01-01 PROCEDURE — 30233N1 TRANSFUSION OF NONAUTOLOGOUS RED BLOOD CELLS INTO PERIPHERAL VEIN, PERCUTANEOUS APPROACH: ICD-10-PCS | Performed by: EMERGENCY MEDICINE

## 2025-01-01 PROCEDURE — 86921 COMPATIBILITY TEST INCUBATE: CPT

## 2025-01-01 PROCEDURE — 0509F URINE INCON PLAN DOCD: CPT

## 2025-01-01 PROCEDURE — 84439 ASSAY OF FREE THYROXINE: CPT

## 2025-01-01 PROCEDURE — 2580000003 HC RX 258: Performed by: EMERGENCY MEDICINE

## 2025-01-01 PROCEDURE — 84100 ASSAY OF PHOSPHORUS: CPT

## 2025-01-01 PROCEDURE — 82746 ASSAY OF FOLIC ACID SERUM: CPT

## 2025-01-01 PROCEDURE — 82803 BLOOD GASES ANY COMBINATION: CPT

## 2025-01-01 PROCEDURE — 84484 ASSAY OF TROPONIN QUANT: CPT

## 2025-01-01 PROCEDURE — 87186 SC STD MICRODIL/AGAR DIL: CPT

## 2025-01-01 PROCEDURE — 84443 ASSAY THYROID STIM HORMONE: CPT

## 2025-01-01 PROCEDURE — 2580000003 HC RX 258

## 2025-01-01 PROCEDURE — 93306 TTE W/DOPPLER COMPLETE: CPT

## 2025-01-01 PROCEDURE — 86905 BLOOD TYPING RBC ANTIGENS: CPT

## 2025-01-01 PROCEDURE — P9047 ALBUMIN (HUMAN), 25%, 50ML: HCPCS | Performed by: INTERNAL MEDICINE

## 2025-01-01 PROCEDURE — 80048 BASIC METABOLIC PNL TOTAL CA: CPT

## 2025-01-01 PROCEDURE — P9016 RBC LEUKOCYTES REDUCED: HCPCS

## 2025-01-01 PROCEDURE — 87086 URINE CULTURE/COLONY COUNT: CPT

## 2025-01-01 PROCEDURE — 1159F MED LIST DOCD IN RCRD: CPT

## 2025-01-01 PROCEDURE — 93005 ELECTROCARDIOGRAM TRACING: CPT | Performed by: HOSPITALIST

## 2025-01-01 PROCEDURE — 82272 OCCULT BLD FECES 1-3 TESTS: CPT

## 2025-01-01 PROCEDURE — 83550 IRON BINDING TEST: CPT

## 2025-01-01 PROCEDURE — 87040 BLOOD CULTURE FOR BACTERIA: CPT

## 2025-01-01 PROCEDURE — 1124F ACP DISCUSS-NO DSCNMKR DOCD: CPT

## 2025-01-01 PROCEDURE — 5A09457 ASSISTANCE WITH RESPIRATORY VENTILATION, 24-96 CONSECUTIVE HOURS, CONTINUOUS POSITIVE AIRWAY PRESSURE: ICD-10-PCS | Performed by: STUDENT IN AN ORGANIZED HEALTH CARE EDUCATION/TRAINING PROGRAM

## 2025-01-01 PROCEDURE — 86900 BLOOD TYPING SEROLOGIC ABO: CPT

## 2025-01-01 PROCEDURE — 2500000003 HC RX 250 WO HCPCS: Performed by: HOSPITALIST

## 2025-01-01 PROCEDURE — 96360 HYDRATION IV INFUSION INIT: CPT

## 2025-01-01 PROCEDURE — G8420 CALC BMI NORM PARAMETERS: HCPCS

## 2025-01-01 PROCEDURE — 3078F DIAST BP <80 MM HG: CPT

## 2025-01-01 PROCEDURE — 87088 URINE BACTERIA CULTURE: CPT

## 2025-01-01 PROCEDURE — 85025 COMPLETE CBC W/AUTO DIFF WBC: CPT

## 2025-01-01 PROCEDURE — 74176 CT ABD & PELVIS W/O CONTRAST: CPT

## 2025-01-01 PROCEDURE — 82570 ASSAY OF URINE CREATININE: CPT

## 2025-01-01 PROCEDURE — 82043 UR ALBUMIN QUANTITATIVE: CPT

## 2025-01-01 PROCEDURE — 70450 CT HEAD/BRAIN W/O DYE: CPT

## 2025-01-01 PROCEDURE — 86870 RBC ANTIBODY IDENTIFICATION: CPT

## 2025-01-01 PROCEDURE — 3074F SYST BP LT 130 MM HG: CPT

## 2025-01-01 RX ORDER — DEXTROSE MONOHYDRATE 100 MG/ML
INJECTION, SOLUTION INTRAVENOUS CONTINUOUS PRN
Status: DISCONTINUED | OUTPATIENT
Start: 2025-01-01 | End: 2025-01-01 | Stop reason: HOSPADM

## 2025-01-01 RX ORDER — INDOMETHACIN 25 MG/1
100 CAPSULE ORAL ONCE
Status: COMPLETED | OUTPATIENT
Start: 2025-01-01 | End: 2025-01-01

## 2025-01-01 RX ORDER — CLONIDINE HYDROCHLORIDE 0.2 MG/1
0.2 TABLET ORAL 2 TIMES DAILY
Qty: 180 TABLET | Refills: 1 | Status: SHIPPED | OUTPATIENT
Start: 2025-01-01 | End: 2025-03-21

## 2025-01-01 RX ORDER — ONDANSETRON 2 MG/ML
4 INJECTION INTRAMUSCULAR; INTRAVENOUS EVERY 6 HOURS PRN
Status: DISCONTINUED | OUTPATIENT
Start: 2025-01-01 | End: 2025-01-01 | Stop reason: HOSPADM

## 2025-01-01 RX ORDER — HYDROMORPHONE HYDROCHLORIDE 1 MG/ML
0.5 INJECTION, SOLUTION INTRAMUSCULAR; INTRAVENOUS; SUBCUTANEOUS EVERY 10 MIN PRN
Status: DISCONTINUED | OUTPATIENT
Start: 2025-01-01 | End: 2025-01-01 | Stop reason: HOSPADM

## 2025-01-01 RX ORDER — SODIUM CHLORIDE 9 MG/ML
INJECTION, SOLUTION INTRAVENOUS CONTINUOUS
Status: DISCONTINUED | OUTPATIENT
Start: 2025-01-01 | End: 2025-01-01

## 2025-01-01 RX ORDER — SODIUM CHLORIDE 9 MG/ML
INJECTION, SOLUTION INTRAVENOUS PRN
Status: DISCONTINUED | OUTPATIENT
Start: 2025-01-01 | End: 2025-01-01

## 2025-01-01 RX ORDER — LORAZEPAM 2 MG/ML
2 INJECTION INTRAMUSCULAR EVERY 10 MIN PRN
Status: DISCONTINUED | OUTPATIENT
Start: 2025-01-01 | End: 2025-01-01 | Stop reason: HOSPADM

## 2025-01-01 RX ORDER — FUROSEMIDE 10 MG/ML
40 INJECTION INTRAMUSCULAR; INTRAVENOUS ONCE
Status: COMPLETED | OUTPATIENT
Start: 2025-01-01 | End: 2025-01-01

## 2025-01-01 RX ORDER — SODIUM CHLORIDE, SODIUM LACTATE, POTASSIUM CHLORIDE, AND CALCIUM CHLORIDE .6; .31; .03; .02 G/100ML; G/100ML; G/100ML; G/100ML
1000 INJECTION, SOLUTION INTRAVENOUS ONCE
Status: COMPLETED | OUTPATIENT
Start: 2025-01-01 | End: 2025-01-01

## 2025-01-01 RX ORDER — BUMETANIDE 0.25 MG/ML
2 INJECTION, SOLUTION INTRAMUSCULAR; INTRAVENOUS EVERY 8 HOURS
Status: DISCONTINUED | OUTPATIENT
Start: 2025-01-01 | End: 2025-01-01 | Stop reason: HOSPADM

## 2025-01-01 RX ORDER — ALBUMIN (HUMAN) 12.5 G/50ML
25 SOLUTION INTRAVENOUS EVERY 6 HOURS
Status: DISCONTINUED | OUTPATIENT
Start: 2025-01-01 | End: 2025-01-01

## 2025-01-01 RX ORDER — ALBUMIN (HUMAN) 12.5 G/50ML
25 SOLUTION INTRAVENOUS EVERY 6 HOURS
Status: COMPLETED | OUTPATIENT
Start: 2025-01-01 | End: 2025-01-01

## 2025-01-01 RX ORDER — IPRATROPIUM BROMIDE AND ALBUTEROL SULFATE 2.5; .5 MG/3ML; MG/3ML
1 SOLUTION RESPIRATORY (INHALATION) EVERY 4 HOURS PRN
Status: DISCONTINUED | OUTPATIENT
Start: 2025-01-01 | End: 2025-01-01 | Stop reason: HOSPADM

## 2025-01-01 RX ORDER — DEXTROSE MONOHYDRATE 100 MG/ML
INJECTION, SOLUTION INTRAVENOUS CONTINUOUS PRN
Status: DISCONTINUED | OUTPATIENT
Start: 2025-01-01 | End: 2025-01-01 | Stop reason: SDUPTHER

## 2025-01-01 RX ORDER — VANCOMYCIN 500 MG/100ML
500 INJECTION, SOLUTION INTRAVENOUS ONCE
Status: COMPLETED | OUTPATIENT
Start: 2025-01-01 | End: 2025-01-01

## 2025-01-01 RX ORDER — FUROSEMIDE 40 MG/1
40 TABLET ORAL DAILY
Qty: 90 TABLET | Refills: 1 | Status: CANCELLED | OUTPATIENT
Start: 2025-01-01

## 2025-01-01 RX ORDER — SODIUM BICARBONATE 650 MG/1
650 TABLET ORAL 3 TIMES DAILY
Status: DISCONTINUED | OUTPATIENT
Start: 2025-01-01 | End: 2025-01-01

## 2025-01-01 RX ORDER — CALCIUM GLUCONATE 94 MG/ML
1000 INJECTION, SOLUTION INTRAVENOUS ONCE
Status: DISCONTINUED | OUTPATIENT
Start: 2025-01-01 | End: 2025-01-01 | Stop reason: CLARIF

## 2025-01-01 RX ORDER — ATORVASTATIN CALCIUM 10 MG/1
10 TABLET, FILM COATED ORAL DAILY
Status: DISCONTINUED | OUTPATIENT
Start: 2025-01-01 | End: 2025-01-01

## 2025-01-01 RX ORDER — CARVEDILOL 25 MG/1
25 TABLET ORAL 2 TIMES DAILY
Qty: 180 TABLET | Refills: 1 | Status: CANCELLED | OUTPATIENT
Start: 2025-01-01

## 2025-01-01 RX ORDER — MORPHINE SULFATE 4 MG/ML
4 INJECTION, SOLUTION INTRAMUSCULAR; INTRAVENOUS
Refills: 0 | Status: DISCONTINUED | OUTPATIENT
Start: 2025-01-01 | End: 2025-01-01

## 2025-01-01 RX ORDER — MORPHINE SULFATE 2 MG/ML
2 INJECTION, SOLUTION INTRAMUSCULAR; INTRAVENOUS
Refills: 0 | Status: DISCONTINUED | OUTPATIENT
Start: 2025-01-01 | End: 2025-01-01

## 2025-01-01 RX ORDER — CALCIUM GLUCONATE 20 MG/ML
2000 INJECTION, SOLUTION INTRAVENOUS ONCE
Status: COMPLETED | OUTPATIENT
Start: 2025-01-01 | End: 2025-01-01

## 2025-01-01 RX ORDER — SODIUM BICARBONATE 650 MG/1
1300 TABLET ORAL 3 TIMES DAILY
Status: DISCONTINUED | OUTPATIENT
Start: 2025-01-01 | End: 2025-01-01 | Stop reason: HOSPADM

## 2025-01-01 RX ORDER — LORAZEPAM 2 MG/ML
1 INJECTION INTRAMUSCULAR EVERY 6 HOURS PRN
Status: DISCONTINUED | OUTPATIENT
Start: 2025-01-01 | End: 2025-01-01

## 2025-01-01 RX ORDER — INSULIN LISPRO 100 [IU]/ML
0-4 INJECTION, SOLUTION INTRAVENOUS; SUBCUTANEOUS
Status: DISCONTINUED | OUTPATIENT
Start: 2025-01-01 | End: 2025-01-01

## 2025-01-01 RX ORDER — MAGNESIUM SULFATE IN WATER 40 MG/ML
2000 INJECTION, SOLUTION INTRAVENOUS ONCE
Status: COMPLETED | OUTPATIENT
Start: 2025-01-01 | End: 2025-01-01

## 2025-01-01 RX ORDER — CALCIUM GLUCONATE 20 MG/ML
1000 INJECTION, SOLUTION INTRAVENOUS ONCE
Status: COMPLETED | OUTPATIENT
Start: 2025-01-01 | End: 2025-01-01

## 2025-01-01 RX ADMIN — IPRATROPIUM BROMIDE AND ALBUTEROL SULFATE 1 DOSE: .5; 3 SOLUTION RESPIRATORY (INHALATION) at 22:53

## 2025-01-01 RX ADMIN — SODIUM BICARBONATE 650 MG: 650 TABLET ORAL at 09:26

## 2025-01-01 RX ADMIN — SODIUM CHLORIDE 1500 MG: 0.9 INJECTION, SOLUTION INTRAVENOUS at 17:05

## 2025-01-01 RX ADMIN — ALBUMIN (HUMAN) 25 G: 0.25 INJECTION, SOLUTION INTRAVENOUS at 03:10

## 2025-01-01 RX ADMIN — SODIUM BICARBONATE 100 MEQ: 84 INJECTION, SOLUTION INTRAVENOUS at 11:23

## 2025-01-01 RX ADMIN — CEFEPIME 1000 MG: 1 INJECTION, POWDER, FOR SOLUTION INTRAMUSCULAR; INTRAVENOUS at 03:44

## 2025-01-01 RX ADMIN — SODIUM CHLORIDE, SODIUM LACTATE, POTASSIUM CHLORIDE, AND CALCIUM CHLORIDE 1000 ML: .6; .31; .03; .02 INJECTION, SOLUTION INTRAVENOUS at 10:59

## 2025-01-01 RX ADMIN — SODIUM CHLORIDE, SODIUM LACTATE, POTASSIUM CHLORIDE, AND CALCIUM CHLORIDE 1000 ML: .6; .31; .03; .02 INJECTION, SOLUTION INTRAVENOUS at 12:36

## 2025-01-01 RX ADMIN — WATER 2000 MG: 1 INJECTION INTRAMUSCULAR; INTRAVENOUS; SUBCUTANEOUS at 16:57

## 2025-01-01 RX ADMIN — SODIUM BICARBONATE 650 MG: 650 TABLET ORAL at 21:03

## 2025-01-01 RX ADMIN — CALCIUM GLUCONATE 2000 MG: 20 INJECTION, SOLUTION INTRAVENOUS at 16:23

## 2025-01-01 RX ADMIN — WATER 1000 MG: 1 INJECTION INTRAMUSCULAR; INTRAVENOUS; SUBCUTANEOUS at 12:28

## 2025-01-01 RX ADMIN — MORPHINE SULFATE 2 MG: 2 INJECTION, SOLUTION INTRAMUSCULAR; INTRAVENOUS at 12:14

## 2025-01-01 RX ADMIN — SODIUM BICARBONATE 650 MG: 650 TABLET ORAL at 17:37

## 2025-01-01 RX ADMIN — CEFEPIME 1000 MG: 1 INJECTION, POWDER, FOR SOLUTION INTRAMUSCULAR; INTRAVENOUS at 03:28

## 2025-01-01 RX ADMIN — ALBUMIN (HUMAN) 25 G: 0.25 INJECTION, SOLUTION INTRAVENOUS at 16:02

## 2025-01-01 RX ADMIN — BUMETANIDE 2 MG: 0.25 INJECTION INTRAMUSCULAR; INTRAVENOUS at 06:24

## 2025-01-01 RX ADMIN — CEFEPIME 1000 MG: 1 INJECTION, POWDER, FOR SOLUTION INTRAMUSCULAR; INTRAVENOUS at 16:47

## 2025-01-01 RX ADMIN — PANTOPRAZOLE SODIUM 40 MG: 40 INJECTION, POWDER, FOR SOLUTION INTRAVENOUS at 03:06

## 2025-01-01 RX ADMIN — FUROSEMIDE 40 MG: 10 INJECTION, SOLUTION INTRAMUSCULAR; INTRAVENOUS at 11:02

## 2025-01-01 RX ADMIN — SODIUM BICARBONATE 650 MG: 650 TABLET ORAL at 13:52

## 2025-01-01 RX ADMIN — ALBUMIN (HUMAN) 25 G: 0.25 INJECTION, SOLUTION INTRAVENOUS at 03:24

## 2025-01-01 RX ADMIN — PANTOPRAZOLE SODIUM 40 MG: 40 INJECTION, POWDER, FOR SOLUTION INTRAVENOUS at 15:52

## 2025-01-01 RX ADMIN — ALBUMIN (HUMAN) 25 G: 0.25 INJECTION, SOLUTION INTRAVENOUS at 21:42

## 2025-01-01 RX ADMIN — FUROSEMIDE 40 MG: 10 INJECTION, SOLUTION INTRAMUSCULAR; INTRAVENOUS at 22:34

## 2025-01-01 RX ADMIN — VANCOMYCIN 500 MG: 500 INJECTION, SOLUTION INTRAVENOUS at 23:24

## 2025-01-01 RX ADMIN — ALBUMIN (HUMAN) 25 G: 0.25 INJECTION, SOLUTION INTRAVENOUS at 22:43

## 2025-01-01 RX ADMIN — ATORVASTATIN CALCIUM 10 MG: 10 TABLET, FILM COATED ORAL at 09:26

## 2025-01-01 RX ADMIN — BUMETANIDE 2 MG: 0.25 INJECTION INTRAMUSCULAR; INTRAVENOUS at 06:52

## 2025-01-01 RX ADMIN — BUMETANIDE 2 MG: 0.25 INJECTION INTRAMUSCULAR; INTRAVENOUS at 13:42

## 2025-01-01 RX ADMIN — PANTOPRAZOLE SODIUM 40 MG: 40 INJECTION, POWDER, FOR SOLUTION INTRAVENOUS at 15:03

## 2025-01-01 RX ADMIN — MORPHINE SULFATE 4 MG: 4 INJECTION INTRAVENOUS at 13:44

## 2025-01-01 RX ADMIN — BUMETANIDE 2 MG: 0.25 INJECTION INTRAMUSCULAR; INTRAVENOUS at 15:03

## 2025-01-01 RX ADMIN — ALBUMIN (HUMAN) 25 G: 0.25 INJECTION, SOLUTION INTRAVENOUS at 02:47

## 2025-01-01 RX ADMIN — MAGNESIUM SULFATE HEPTAHYDRATE 2000 MG: 40 INJECTION, SOLUTION INTRAVENOUS at 18:50

## 2025-01-01 RX ADMIN — ATORVASTATIN CALCIUM 10 MG: 10 TABLET, FILM COATED ORAL at 17:37

## 2025-01-01 RX ADMIN — PANTOPRAZOLE SODIUM 40 MG: 40 INJECTION, POWDER, FOR SOLUTION INTRAVENOUS at 03:11

## 2025-01-01 RX ADMIN — PANTOPRAZOLE SODIUM 40 MG: 40 INJECTION, POWDER, FOR SOLUTION INTRAVENOUS at 02:35

## 2025-01-01 RX ADMIN — ALBUMIN (HUMAN) 25 G: 0.25 INJECTION, SOLUTION INTRAVENOUS at 09:06

## 2025-01-01 RX ADMIN — BUMETANIDE 2 MG: 0.25 INJECTION INTRAMUSCULAR; INTRAVENOUS at 22:43

## 2025-01-01 RX ADMIN — CALCIUM GLUCONATE 1000 MG: 20 INJECTION, SOLUTION INTRAVENOUS at 11:06

## 2025-01-01 RX ADMIN — ALBUMIN (HUMAN) 25 G: 0.25 INJECTION, SOLUTION INTRAVENOUS at 16:27

## 2025-01-01 RX ADMIN — LORAZEPAM 1 MG: 2 INJECTION INTRAMUSCULAR; INTRAVENOUS at 13:45

## 2025-01-01 RX ADMIN — CEFEPIME 1000 MG: 1 INJECTION, POWDER, FOR SOLUTION INTRAMUSCULAR; INTRAVENOUS at 17:08

## 2025-01-01 RX ADMIN — ALBUMIN (HUMAN) 25 G: 0.25 INJECTION, SOLUTION INTRAVENOUS at 15:08

## 2025-01-01 RX ADMIN — ALBUMIN (HUMAN) 25 G: 0.25 INJECTION, SOLUTION INTRAVENOUS at 09:58

## 2025-01-01 RX ADMIN — SODIUM BICARBONATE 650 MG: 650 TABLET ORAL at 21:32

## 2025-01-01 RX ADMIN — BUMETANIDE 2 MG: 0.25 INJECTION INTRAMUSCULAR; INTRAVENOUS at 22:10

## 2025-01-01 RX ADMIN — PANTOPRAZOLE SODIUM 40 MG: 40 INJECTION, POWDER, FOR SOLUTION INTRAVENOUS at 16:20

## 2025-01-01 RX ADMIN — ONDANSETRON 4 MG: 2 INJECTION, SOLUTION INTRAMUSCULAR; INTRAVENOUS at 12:39

## 2025-01-01 RX ADMIN — CEFEPIME 1000 MG: 1 INJECTION, POWDER, FOR SOLUTION INTRAMUSCULAR; INTRAVENOUS at 03:15

## 2025-01-01 RX ADMIN — ALBUMIN (HUMAN) 25 G: 0.25 INJECTION, SOLUTION INTRAVENOUS at 09:10

## 2025-01-01 RX ADMIN — ALBUMIN (HUMAN) 25 G: 0.25 INJECTION, SOLUTION INTRAVENOUS at 22:03

## 2025-01-01 RX ADMIN — EPOETIN ALFA 20000 UNITS: 20000 SOLUTION INTRAVENOUS; SUBCUTANEOUS at 18:52

## 2025-01-01 SDOH — HEALTH STABILITY: PHYSICAL HEALTH: ON AVERAGE, HOW MANY DAYS PER WEEK DO YOU ENGAGE IN MODERATE TO STRENUOUS EXERCISE (LIKE A BRISK WALK)?: 0 DAYS

## 2025-01-01 SDOH — ECONOMIC STABILITY: FOOD INSECURITY: WITHIN THE PAST 12 MONTHS, THE FOOD YOU BOUGHT JUST DIDN'T LAST AND YOU DIDN'T HAVE MONEY TO GET MORE.: NEVER TRUE

## 2025-01-01 SDOH — ECONOMIC STABILITY: INCOME INSECURITY: IN THE LAST 12 MONTHS, WAS THERE A TIME WHEN YOU WERE NOT ABLE TO PAY THE MORTGAGE OR RENT ON TIME?: NO

## 2025-01-01 SDOH — ECONOMIC STABILITY: FOOD INSECURITY: WITHIN THE PAST 12 MONTHS, YOU WORRIED THAT YOUR FOOD WOULD RUN OUT BEFORE YOU GOT MONEY TO BUY MORE.: NEVER TRUE

## 2025-01-01 SDOH — ECONOMIC STABILITY: TRANSPORTATION INSECURITY
IN THE PAST 12 MONTHS, HAS LACK OF TRANSPORTATION KEPT YOU FROM MEETINGS, WORK, OR FROM GETTING THINGS NEEDED FOR DAILY LIVING?: NO

## 2025-01-01 SDOH — ECONOMIC STABILITY: TRANSPORTATION INSECURITY
IN THE PAST 12 MONTHS, HAS THE LACK OF TRANSPORTATION KEPT YOU FROM MEDICAL APPOINTMENTS OR FROM GETTING MEDICATIONS?: NO

## 2025-01-01 SDOH — HEALTH STABILITY: PHYSICAL HEALTH: ON AVERAGE, HOW MANY MINUTES DO YOU ENGAGE IN EXERCISE AT THIS LEVEL?: 20 MIN

## 2025-01-01 ASSESSMENT — PAIN SCALES - GENERAL
PAINLEVEL_OUTOF10: 0

## 2025-01-01 ASSESSMENT — LIFESTYLE VARIABLES
HOW MANY STANDARD DRINKS CONTAINING ALCOHOL DO YOU HAVE ON A TYPICAL DAY: PATIENT DOES NOT DRINK
HOW OFTEN DO YOU HAVE A DRINK CONTAINING ALCOHOL: 1
HOW MANY STANDARD DRINKS CONTAINING ALCOHOL DO YOU HAVE ON A TYPICAL DAY: 0
HOW OFTEN DO YOU HAVE SIX OR MORE DRINKS ON ONE OCCASION: 1
HOW OFTEN DO YOU HAVE A DRINK CONTAINING ALCOHOL: NEVER

## 2025-01-01 ASSESSMENT — PATIENT HEALTH QUESTIONNAIRE - PHQ9
SUM OF ALL RESPONSES TO PHQ QUESTIONS 1-9: 1
2. FEELING DOWN, DEPRESSED OR HOPELESS: NOT AT ALL
SUM OF ALL RESPONSES TO PHQ QUESTIONS 1-9: 1
SUM OF ALL RESPONSES TO PHQ QUESTIONS 1-9: 1
1. LITTLE INTEREST OR PLEASURE IN DOING THINGS: SEVERAL DAYS
SUM OF ALL RESPONSES TO PHQ QUESTIONS 1-9: 1

## 2025-01-01 ASSESSMENT — ENCOUNTER SYMPTOMS: SHORTNESS OF BREATH: 1

## 2025-01-02 RX ORDER — GLIMEPIRIDE 1 MG/1
1 TABLET ORAL
Qty: 90 TABLET | Refills: 0 | Status: CANCELLED | OUTPATIENT
Start: 2025-01-02

## 2025-01-02 RX ORDER — AMLODIPINE BESYLATE 10 MG/1
10 TABLET ORAL DAILY
Qty: 90 TABLET | Refills: 1 | Status: SHIPPED | OUTPATIENT
Start: 2025-01-02

## 2025-01-02 RX ORDER — SODIUM BICARBONATE 650 MG/1
TABLET ORAL 2 TIMES DAILY
Status: CANCELLED | OUTPATIENT
Start: 2025-01-02

## 2025-01-02 RX ORDER — LOSARTAN POTASSIUM 25 MG/1
25 TABLET ORAL DAILY
Qty: 30 TABLET | Status: CANCELLED | OUTPATIENT
Start: 2025-01-02

## 2025-01-02 RX ORDER — CARVEDILOL 25 MG/1
25 TABLET ORAL 2 TIMES DAILY
Qty: 180 TABLET | Refills: 1 | Status: SHIPPED | OUTPATIENT
Start: 2025-01-02

## 2025-01-02 RX ORDER — FUROSEMIDE 40 MG/1
40 TABLET ORAL DAILY
Qty: 90 TABLET | Refills: 1 | Status: SHIPPED | OUTPATIENT
Start: 2025-01-02

## 2025-01-02 NOTE — TELEPHONE ENCOUNTER
Amlodipine and carvedilol refilled.  Glimepiride should be managed by Dr. Mustafa.   Losartan and sodium bicarbonate are not prescribed by our office.

## 2025-01-23 ENCOUNTER — OFFICE VISIT (OUTPATIENT)
Age: 80
End: 2025-01-23

## 2025-01-23 VITALS
HEART RATE: 92 BPM | DIASTOLIC BLOOD PRESSURE: 72 MMHG | RESPIRATION RATE: 16 BRPM | OXYGEN SATURATION: 99 % | SYSTOLIC BLOOD PRESSURE: 133 MMHG | HEIGHT: 66 IN | BODY MASS INDEX: 18.71 KG/M2

## 2025-01-23 DIAGNOSIS — R63.4 WEIGHT LOSS: ICD-10-CM

## 2025-01-23 DIAGNOSIS — E05.00 GRAVES DISEASE: Primary | ICD-10-CM

## 2025-01-23 RX ORDER — METHIMAZOLE 5 MG/1
2.5 TABLET ORAL DAILY
Qty: 45 TABLET | Refills: 3 | Status: SHIPPED | OUTPATIENT
Start: 2025-01-23

## 2025-01-23 NOTE — PROGRESS NOTES
Chief Complaint   Patient presents with    Thyroid Problem        History of Present Illness: Jessica Smart is a 79 y.o. female with a past medical history significant for type 2 diabetes and peripheral  vascular disease seen in referral from Tiffanie Zapien MD for discussion related to Graves' disease.      Initial visit:   Jessica reports that she has been eating \" constantly\" but despite this she has been losing weight.  Started taking methimazole 10 mg once daily in April.  Reports that both insulin and sulfonylurea  therapy were recently discontinued due to morning hypoglycemia.  Endorses severe diarrhea that wakes her up from sleep.  Denies tremor currently.  There is no known family history of thyroid dysfunction.  Her son lives nearby and helps her out.  Does  endorse dry eyes.      10/05/2022:Dropped to 5mg daily MMI in June- was instructed to have labs done 2 weeks later, never done. Tiffanie Zapien MD moved, Yuly Brenner  is new PCP- seeing her in December. Sam Geiger MD. BG was 70 this AM- nothing >100. Continues to lose weight. Tiffanie Zapien MD ordered a hemoccoult that was never done. Has received IV iron with nephrology.      11/09/2022: BG 88 this AM, 67 yesterday AM, 77, 74, 88, 82, 100, 82, 88, 74, 77, 88. MD Juanjo- said didn't need iron. Appetite is down at lunch. Continues to have diarrhea. Trying to eat snack between  10/10:30, again 2:30-3, 7:30-8. Protein shakes, jello, wheat thins and cheese.     03/05/2024: Eating well, weight has increased from last visit- BG never <70 with full tablet of glimepiride. Daughter in Law is cooking meals for her, sleeping well from 6PM- midnight.     01/23/2025: Thinks she is losing weight. Now low BG. Discussed multiple options for high fat/protein but low carb meals.        Current Outpatient Medications:     furosemide (LASIX) 40 MG tablet, Take 1 tablet by mouth daily, Disp: 90 tablet, Rfl: 1    amLODIPine (NORVASC) 10 MG

## 2025-01-28 RX ORDER — CLONIDINE HYDROCHLORIDE 0.2 MG/1
0.2 TABLET ORAL 2 TIMES DAILY
Qty: 180 TABLET | Refills: 0 | OUTPATIENT
Start: 2025-01-28

## 2025-02-17 RX ORDER — GLIMEPIRIDE 1 MG/1
1 TABLET ORAL
Qty: 90 TABLET | Refills: 0 | Status: SHIPPED | OUTPATIENT
Start: 2025-02-17

## 2025-02-17 RX ORDER — METHIMAZOLE 5 MG/1
2.5 TABLET ORAL DAILY
Qty: 45 TABLET | Refills: 3 | Status: SHIPPED | OUTPATIENT
Start: 2025-02-17

## 2025-03-10 RX ORDER — CARVEDILOL 25 MG/1
25 TABLET ORAL 2 TIMES DAILY
Qty: 180 TABLET | Refills: 1 | Status: SHIPPED | OUTPATIENT
Start: 2025-03-10

## 2025-03-11 RX ORDER — METHIMAZOLE 5 MG/1
2.5 TABLET ORAL DAILY
Qty: 45 TABLET | Refills: 3 | Status: SHIPPED | OUTPATIENT
Start: 2025-03-11

## 2025-03-13 ENCOUNTER — TELEPHONE (OUTPATIENT)
Dept: PRIMARY CARE CLINIC | Facility: CLINIC | Age: 80
End: 2025-03-13

## 2025-03-13 NOTE — TELEPHONE ENCOUNTER
Attempted to contact patient regarding upcoming Medicare wellness appointment and completion of HRA questionnaire. LVM for patient to please return call at  [phone], mcm sent as well.

## 2025-03-14 RX ORDER — FUROSEMIDE 40 MG/1
40 TABLET ORAL DAILY
Qty: 90 TABLET | Refills: 1 | Status: SHIPPED | OUTPATIENT
Start: 2025-03-14

## 2025-03-17 PROBLEM — A41.9 SEVERE SEPSIS: Status: ACTIVE | Noted: 2025-01-01

## 2025-03-17 PROBLEM — R65.20 SEVERE SEPSIS: Status: ACTIVE | Noted: 2025-01-01

## 2025-03-17 NOTE — CONSENT
Informed Consent for Blood Component Transfusion Note    I have discussed with the patient and son the rationale for blood component transfusion; its benefits in treating or preventing fatigue, organ damage, or death; and its risk which includes mild transfusion reactions, rare risk of blood borne infection, or more serious but rare reactions. I have discussed the alternatives to transfusion, including the risk and consequences of not receiving transfusion. The patient and son had an opportunity to ask questions and had agreed to proceed with transfusion of blood components.    Electronically signed by Hitesh Wilson DO on 3/17/25 at 12:31 PM EDT

## 2025-03-17 NOTE — PROGRESS NOTES
\"Have you been to the ER, urgent care clinic since your last visit?  Hospitalized since your last visit?\"    no    “Have you seen or consulted any other health care providers outside our system since your last visit?”    Kidney             
dehydration, shock, and  hyperglycemic-hyperosmolar state, amongst others.  Venous or arterial  collection is the recommended specimen for testing these patients.      Performed by: 03/17/2025 KING Renetta   Final        REVIEW OF SYSTEMS   Review of Systems   Constitutional:  Negative for chills, diaphoresis, fatigue and fever.   Respiratory:  Positive for shortness of breath.    Cardiovascular:  Negative for chest pain.   Gastrointestinal:         +fecal incontinence   Genitourinary:         +urinary incontinence   Neurological:         +falls   Psychiatric/Behavioral:  Positive for hallucinations.        PHYSICAL EXAM   BP (!) 100/46 (BP Site: Left Upper Arm, Patient Position: Sitting, BP Cuff Size: Large Adult)   Pulse 51   Temp (!) 95.5 °F (35.3 °C) (Temporal)   Resp 20   Ht 1.651 m (5' 5\")   SpO2 99%   BMI 19.00 kg/m²      Physical Exam  Vitals and nursing note reviewed.   Constitutional:       General: She is not in acute distress.     Appearance: Normal appearance. She is not diaphoretic.   HENT:      Head: Normocephalic and atraumatic.      Right Ear: External ear normal.      Left Ear: External ear normal.      Mouth/Throat:      Pharynx: Oropharynx is clear.   Eyes:      Extraocular Movements: Extraocular movements intact.      Conjunctiva/sclera: Conjunctivae normal.      Pupils: Pupils are equal, round, and reactive to light.   Cardiovascular:      Heart sounds: Normal heart sounds.   Neurological:      Mental Status: She is alert.      Gait: Gait abnormal (wheelchair dependence).   Psychiatric:         Mood and Affect: Mood normal.         Behavior: Behavior normal.          ASSESSMENT/ PLAN   Below is the assessment and plan developed based on review of pertinent history, physical exam, labs, studies, and medications.        1. Hallucinations  2. Low body temperature  3. Hypotension, unspecified hypotension type  4. Falls  5. Urinary incontinence, unspecified type  6. Incontinence of feces,

## 2025-03-17 NOTE — PROGRESS NOTES
Pharmacist Note - Vancomycin Dosing    Consult provided for this 79 y.o. female for indication of Sepsis of Unknown etiology  +possible sources UTI/SSTI/Bloodstream  .  Antibiotic regimen(s): Vancomycin, Cefepime (Once Ceftriaxone at [facility])  Patient on vancomycin PTA? NO   Pregnancy status: N/A    Recent Labs     25  1134   WBC 9.9   CREATININE 2.78*   BUN 73*     Frequency of BMP: Daily x 3 ordered per protocol  Height: 165.1 cm  Weight: 63 kg  Est CrCl: 14 ml/min; UO: - ml/kg/hr  Temp (24hrs), Av.9 °F (34.9 °C), Min:93.4 °F (34.1 °C), Max:96.4 °F (35.8 °C)    Cultures:  3/17: Bcx (x1) - In process     MRSA Swab ordered (if applicable)? YES    The plan below is expected to result in a target range of Trough 10-15 mcg/mL    Therapy will be initiated with a loading dose of 1500 mg IV x 1. Will follow with dosing by levels at this time given renal function. A 24 hour vancomycin random has been ordered to be collected 3/18 to assess clearance/appropriateness of next dose. Pharmacy to follow patient daily and order levels / make dose adjustments as appropriate.    Kushal Loredo, PharmD  [phone]

## 2025-03-17 NOTE — ED TRIAGE NOTES
ED triage note: arrives in wheelchair accompanied. Patient reports was sent from COLE Cedeno upstairs to ED for low blood pressure. Patient reports has been having hallucinations x 2 weeks.

## 2025-03-17 NOTE — ED PROVIDER NOTES
EMERGENCY DEPARTMENT PHYSICIAN NOTE     Patient: Jessica Smart     Time of Service: 3/17/2025 10:02 AM     Chief complaint:   Chief Complaint   Patient presents with    Hallucinations        HISTORY:  Patient is a 79 y.o. female who presents to the emergency department with complaints of hallucinations, AMS, low bp.        Past Medical History:   Diagnosis Date    Cerebrovascular accident (CVA) due to embolism of cerebral artery (HCC) 6/26/2020    Diabetes (HCC)     Hypertension     Peripheral vascular disease due to secondary diabetes (HCC) 1/28/2019        Past Surgical History:   Procedure Laterality Date    ABOVE KNEE AMPUTATION  02/06/2017    GYN      hysterectomy    LEG AMPUTATION BELOW KNEE          Family History   Problem Relation Age of Onset    Parkinson's Disease Brother     Diabetes Brother     Heart Disease Father     Diabetes Father     Diabetes Mother     Diabetes Brother     Diabetes Brother         Social History     Socioeconomic History    Marital status:    Tobacco Use    Smoking status: Former     Current packs/day: 0.50     Average packs/day: 0.5 packs/day for 61.2 years (30.6 ttl pk-yrs)     Types: Cigarettes     Start date: 1964     Passive exposure: Never    Smokeless tobacco: Former    Tobacco comments:     Patient said she had started smoking a .50 a pack daily in her 20s, she quit in her early thirties.    Vaping Use    Vaping status: Never Used   Substance and Sexual Activity    Alcohol use: No    Drug use: No    Sexual activity: Not Currently     Partners: Male     Social Drivers of Health     Financial Resource Strain: Low Risk  (11/20/2024)    Overall Financial Resource Strain (CARDIA)     Difficulty of Paying Living Expenses: Not hard at all   Food Insecurity: No Food Insecurity (3/17/2025)    Hunger Vital Sign     Worried About Running Out of Food in the Last Year: Never true     Ran Out of Food in the Last Year: Never true   Transportation Needs: No Transportation      --   --   --   --  183    < > = values in this interval not displayed.       - Hypotension or Lactic Acidosis present (SBP<90, MAP<65, Lactate>=4): NO      Procedures       DISPOSITION: Admitted 03/17/2025 12:54:43 PM    CLINICAL IMPRESSION:   1. Hypothermia, initial encounter    2. Acute cystitis without hematuria    3. Elevated lactic acid level    4. Hypocalcemia    5. Chest pain, unspecified type         Further personalized recommendations for outpatient care as below.        Prescriptions provided to the patient:     Current Discharge Medication List           Hitesh Wilson DO   Emergency Medicine Attending Physician             Hitesh Wilson DO  03/18/25 [id number]

## 2025-03-17 NOTE — ED NOTES
TRANSFER - OUT REPORT:    Verbal report given to ALBINA Ma on Jessica Smart  being transferred to ER for routine progression of patient care       Report consisted of patient's Situation, Background, Assessment and   Recommendations(SBAR).     Information from the following report(s) Nurse Handoff Report, ED Encounter Summary, ED SBAR, MAR, and Cardiac Rhythm Sinus Andrea  was reviewed with the receiving nurse.    [place] Fall Assessment:    Presents to emergency department  because of falls (Syncope, seizure, or loss of consciousness): No  Age > 70: Yes  Altered Mental Status, Intoxication with alcohol or substance confusion (Disorientation, impaired judgment, poor safety awaremess, or inability to follow instructions): Yes  Impaired Mobility: Ambulates or transfers with assistive devices or assistance; Unable to ambulate or transer.: Yes  Nursing Judgement: Yes          Lines:   Peripheral IV 03/17/25 Left;Dorsal Forearm (Active)       Peripheral IV 03/17/25 Left Antecubital (Active)        Opportunity for questions and clarification was provided.      Patient transported with:  MonitorJarek

## 2025-03-17 NOTE — ED NOTES
Patient straight cath for urine. Patient showed no sign of acute distress during cath. 300 cc drained from bladder. Patient's brief changed. Bed pad changed. Rectal temp obtained. MD made aware.

## 2025-03-17 NOTE — H&P
ascites, and moderate to severe anasarca. End-stage renal atrophy.   Cardiomegaly.      Electronically signed by James Guerra      XR CHEST PORTABLE   Final Result      Mild pulmonary edema with left pleural effusion and/or subsegmental atelectasis         Electronically signed by Krista Bose      CT Head W/O Contrast   Final Result      No acute process.      Electronically signed by Jamil Vides           ECG/ECHO:  [unfilled]       Notes reviewed from all clinical/nonclinical/nursing services involved in patient's clinical care. Care coordination discussions were held with appropriate clinical/nonclinical/ nursing providers based on care coordination needs.     Assessment:   Given the patient's current clinical presentation, there is a high level of concern for decompensation if discharged from the emergency department. Complex decision making was performed, which includes reviewing the patient's available past medical records, laboratory results, and imaging studies.    Principal Problem:    Severe sepsis (HCC)  Resolved Problems:    * No resolved hospital problems. *      Plan:   Severe sepsis (evidenced by hypothermia, lactic acidosis, acute metabolic encephalopathy) likely due to urinary tract infection  Hypothermia  Acute metabolic encephalopathy  UTI suggested by pyuria of 10-20, moderate leukocyte esterase, 2+ bacteriuria  -Admit to intermediate care.  Patient currently mentating okay and hemodynamically stable.  -Status post 2 L of RL bolus.  Hold off further IV fluid as she has anasarca, continue with IV albumin  -She has received a dose of IV ceftriaxone. Expand empiric antibiotics to include cefepime and vancomycin.  Although UA is consistent with UTI, patient is presenting with severe sepsis, she has blisters on the right lower extremity therefore skin infection as well as bloodstream infection could not be ruled out.  -Continue supportive therapy with Jarek hugger, IV colloids  -Serial labs  Contact Information  Primary Emergency Contact: Santiago Curtis   [place]  Mobile Phone: [phone]  Relation: Child  Secondary Emergency Contact: Charlie Salmon  Mobile Phone: [phone]  Relation: Child      CRITICAL CARE WAS PERFORMED FOR THIS ENCOUNTER: YES. I had a face to face encounter with the patient, reviewed and interpreted patient data including clinical events, labs, images, vital signs, I/O's, and examined patient.  I have discussed the case and the plan and management of the patient's care with the consulting services, the bedside nurses and necessary ancillary providers.  This patient has a high probability of imminent, clinically significant deterioration, which requires the highest level of preparedness to intervene urgently. I participated in the decision-making and personally managed or directed the management of the following life and organ supporting interventions that required my frequent assessment to treat or prevent imminent deterioration.  I personally spent 40 minutes of critical care time.  This is time spent at this critically ill patient's bedside actively involved in patient care as well as the coordination of care and discussions with the patient's family.  This does not include any procedural time which has been billed separately.      Signed By: Del Young MD     March 17, 2025         Please note that this dictation may have been completed with Dragon, the [employer] voice recognition software.  Quite often unanticipated grammatical, syntax, homophones, and other interpretive errors are inadvertently transcribed by the computer software.  Please disregard these errors.  Please excuse any errors that have escaped final proofreading.

## 2025-03-17 NOTE — PROGRESS NOTES
Day #1 of Cefepime  Indication:  sepsis  Current regimen:  2 gram q12h    Recent Labs     25  1134   WBC 9.9   CREATININE 2.78*   BUN 73*     Est CrCl: 14 ml/min  Temp (24hrs), Av.9 °F (34.9 °C), Min:93.4 °F (34.1 °C), Max:96.4 °F (35.8 °C)    Plan: Change to 2 grams x 1 followed by 1 gram q12h for CrCl 11-29 ml/min

## 2025-03-17 NOTE — PROGRESS NOTES
Spiritual Health History and Assessment/Progress Note  [facility]    Initial Encounter,  ,  ,      Name: Jessica Smart MRN: [id number]    Age: 79 y.o.     Sex: female   Language: English   Mormonism: Buddhist   Severe sepsis (HCC)     Date: 3/17/2025            Total Time Calculated: 10 min              Spiritual Assessment began in [facility]        Referral/Consult From: Rounding   Encounter Overview/Reason: Initial Encounter  Service Provided For: Family    Nuvia, Belief, Meaning:   Patient identifies as spiritual and has beliefs or practices that help with coping during difficult times  Family/Friends identify as spiritual, are connected with a nuvia tradition or spiritual practice, and have beliefs or practices that help with coping during difficult times      Importance and Influence:  Patient unable to assess at this time  Family/Friends have no beliefs influential to healthcare decision-making identified during this visit    Community:  Patient   Family/Friends feel well-supported. Support system includes: Children, [place]uvia Community, and Extended family    Assessment and Plan of Care:     Patient Interventions include: Other:    Family/Friends Interventions include: Facilitated expression of thoughts and feelings and Affirmed coping skills/support systems    Patient Plan of Care: Spiritual Care available upon further referral  Family/Friends Plan of Care: Spiritual Care available upon further referral    I visited Jessica Smart for an initial spiritual health assessment.     The patient was unable to join the conversation as she was being made ready for her trip to [facility]. I met one of her sons, Santiago Salmon out side of her room. The patient lives in her home. Santiago visits her every morning and evening in support of her daily activities. He is a  of a local [employer]. Her other son lives in [place] and will visit later this week.     Electronically signed by MEENA GONZALEZ   on 3/17/2025 at 2:13 PM

## 2025-03-17 NOTE — PLAN OF CARE
Pt admitted to the floor, blood ready, Mg critically low, MD notified. Orders in place.  Pt restarted on Jarek hugger at 38 degrees current 95 rectal temp.  Will continue to monitor.        Problem: Safety - Adult  Goal: Free from fall injury  Outcome: Progressing     Problem: Chronic Conditions and Co-morbidities  Goal: Patient's chronic conditions and co-morbidity symptoms are monitored and maintained or improved  Outcome: Progressing     Problem: Discharge Planning  Goal: Discharge to home or other facility with appropriate resources  Outcome: Progressing     Problem: Skin/Tissue Integrity  Goal: Skin integrity remains intact  Description: 1.  Monitor for areas of redness and/or skin breakdown  2.  Assess vascular access sites hourly  3.  Every 4-6 hours minimum:  Change oxygen saturation probe site  4.  Every 4-6 hours:  If on nasal continuous positive airway pressure, respiratory therapy assess nares and determine need for appliance change or resting period  Outcome: Progressing     Problem: Skin/Tissue Integrity - Adult  Goal: Skin integrity remains intact  Description: 1.  Monitor for areas of redness and/or skin breakdown  2.  Assess vascular access sites hourly  3.  Every 4-6 hours minimum:  Change oxygen saturation probe site  4.  Every 4-6 hours:  If on nasal continuous positive airway pressure, respiratory therapy assess nares and determine need for appliance change or resting period  Outcome: Progressing     Problem: Metabolic/Fluid and Electrolytes - Adult  Goal: Electrolytes maintained within normal limits  Outcome: Progressing  Goal: Hemodynamic stability and optimal renal function maintained  Outcome: Progressing  Goal: Glucose maintained within prescribed range  Outcome: Progressing     Problem: Hematologic - Adult  Goal: Maintains hematologic stability  Outcome: Progressing

## 2025-03-18 NOTE — PROGRESS NOTES
Hospitalist Night Cover     Name: Jessica Smart  YOB: 1945      Overnight update:        Jessica Smart is an 80yo with a pmhx acute on chronic anemia, T2DM, anasarca, CKD stage IV, hypothyroidism and HTN who is admitted with severe sepsis.     Notified by RN that patient short of breath, Desatting- Placed NRB   Seen and examined patient at bedside. Appears in mild respiratory distress, using accessory muscles. Lungs coarse throughout to auscultation.     Acute respiratory failure   Likely secondary to fluid overload  - Received 2L IV fluids and 1 unit PRBCs  - Chest xray earlier showed pulmonary edema, will repeat   - Duo nebs   - Lasix 40mg IV now   - Place on Bipap     Patient at high risk for decompensation and may require a higher level of care.      Mirtha Robbins [facility]-NP

## 2025-03-18 NOTE — WOUND CARE
WOCN Note:     New consult placed for \"Wound on LLE heel/toes. Sore on right thumb. Excoriation LLE\"    Assessed in 425/01.  Family at the bedside.    Jessica Smart is a 79 y.o. y/o female who presented for Hypocalcemia [E83.51]  Acute cystitis without hematuria [N30.00]  Elevated lactic acid level [R79.89]  Hypothermia, initial encounter [T68.XXXA]  Severe sepsis (HCC) [A41.9, R65.20]  Left BKA    Admitted on 3/17/2025; LOS: 1    Lab Results   Component Value Date/Time    WBC 17.7 (H) 03/18/2025 01:38 PM    LABA1C 5.4 03/18/2025 06:21 AM    POCGLU 94 03/18/2025 11:12 AM    POCGLU 95 03/18/2025 06:45 AM    HGB 8.3 (L) 03/18/2025 01:38 PM    HCT 26.1 (L) 03/18/2025 01:38 PM     03/18/2025 01:38 PM      Lab Results   Component Value Date/Time    WBC 17.7 (H) 03/18/2025 01:38 PM          Tobacco Use      Smoking status: Former        Packs/day: 0.50        Years: 0.5 packs/day for 61.2 years (30.6 ttl pk-yrs)        Types: Cigarettes        Start date: 1964        Passive exposure: Never      Smokeless tobacco: Former      Tobacco comments: Patient said she had started smoking a .50 a pack daily in her 20s, she quit in her early thirties.        Diet NPO     Assessment:   Patient is alert, communicative and requires assist with repositioning.    Bipap in place; applied protecta gel under bipap mask.  Bed: van gel  Patient reports no pain.   Patient repositioned on left side with pillow.  Right heels offloaded with pillows.     Sacrum and buttocks intact without erythema. Protected with transparent dressing.       Wound Assessment  POA Right low leg, partial thickness wounds : scattered over field 4 x 2 x 0.1 cm; 100% red; no exudate; no odor. Periwound without erythema.   TX: cleansed with saline; applied xeroform and foam dressing.      2.  POA Right great and second toe, dry eschar.  Tx:  painted with betadine    3.  POA Right heel, dry scab.  Tx:  covered with foam dressing.      Wound, Pressure

## 2025-03-18 NOTE — CARE COORDINATION
Care Management Initial Assessment       RUR: 19%  Readmission? No  1st IM letter given? Yes - 3/18  1st  letter given: No       03/18/25 1211   Service Assessment   Patient Orientation Alert and Oriented   Cognition Alert   History Provided By Child/Family  (kassandra Frausto, [phone])   Primary Caregiver Family   Accompanied By/Relationship kassandra Frausto, [phone]   Support Systems Children   PCP Verified by CM Yes  (Hallie Cedeno)   Last Visit to PCP Within last 3 months   Prior Functional Level Assistance with the following:;Mobility;Independent in ADLs/IADLs   Can patient return to prior living arrangement Unknown at present   Ability to make needs known: Fair   Family able to assist with home care needs: Yes   Would you like for me to discuss the discharge plan with any other family members/significant others, and if so, who? Yes  (Santiago Salmon son, [phone])   Financial Resources Medicare   Social/Functional History   Lives With Alone   Type of Home Apartment   Home Layout One level   Home Access Level entry   Bathroom Shower/Tub Tub/Shower unit;Shower chair with back   Bathroom Equipment Shower chair;Tub transfer bench;Hand-held shower;Toilet raiser   Home Equipment Wheelchair - Manual   Receives Help From Family   Prior Level of Assist for ADLs Independent   Prior Level of Assist for Homemaking Independent   Ambulation Assistance Non-ambulatory   Active  No   Patient's  Info son   Occupation Retired   Discharge Planning   Type of Residence Apartment   Living Arrangements Alone   Current Services Prior To Admission Durable Medical Equipment   Current DME Prior to Arrival Wheelchair   Potential Assistance Purchasing Medications No   Patient expects to be discharged to: Unknown     CM met with pt and pt's son (Santiago Salmon, [phone]) at bedside to confirm demographics and complete initial assessment. Pt on BiPAP and getting labs drawn, assessment completed with pt's son. Pt

## 2025-03-18 NOTE — PROGRESS NOTES
Pt has been complaining of sob since receiving bedside report. Pt was noted to be receiving 1 unit of RBC's. Pt repositioned in bed and placed on 3lnc due to desating to 87%. Pt maintaining in the 80's. Pt noted to have increased WOB. Pt placed a NRB. Pt seemingly did better and had no complaints. Pt is on th bear hugger due to hypothermia. Pt continuously removes bear hugger due to \"feeling too hot\".  Bp's were noted to be softer in the beginning of shift but have elevated. Last one reading 150's sbp. There was an attempt to remove the NRB due to pt's request. Pt quickly started complaining of feeling hot and sob.Blood transfusion was stopped. Pt's lunch sounds were assessed periodically and are now sounding course . NANCY Robbins notified and presented to bedside to assess pt.     01:12- CXR has been obtained. ABG being obtained. Pt placed on BiPAP with close monitoring due to pt attempting several times to removed device. Pt received lasix and a duo-neb. Cmu informed primary nurse pt has a 1st degree AVB.

## 2025-03-18 NOTE — PROGRESS NOTES
[facility]                                                                                          Hospitalist Progress Note  Michael Tenorio MD  Office Phone: [phone]        Date of Service:  3/18/2025  NAME:  Jesscia Smart  :  1945  MRN:  [id number]       Admission Summary:   79 y.o. female presented to the [place] [facility] ED today 3/17/2025 with hallucinations.  She was found to be in severe sepsis with hypothermia.  ED attending spoke with ICU who reportedly recommended admission to intermediate care under hospitalist as she is hemodynamically stable.  I requested patient be transferred to [facility] ED for expedited evaluation.  While at the [place] ED she received 2 L of Ringer's lactate and a dose of IV ceftriaxone.  She has not been hypotensive since she got to the ED.     Her PMH significant for CKD, type II DM on oral hypoglycemics, PAD status post left AKA, stroke, hypertension.   Interval history / Subjective:   Seen and examined. On bipap 70% FiO2, asking to eat.  Rapid response yesterday for respiratory failure due to acute pulmonary edema in the setting of fluid/PRBC resuscitation.      AM labs pending as of right now      Addendum: rapid response called ~6p for bradycardia in the 20s, pt seen and examined by me, she is less responsive, follows some simple commands, lethargic, remains on bipap. BP stable, HR 60s NSR on tele.   -check stat labs (cbc bmp mg trop lactic)  -respiratory status worsening, now on 100% FiO2  -check ABG, CXR  -updated son Santiago   -consulted and d/w ICU    Assessment & Plan:     Severe sepsis  UTI  Acute metabolic encephalopathy  Hypothermia  -continue broad-spectrum IV abx for now  -f/u blood cultures, add-on urine culture  -continue jennifer menjivar    Acute respiratory failure with hypoxia  Acute pulmonary edema due to IVF resuscitation  -wean bipap to high-flow O2 if possible  -give additional IV

## 2025-03-18 NOTE — SIGNIFICANT EVENT
Rapid Response  Rapid response room 425 called overhead at 1802. RRT responding.    Rapid response called for  bradycardia    CMU noted pt's HR in the 20's, hypotensive, unresponsive. Staff immediately to bedside, pulse palpable and improving without intervention. HR back into 60's, normotensive.    Dr. Tenorio at bedside, consulting ICU. Orders placed.    BRYANT Bass at bedside to evaluate. Pending CXR and labs, get ABG, will be available if needed.    Checked in with primary RN prior to leaving. Opportunity for questions and concerns provided.      Rapid Response Team Sepsis Screening  Is the patient's history suggestive of a new infection? No admitted for urosepsis, no concern for new infection at this time, w/u ongoing    Are two or more SIRS criteria present? Yes SIRS Criteria: Temperature < 36.0 C/96.8 F, Acutely altered mental status, Respiration > 20/min, and WBC > 12 k/mcL    Is there evidence of Organ Dysfunction? [facility] Sepsis OD: Hypotension (SBP < 90 or MAP < 65)  LA 0.9 at 1208, repeat at 1826 was 1.0    Communication with provider: No    Was a Code Sepsis called at this encounter? No. Why? Not indicated at this time

## 2025-03-18 NOTE — CONSULTS
[facility]    [address] [address]          GASTROENTEROLOGY CONSULTATION NOTE  Alexandrea Garcia PA-C  [phone] office  NP/PA in-hospital M-F until 4:30PM  After 5PM or on weekends, please call  for physician on call        NAME:  Jessica Smart   :   1945   MRN:   [id number]       Referring Physician: Coby    Consult Date: 3/18/2025 12:31 PM    Chief Complaint: GIB     History of Present Illness:  Patient is a 79 y.o. who is seen in consultation at the request of Dr. Tenorio for GIB. The patient has a past medical history significant for CKD, type II DM on oral hypoglycemics, PAD status post left AKA, stroke, hypertension. She initially presented for hallucinations, was found to be in severe sepsis with hypothermia. Today she is alert and oriented, feeling well but still requiring Bipap and warming blanket at bedside. Son also at bedside. Denies abdominal pain. No obvious hematochezia or melena. Mentions a hemorrhoid that was bleeding intermittently. No UGI symptoms: no heartburn, nausea, vomiting, or dysphagia. No EGD or colonoscopy history     I have reviewed the emergency room note, hospital admission note, notes by all other clinicians who have seen the patient during this hospitalization to date. I have reviewed the problem list and the reason for this hospitalization. I have reviewed the allergies and the medications the patient was taking at home prior to this hospitalization.    PMH:  Past Medical History:   Diagnosis Date    Cerebrovascular accident (CVA) due to embolism of cerebral artery (HCC) 2020    Diabetes (HCC)     Hypertension     Peripheral vascular disease due to secondary diabetes (HCC) 2019       PSH:  Past Surgical History:   Procedure Laterality Date    ABOVE KNEE AMPUTATION  2017    GYN      hysterectomy    LEG AMPUTATION BELOW KNEE         Allergies:  No Known Allergies    Home Medications:  Prior to Admission

## 2025-03-18 NOTE — PLAN OF CARE
Problem: Chronic Conditions and Co-morbidities  Goal: Patient's chronic conditions and co-morbidity symptoms are monitored and maintained or improved  3/18/2025 0310 by Kate Greenwood RN  Outcome: Not Progressing  3/17/2025 1829 by Manjula Mendes RN  Outcome: Progressing     Problem: Metabolic/Fluid and Electrolytes - Adult  Goal: Electrolytes maintained within normal limits  3/18/2025 0310 by Kate Greenwood RN  Outcome: Not Progressing  3/17/2025 1829 by Manjula Mendes RN  Outcome: Progressing     Problem: Safety - Adult  Goal: Free from fall injury  3/18/2025 0310 by Kate Greenwood RN  Outcome: Progressing  3/17/2025 1829 by Manjula Mendes RN  Outcome: Progressing     Problem: Discharge Planning  Goal: Discharge to home or other facility with appropriate resources  3/17/2025 1829 by Manjula Mendes RN  Outcome: Progressing     Problem: Skin/Tissue Integrity  Goal: Skin integrity remains intact  Description: 1.  Monitor for areas of redness and/or skin breakdown  2.  Assess vascular access sites hourly  3.  Every 4-6 hours minimum:  Change oxygen saturation probe site  4.  Every 4-6 hours:  If on nasal continuous positive airway pressure, respiratory therapy assess nares and determine need for appliance change or resting period  3/18/2025 0310 by Kate Greenwood RN  Outcome: Progressing  3/17/2025 1829 by Manjula Mendes RN  Outcome: Progressing     Problem: Skin/Tissue Integrity - Adult  Goal: Skin integrity remains intact  Description: 1.  Monitor for areas of redness and/or skin breakdown  2.  Assess vascular access sites hourly  3.  Every 4-6 hours minimum:  Change oxygen saturation probe site  4.  Every 4-6 hours:  If on nasal continuous positive airway pressure, respiratory therapy assess nares and determine need for appliance change or resting period  3/18/2025 0310 by Kate Greenwood RN  Outcome: Progressing  3/17/2025 1829 by Manjula Mendes RN  Outcome: Progressing     Problem: Metabolic/Fluid and  Electrolytes - Adult  Goal: Hemodynamic stability and optimal renal function maintained  3/18/2025 0310 by Kate Greenwood RN  Outcome: Progressing  3/17/2025 1829 by Manjula Mendes RN  Outcome: Progressing  Goal: Glucose maintained within prescribed range  3/18/2025 0310 by Kate Greenwood RN  Outcome: Progressing  3/17/2025 1829 by Manjula Mendes RN  Outcome: Progressing     Problem: Hematologic - Adult  Goal: Maintains hematologic stability  3/17/2025 1829 by Manjula Mendes RN  Outcome: Progressing     Problem: Chronic Conditions and Co-morbidities  Goal: Patient's chronic conditions and co-morbidity symptoms are monitored and maintained or improved  3/18/2025 0310 by Kate Greenwood RN  Outcome: Not Progressing  3/17/2025 1829 by Manjula Mendes RN  Outcome: Progressing     Problem: Metabolic/Fluid and Electrolytes - Adult  Goal: Electrolytes maintained within normal limits  3/18/2025 0310 by Kate Greenwood RN  Outcome: Not Progressing  3/17/2025 1829 by Manjula Mendes RN  Outcome: Progressing

## 2025-03-18 NOTE — PROGRESS NOTES
Attempted to tier HFNC but patient did not toileted, SATs drop and WOB increased ans patient state it is hard to breath. Place back on BIPAP. BIPAP setting are 12/6 and 90%.

## 2025-03-18 NOTE — CONSULTS
[facility]   [address], [place] [address]  Phone: [phone]   [phone]     [email or web address]    NEPHROLOGY CONSULT NOTE     Patient: Jessica Smart MRN: [id number]  PCP: Hallie Cedeno PA-C   :     1945  Age:   79 y.o.  Sex:  female      Referring physician: Michael Tenorio MD  Reason for consultation: 79 y.o. female with CKD  Admission Date: 3/17/2025 10:02 AM  LOS: 1 day      ASSESSMENT and PLAN :   CKD Stage IV   - progressive CKD ? ESRD  - CT abdomen : severely atrophic Kidneys, no Hydro   - previously non nephrotic proteinuria   - appears to be a poor candidate for dialysis   - start IV albumin w/ Bumex   - daily labs and strict I.O    Anasarca w/ effusions, ascites  Hypoalbuminemia ? Volume overload   Hypernatremia ? Loop diuretic use   - IV albumin and Bumex as above   - Echo pending   - ordered Gammopathy screen to r/o amyloid/ myeloma and UACR     Severe anemia   - normal iron, NCNC  - ? 2/2 ckd. R/o gammopathy   - start FLORECITA  - check stool occult     Acute resp failure   - on BIPAP     Hypothermia   - elevated TSH   - no obvious signs of sepsis --> avoid IVF due to anasarca     Met acidosis   - 2/2 CKD  - agree w/ oral Bicarb     Care Plan discussed with:  pt         Thank you for consulting [facility] in the care of your patient.      Subjective:   HPI: Jessica Smart is a 79 y.o.  female who has been admitted to the hospital for Hallucination   Fund to be hypothermic in ER and was given IVF and Rx for sepsis without obvious source   She was noted to have anasarca and severe degree ARF   She sees Dr Geiger for CKD and last seen in Nov. Notes in care everywhere   No prior discussion about dialysis  She is SOB. Denies any sputum. Denies any fevers/ chills/irigors    Past Medical Hx:   Past Medical History:   Diagnosis Date    Cerebrovascular accident (CVA) due to embolism of cerebral artery

## 2025-03-19 NOTE — PROGRESS NOTES
A Spiritual Care Partner Volunteer visited Jessica Smart at [facility] in [facility] 4 IMCU 2 on 3/19/2025     Documented by: Chaplain Ashley Vaughn

## 2025-03-19 NOTE — PLAN OF CARE
Problem: Safety - Adult  Goal: Free from fall injury  Outcome: Progressing     Problem: Chronic Conditions and Co-morbidities  Goal: Patient's chronic conditions and co-morbidity symptoms are monitored and maintained or improved  Outcome: Progressing     Problem: Discharge Planning  Goal: Discharge to home or other facility with appropriate resources  Outcome: Progressing     Problem: Skin/Tissue Integrity  Goal: Skin integrity remains intact  Description: 1.  Monitor for areas of redness and/or skin breakdown  2.  Assess vascular access sites hourly  3.  Every 4-6 hours minimum:  Change oxygen saturation probe site  4.  Every 4-6 hours:  If on nasal continuous positive airway pressure, respiratory therapy assess nares and determine need for appliance change or resting period  Outcome: Progressing     Problem: Skin/Tissue Integrity - Adult  Goal: Skin integrity remains intact  Description: 1.  Monitor for areas of redness and/or skin breakdown  2.  Assess vascular access sites hourly  3.  Every 4-6 hours minimum:  Change oxygen saturation probe site  4.  Every 4-6 hours:  If on nasal continuous positive airway pressure, respiratory therapy assess nares and determine need for appliance change or resting period  Outcome: Progressing     Problem: Metabolic/Fluid and Electrolytes - Adult  Goal: Electrolytes maintained within normal limits  Outcome: Progressing     Problem: Metabolic/Fluid and Electrolytes - Adult  Goal: Glucose maintained within prescribed range  Outcome: Progressing

## 2025-03-19 NOTE — PROGRESS NOTES
Pharmacist Note - Vancomycin Dosing  Therapy day 2  Indication: Sepsis of unclear source  Current regimen: Dosing by levels    Recent Labs     03/17/25  1134 03/18/25  0621 03/18/25  1338 03/18/25  1826   WBC 9.9  --  17.7* 14.6*   CREATININE 2.78* 2.56*  --  2.66*   BUN 73* 72*  --  75*       A random vancomycin level of 14.9 mcg/mL was obtained.    Goal target range Trough 10-15 mcg/mL      Plan: Will order vancomycin 500 mg x 1. Plan to order next vancomycin random 3/20 with AM labs. Pharmacy will continue to monitor this patient daily for changes in clinical status and renal function.

## 2025-03-19 NOTE — PROGRESS NOTES
RRT RN rounding on pt. This RN touched base with primary RN and was informed of need for second IV if possible. Pt remains stable, on BiPAP with RR in the 20s-30s. Oxygenation stable on 12/8, 100% FiO2.    Peripheral access attempted twice with no success. RN informed.  Pt left with callbell within reach, lights off per pt request, and door open d/t fall risk.

## 2025-03-19 NOTE — PROGRESS NOTES
[facility]                                                                                          Hospitalist Progress Note  Michael Tenorio MD  Office Phone: [phone]        Date of Service:  3/19/2025  NAME:  Jessica Smart  :  1945  MRN:  [id number]       Admission Summary:   79 y.o. female presented to the [facility] ED today 3/17/2025 with hallucinations.  She was found to be in severe sepsis with hypothermia.  ED attending spoke with ICU who reportedly recommended admission to intermediate care under hospitalist as she is hemodynamically stable.  I requested patient be transferred to [facility] ED for expedited evaluation.  While at the [facility] ED she received 2 L of Ringer's lactate and a dose of IV ceftriaxone.  She has not been hypotensive since she got to the ED.     Her PMH significant for CKD, type II DM on oral hypoglycemics, PAD status post left AKA, stroke, hypertension.   Interval history / Subjective:   Rapid response last night.  Continues deteriorating, mental status more lethargic, on maximal bipap support with borderline O2 sats, increased work of breathing.    Discussed at length with her son in person and other son over the phone, patient likely imminently expiring. One of her sons is coming from [place] tomorrow and they were hoping to keep her stable until his arrival, but she may not have that long. No further escalation of care available aside from ICU with intubation, etc. I advised against intubation, CPR (compressions, shocks), recommend DNR status. Family is agreeable. In the meantime will do our best to keep her stable on this level of care, but no further escalation.       Assessment & Plan:     Severe sepsis  UTI  Acute metabolic encephalopathy  Hypothermia  -continue broad-spectrum IV abx  -continue jennifer hugger PRN    Acute respiratory failure with hypoxia  Acute pulmonary edema due to IVF resuscitation  -wean bipap

## 2025-03-19 NOTE — PLAN OF CARE
Problem: Safety - Adult  Goal: Free from fall injury  Outcome: Progressing     Problem: Skin/Tissue Integrity  Goal: Skin integrity remains intact  Description: 1.  Monitor for areas of redness and/or skin breakdown  2.  Assess vascular access sites hourly  3.  Every 4-6 hours minimum:  Change oxygen saturation probe site  4.  Every 4-6 hours:  If on nasal continuous positive airway pressure, respiratory therapy assess nares and determine need for appliance change or resting period  Outcome: Progressing     Problem: Skin/Tissue Integrity - Adult  Goal: Skin integrity remains intact  Description: 1.  Monitor for areas of redness and/or skin breakdown  2.  Assess vascular access sites hourly  3.  Every 4-6 hours minimum:  Change oxygen saturation probe site  4.  Every 4-6 hours:  If on nasal continuous positive airway pressure, respiratory therapy assess nares and determine need for appliance change or resting period  Outcome: Progressing     Problem: Metabolic/Fluid and Electrolytes - Adult  Goal: Electrolytes maintained within normal limits  Outcome: Progressing  Goal: Hemodynamic stability and optimal renal function maintained  Outcome: Progressing  Goal: Glucose maintained within prescribed range  Outcome: Progressing

## 2025-03-19 NOTE — PROGRESS NOTES
HARDIK DAMON   [address] [address]       GI PROGRESS NOTE  Alexandrea Garcia PA-C  [phone] office  NP/PA in-hospital M-F until 4:30PM  After 5PM or on weekends, please call  for physician on call      NAME: Jessica Smart   :  1945   MRN:  [id number]       Subjective:     Patient resting in bed with son at bedside. Reports a bowel movement yesterday, does not think there was obvious bleeding. Mentions her hemorrhoids are bothering her. On Bipap, O2 sats drop when she talks.     Objective:     VITALS:   Last 24hrs VS reviewed since prior progress note. Most recent are:  Vitals:    25 0600   BP: (!) 109/45   Pulse: 65   Resp: (!) 32   Temp: (!) 96.3 °F (35.7 °C)   SpO2: 94%       PHYSICAL EXAM:  General: Cooperative, no acute distress    Neurologic:  Alert and oriented X 3.  HEENT: EOMI, no scleral icterus   Lungs:  CTA bilaterally. No wheezing  Heart:  S1 S2, regular rhythm  Abdomen: Soft, non-distended, no tenderness. +Bowel sounds  Extremities: No edema  Psych:   Good insight. Not anxious or agitated.    Lab Data Reviewed:     Recent Results (from the past 24 hours)   POCT Glucose    Collection Time: 25 11:12 AM   Result Value Ref Range    POC Glucose 94 65 - 117 mg/dL    Performed by: ANAND Chamorro    Lactic Acid    Collection Time: 25 12:08 PM   Result Value Ref Range    Lactic Acid, Plasma 0.9 0.4 - 2.0 MMOL/L   CBC with Auto Differential    Collection Time: 25  1:38 PM   Result Value Ref Range    WBC 17.7 (H) 3.6 - 11.0 K/uL    RBC 3.02 (L) 3.80 - 5.20 M/uL    Hemoglobin 8.3 (L) 11.5 - 16.0 g/dL    Hematocrit 26.1 (L) 35.0 - 47.0 %    MCV 86.4 80.0 - 99.0 FL    MCH 27.5 26.0 - 34.0 PG    MCHC 31.8 30.0 - 36.5 g/dL    RDW 16.4 (H) 11.5 - 14.5 %    Platelets 183 150 - 400 K/uL    MPV 11.1 8.9 - 12.9 FL    Nucleated RBCs 0.2 (H) 0  WBC    nRBC 0.04 (H) 0.00 - 0.01 K/uL    Neutrophils % 90.5 (H) 32.0 - 75.0 %    Lymphocytes % 6.9

## 2025-03-19 NOTE — PROGRESS NOTES
[address], [facility] [place] [address]  Phone: [phone]   [phone]    [email or web address]     Nephrology Progress Note    Patient Name : Jessica Smart      : 1945     MRN : [id number]  Date of Admission : 3/17/2025  Date of Servive : 25    CC:  Follow up for VAZQUEZ       Assessment and Plan   CKD Stage IV :  progressive CKD ? ESRD  Severe metabolic acidosis    Anasarca w/ effusions, ascites  Hypoalbuminemia ? Volume overload   Hypernatremia ? Loop diuretic use   Severe anemia   Acute resp failure : on BIPAP   Hypothermia   Encephalopathy     Plan :  - she is critically ill and unlikely to survive   - she is a poor candidate for dialysis given her age and co-morbidities   - d/w son--> agreeable to DNR/DNI and not to pursue dialysis        Care Plan discussed with:  pt  `s son and attg      Interval History:  Seen and examined. Events noted. On jennifer hugger, hypothermic, BIPAP and very ill looking     Review of Systems: A comprehensive review of systems was negative.    Current Medications:   Current Facility-Administered Medications   Medication Dose Route Frequency    sodium bicarbonate tablet 1,300 mg  1,300 mg Oral TID    calcium gluconate 1,000 mg in sodium chloride 50 mL  1,000 mg IntraVENous Once    albumin human 25% IV solution 25 g  25 g IntraVENous Q6H    bumetanide (BUMEX) injection 2 mg  2 mg IntraVENous q8h    ondansetron (ZOFRAN) injection 4 mg  4 mg IntraVENous Q6H PRN    epoetin vee (EPOGEN;PROCRIT) injection 20,000 Units  20,000 Units SubCUTAneous Once per day on     0.9 % sodium chloride infusion   IntraVENous PRN    0.9 % sodium chloride infusion   IntraVENous PRN    glucose chewable tablet 16 g  4 tablet Oral PRN    dextrose bolus 10% 125 mL  125 mL IntraVENous PRN    Or    dextrose bolus 10% 250 mL  250 mL IntraVENous PRN    glucagon injection 1 mg  1 mg SubCUTAneous PRN    dextrose 10 %

## 2025-03-20 NOTE — DEATH NOTES
Death Pronouncement Note  Patient's Name: Jessica Smart   Patient's YOB: 1945  MRN Number: [id number]    Admitting Provider: Del Young MD  Attending Provider: Lu Boyle MD    Patient was examined and the following were absent: Pulses and Respiratory effort and pupillary response    I declared the patient dead on 3/20/2025 at 2:16 PM    Preliminary Cause of Death: Acute pulmonary edema (HCC)     Electronically signed by Lu Boyle MD on 3/20/25 at 2:25 PM EDT

## 2025-03-20 NOTE — PLAN OF CARE
Problem: Safety - Adult  Goal: Free from fall injury  Outcome: Progressing  Flowsheets (Taken 3/20/2025 1006)  Free From Fall Injury:   Instruct family/caregiver on patient safety   Based on caregiver fall risk screen, instruct family/caregiver to ask for assistance with transferring infant if caregiver noted to have fall risk factors     Problem: Chronic Conditions and Co-morbidities  Goal: Patient's chronic conditions and co-morbidity symptoms are monitored and maintained or improved  Outcome: Progressing  Flowsheets (Taken 3/20/2025 0900)  Care Plan - Patient's Chronic Conditions and Co-Morbidity Symptoms are Monitored and Maintained or Improved:   Collaborate with multidisciplinary team to address chronic and comorbid conditions and prevent exacerbation or deterioration   Monitor and assess patient's chronic conditions and comorbid symptoms for stability, deterioration, or improvement     Problem: Discharge Planning  Goal: Discharge to home or other facility with appropriate resources  Outcome: Progressing  Flowsheets (Taken 3/20/2025 0900)  Discharge to home or other facility with appropriate resources:   Identify barriers to discharge with patient and caregiver   Arrange for needed discharge resources and transportation as appropriate     Problem: Skin/Tissue Integrity  Goal: Skin integrity remains intact  Description: 1.  Monitor for areas of redness and/or skin breakdown  2.  Assess vascular access sites hourly  3.  Every 4-6 hours minimum:  Change oxygen saturation probe site  4.  Every 4-6 hours:  If on nasal continuous positive airway pressure, respiratory therapy assess nares and determine need for appliance change or resting period  Outcome: Progressing  Flowsheets (Taken 3/20/2025 0900)  Skin Integrity Remains Intact:   Monitor for areas of redness and/or skin breakdown   Assess vascular access sites hourly     Problem: Skin/Tissue Integrity - Adult  Goal: Skin integrity remains intact  Description:  1.  Monitor for areas of redness and/or skin breakdown  2.  Assess vascular access sites hourly  3.  Every 4-6 hours minimum:  Change oxygen saturation probe site  4.  Every 4-6 hours:  If on nasal continuous positive airway pressure, respiratory therapy assess nares and determine need for appliance change or resting period  Outcome: Progressing  Flowsheets (Taken 3/20/2025 0900)  Skin Integrity Remains Intact:   Monitor for areas of redness and/or skin breakdown   Assess vascular access sites hourly     Problem: Metabolic/Fluid and Electrolytes - Adult  Goal: Electrolytes maintained within normal limits  Outcome: Progressing  Flowsheets (Taken 3/20/2025 0900)  Electrolytes maintained within normal limits:   Monitor labs and assess patient for signs and symptoms of electrolyte imbalances   Administer electrolyte replacement as ordered  Goal: Hemodynamic stability and optimal renal function maintained  Outcome: Progressing  Flowsheets (Taken 3/20/2025 0900)  Hemodynamic stability and optimal renal function maintained:   Monitor labs and assess for signs and symptoms of volume excess or deficit   Monitor intake, output and patient weight  Goal: Glucose maintained within prescribed range  Outcome: Progressing  Flowsheets (Taken 3/20/2025 0900)  Glucose maintained within prescribed range:   Monitor blood glucose as ordered   Assess for signs and symptoms of hyperglycemia and hypoglycemia     Problem: Hematologic - Adult  Goal: Maintains hematologic stability  Outcome: Progressing  Flowsheets (Taken 3/20/2025 0900)  Maintains hematologic stability:   Assess for signs and symptoms of bleeding or hemorrhage   Monitor labs for bleeding or clotting disorders     Problem: Pain  Goal: Verbalizes/displays adequate comfort level or baseline comfort level  Outcome: Progressing

## 2025-03-20 NOTE — CARE COORDINATION
Transition of Care Plan:    RUR: 20%  Prior Level of Functioning: Needs assistance from family  Disposition: CM sent consult to hospice   Follow up appointments: Per attending's recommendations  DME needed: CM following for DME needs  Transportation at discharge: Family vs BLS  IM/IMM Medicare/ letter given: 3/18/25  Is patient a [place] and connected with [facility]? No   If yes, was [place] transfer form completed and [facility] notified? N/A  Caregiver Contact: Santiago Curtis, [phone]  Discharge Caregiver contacted prior to discharge? Yes, CM will contact per patient preference  Care Conference needed? No  Barriers to discharge:  Medical stability    CM received referral for hospice. Attending believes patient will be a candidate for [facility] hospice. [facility]    ALKA CandelariaN, RN, ONC, CMSRN  Nurse Care Manager, [phone]

## 2025-03-20 NOTE — PROGRESS NOTES
[address], [facility] [place] [address]  Phone: [phone]   [phone]    [email or web address]     Nephrology Progress Note    Patient Name : Jessica Smart      : 1945     MRN : [id number]  Date of Admission : 3/17/2025  Date of Servive : 25    CC:  Follow up for VAZQUEZ       Assessment and Plan   CKD Stage IV :  progressive CKD ? ESRD  Severe metabolic acidosis    Anasarca w/ effusions, ascites  Hypoalbuminemia ? Volume overload   Hypernatremia ? Loop diuretic use   Severe anemia   Acute resp failure : on BIPAP   Hypothermia   Encephalopathy     Plan :  - she is critically ill and unlikely to survive   - she is a poor candidate for dialysis given her age and co-morbidities   - d/w son--> agreeable to DNR/DNI and not to pursue dialysis   - transition to comfort care today        Care Plan discussed with:  pt  `s son and attg      Interval History:  Seen and examined. Events noted. On jennifer hupatsy, hypothermic, BIPAP and very ill looking   Son from [place] at bedside     Review of Systems: A comprehensive review of systems was negative.    Current Medications:   Current Facility-Administered Medications   Medication Dose Route Frequency    morphine (PF) injection 2 mg  2 mg IntraVENous Q2H PRN    Or    morphine (PF) injection 4 mg  4 mg IntraVENous Q2H PRN    ondansetron (ZOFRAN) injection 4 mg  4 mg IntraVENous Q6H PRN    LORazepam (ATIVAN) injection 1 mg  1 mg IntraVENous Q6H PRN    sodium bicarbonate tablet 1,300 mg  1,300 mg Oral TID    bumetanide (BUMEX) injection 2 mg  2 mg IntraVENous q8h    ondansetron (ZOFRAN) injection 4 mg  4 mg IntraVENous Q6H PRN    epoetin vee (EPOGEN;PROCRIT) injection 20,000 Units  20,000 Units SubCUTAneous Once per day on     0.9 % sodium chloride infusion   IntraVENous PRN    0.9 % sodium chloride infusion   IntraVENous PRN    glucose chewable tablet 16 g  4 tablet Oral PRN

## 2025-03-20 NOTE — DISCHARGE SUMMARY
Death Discharge Summary     PATIENT ID: Jessica Smart  MRN: [id number]   YOB: 1945    DATE OF ADMISSION: 3/17/2025 10:02 AM    PRIMARY CARE PROVIDER: Hallie Cedeno PA-C   ATTENDING PHYSICIAN: Lu Boyle MD  CONSULTATIONS:   IP CONSULT TO INTENSIVIST  IP CONSULT TO NEPHROLOGY  IP CONSULT TO PHARMACY  IP CONSULT TO GI  IP CONSULT TO HOSPICE    PROCEDURES/SURGERIES:   * No surgery found *    REASON FOR ADMISSION: Severe sepsis ([facility])     HOSPITAL PROBLEM LIST:  Patient Active Problem List   Diagnosis    Diabetic polyneuropathy associated with type 2 diabetes mellitus (HCC)    Controlled type 2 diabetes mellitus with circulatory disorder ([facility])    Essential hypertension    History of stroke with current residual effects    Peripheral vascular disease due to secondary diabetes ([facility])    Diabetic nephropathy associated with type 2 diabetes mellitus ([facility])    Age-related osteoporosis without current pathological fracture    Unilateral AKA, left ([facility])    Ventricular hypokinesis    Nonrheumatic mitral valve regurgitation    CKD (chronic kidney disease) stage 4, GFR 15-29 ml/min ([facility])    Hyperthyroidism    S/P AKA (above knee amputation) unilateral, left ([facility])    Anemia of chronic renal failure, stage 4 (severe) ([facility])    Severe sepsis ([facility])       DATE AND TIME OF DEATH: 1416 on 3/20/25    CODE STATUS AT DISCHARGE:   Full Code    DNR    Partial   x Comfort Care     DISCHARGE DIAGNOSES:   End of Life Care   Comfort Care Measures  Severe sepsis  UTI  Acute metabolic encephalopathy  Hypothermia  Acute respiratory failure with hypoxia  Acute pulmonary edema   Acute anemia:   PAD s/p L AKA  CKD IV  Anasarca  Severe metabolic acidosis  Type 2 DM  HTN  Hyperthyroidism    Brief HPI and Hospital Course:      Jessica Smart is a 79 y.o. female who presented with hallucinations and was found to have severe sepsis with hypothermia.  Patient developed acute respiratory failure and acute pulmonary edema, as  well as severe metabolic acidosis and anasarca.  Attempts with diuresis and sodium bicarb did not improve patient's clinical status.  Given patient's overall significant decline and unlikely recovery, it was recommended that family consider comfort care/hospice.  Patient's family was in agreement with transition to comfort care/hospice once both sons could be present on 3/20.  Patient was transitioned to end-of-life comfort care on 3/20, and BiPAP was removed.  Patient ; time of death 1416 with family present at bedside.       On exam, no heart sounds or breath sounds were noted after 1 minute of auscultation.   Pupils were fixed and dilated without pupillary light reflex.     Patient was pronounced dead on 3/20/2025  at 1416.    Cause of Death:  Immediate: Acute respiratory failure 2/2 acute pulmonary edema     Events related to death:  Was code called: No   notified: No  Family notified: Yes  Autopsy requested: No  Death certificate completed: No  Code Status Prior to Death: DNR / comfort care     PHYSICAL EXAMINATION AT DISCHARGE:  GEN: No response to verbal or tactile stimuli  HEENT: Pupils fixed, dilated  CV: No cardiac activity or heart sounds appreciated. No carotid pulse.  PULM: No respiratory effort or spontaneous respirations.  Ext: No peripheral pulses.      Signed:   Lu Boyle MD  Date of Service:  3/20/2025  6:01 PM

## 2025-03-20 NOTE — PROGRESS NOTES
RT called for help with bipap. Mask not sealing well, and O2 sat in the 80's. RT came and repositioned mask. ABG obtained to verify O2 sat. Adjustments made to bipap settings. Pt now satting in the 90's.

## 2025-03-20 NOTE — PROGRESS NOTES
[facility]  Good Help to Those in Need  [phone]     Patient Name: Jessica Smart  YOB: 1945  Age: 79 y.o.    [facility] RN Note:  Hospice consult received, reviewing chart. Will follow up with Unit Nurse and Care Manager to discuss plan of care, patient status and discharge disposition within the hour.     Thank you for the opportunity to be of service to this patient.    ILEANA Kyle, RN  Clinical Nurse Liaison  [facility]  [phone]  Office: [phone]  Available on Perfect Serve

## 2025-03-21 LAB
ABO + RH BLD: NORMAL
ANTIGENS PRESENT BLD: NORMAL
ANTIGENS PRESENT RBC DONR: NORMAL
ANTIGENS PRESENT RBC DONR: NORMAL
BLD PROD TYP BPU: NORMAL
BLD PROD TYP BPU: NORMAL
BLOOD BANK BLOOD PRODUCT EXPIRATION DATE: NORMAL
BLOOD BANK CMNT PATIENT-IMP: NORMAL
BLOOD BANK DISPENSE STATUS: NORMAL
BLOOD BANK DISPENSE STATUS: NORMAL
BLOOD BANK ISBT PRODUCT BLOOD TYPE: 5100
BLOOD BANK PRODUCT CODE: NORMAL
BLOOD BANK UNIT TYPE AND RH: NORMAL
BLOOD GROUP ANTIBODIES SERPL: NORMAL
BLOOD GROUP ANTIBODIES SERPL: NORMAL
BPU ID: NORMAL
BPU ID: NORMAL
CROSSMATCH RESULT: NORMAL
CROSSMATCH RESULT: NORMAL
SPECIMEN EXP DATE BLD: NORMAL
UNIT DIVISION: 0
UNIT DIVISION: 0
UNIT ISSUE DATE/TIME: NORMAL

## 2025-03-22 LAB
BACTERIA SPEC CULT: NORMAL
SERVICE CMNT-IMP: NORMAL

## 2025-03-28 LAB
ALBUMIN SERPL ELPH-MCNC: ABNORMAL G/DL
ALBUMIN/GLOB SERPL: ABNORMAL
ALPHA1 GLOB SERPL ELPH-MCNC: ABNORMAL G/DL
ALPHA2 GLOB SERPL ELPH-MCNC: ABNORMAL G/DL
B-GLOBULIN SERPL ELPH-MCNC: ABNORMAL G/DL
GAMMA GLOB SERPL ELPH-MCNC: ABNORMAL G/DL
GLOBULIN SER-MCNC: ABNORMAL G/DL
IGA SERPL-MCNC: 222 MG/DL (ref 64–422)
IGG SERPL-MCNC: 670 MG/DL (ref 586–1602)
IGM SERPL-MCNC: 33 MG/DL (ref 26–217)
INTERPRETATION SERPL IEP-IMP: ABNORMAL
KAPPA LC FREE SER-MCNC: 138 MG/L (ref 3.3–19.4)
KAPPA LC FREE/LAMBDA FREE SER: 1.96 (ref 0.26–1.65)
LAMBDA LC FREE SERPL-MCNC: 70.5 MG/L (ref 5.7–26.3)
M PROTEIN SERPL ELPH-MCNC: ABNORMAL G/DL
PROT SERPL-MCNC: 5 G/DL (ref 6–8.5)